# Patient Record
Sex: MALE | Race: OTHER | HISPANIC OR LATINO | URBAN - METROPOLITAN AREA
[De-identification: names, ages, dates, MRNs, and addresses within clinical notes are randomized per-mention and may not be internally consistent; named-entity substitution may affect disease eponyms.]

---

## 2017-08-10 ENCOUNTER — EMERGENCY (EMERGENCY)
Facility: HOSPITAL | Age: 67
LOS: 1 days | Discharge: HOME | End: 2017-08-10
Attending: EMERGENCY MEDICINE | Admitting: EMERGENCY MEDICINE
Payer: COMMERCIAL

## 2017-08-10 VITALS
OXYGEN SATURATION: 96 % | DIASTOLIC BLOOD PRESSURE: 78 MMHG | SYSTOLIC BLOOD PRESSURE: 155 MMHG | TEMPERATURE: 98 F | HEART RATE: 106 BPM | RESPIRATION RATE: 20 BRPM

## 2017-08-10 DIAGNOSIS — S82.91XA UNSPECIFIED FRACTURE OF RIGHT LOWER LEG, INITIAL ENCOUNTER FOR CLOSED FRACTURE: Chronic | ICD-10-CM

## 2017-08-10 PROCEDURE — 99283 EMERGENCY DEPT VISIT LOW MDM: CPT

## 2017-08-10 PROCEDURE — 51702 INSERT TEMP BLADDER CATH: CPT

## 2017-08-10 PROCEDURE — 81001 URINALYSIS AUTO W/SCOPE: CPT

## 2017-08-10 PROCEDURE — 87086 URINE CULTURE/COLONY COUNT: CPT

## 2017-08-10 PROCEDURE — 99283 EMERGENCY DEPT VISIT LOW MDM: CPT | Mod: 25

## 2017-08-10 NOTE — ED PROVIDER NOTE - NS ED ROS FT
ROS: denies HA, weakness, dizziness, fevers/chills, nausea/vomiting, chest pain, SOB, diaphoresis, abdominal pain, diarrhea, joint pain, neuro deficits, rash

## 2017-08-10 NOTE — ED PROVIDER NOTE - OBJECTIVE STATEMENT
67yo M with hx BPH s/p Prostiva RF 67yo M with hx BPH s/p Prostiva RF (8/2), pineda removal today (8/10) - presents with urinary retention and suprapubic pain 4 hours post pineda removal. Has been taking flomax, drank 1.5L of water after pineda removal. Pain with urination at meatus. No fevers, chills, other illnesses, no nausea, vomiting. Very anxious and pacing around the room

## 2017-08-10 NOTE — ED ADULT NURSE REASSESSMENT NOTE - NS ED NURSE REASSESS COMMENT FT1
Indwelling urinary catheter placed using aseptic technique. Sterile equipment utilized. Second RN present to confirm sterility. Draining to gravity - initial output of 300ml. Secured w/ stat lock to upper leg. Explained procedure as it was being done - Pt tolerated procedure well. Comfort and safety provided.

## 2017-08-10 NOTE — ED PROVIDER NOTE - MEDICAL DECISION MAKING DETAILS
see attending note    *pt speaks some english, daughter easily translating when needed, decline additional services

## 2017-08-10 NOTE — ED PROVIDER NOTE - CARE PLAN
Principal Discharge DX:	Urinary retention Principal Discharge DX:	Urinary retention  Instructions for follow-up, activity and diet:	- Follow up with your urologist tomorrow  - Take keflex 500mg two times per day for ten days  - Return to the ED if you have any fever, abdominal pain, urine not draining from pineda or any other concerning symptom. Principal Discharge DX:	Urinary retention  Instructions for follow-up, activity and diet:	- Follow up with your urologist tomorrow  - Take keflex 500mg two times per day for ten days  - Return to the ED if you have any fever, abdominal pain, urine not draining from pineda or any other concerning symptom.  Secondary Diagnosis:	UTI (urinary tract infection) Principal Discharge DX:	Urinary retention  Instructions for follow-up, activity and diet:	- Follow up with your urologist tomorrow  - Continue to take cipro as prescribed  - Return to the ED if you have any fever, abdominal pain, urine not draining from pineda or any other concerning symptom.  Secondary Diagnosis:	UTI (urinary tract infection)

## 2017-08-10 NOTE — ED PROVIDER NOTE - PHYSICAL EXAMINATION
Gen: Well appearing, NAD  Head: NCAT  HEENT: PERRL, MMM, normal conjunctiva, anicteric, neck supple  Lung: CTAB, no adventitious sounds  CV: RRR, no murmurs, rubs or gallops  Abd: soft, suprapubic tenderness/fullness, no rebound or guarding, no CVAT  MSK: No edema, no visible deformities  Neuro: CN II-XII grossly intact. 5/5 strength and normal sensation in all extremities.  Skin: Warm and dry, no evidence of rash  Psych: normal mood and affect

## 2017-08-10 NOTE — ED PROVIDER NOTE - ATTENDING CONTRIBUTION TO CARE
------------ATTENDING NOTE------------   65 yo M w/ daughter c/o unable to urinate since pineda catheter removed today, describing only very small amt urine, increasing moderate suprapubic fullness and dull constant pressure, complete relief after pineda placed in ED by RN (> 250 ml initially), no fevers, no additional complaints, awaiting UA and d/c Urology for close outpt f/u,.  - Gerard Vee MD   ----------------------------------------------------------------------- ------------ATTENDING NOTE------------   67 yo M w/ daughter c/o unable to urinate since pineda catheter removed today, describing only very small amt urine, increasing moderate suprapubic fullness and dull constant pressure, complete relief after pineda placed in ED by RN (> 250 ml initially), no fevers, no additional complaints, awaiting UA and d/c Urology for close outpt f/u, UA w/ +Nit (low WBC), no fevers, will tx as UTI, in depth d/w all about ddx, tx, casandra gallagher.  - Gerard Vee MD   -----------------------------------------------------------------------

## 2017-08-10 NOTE — ED PROVIDER NOTE - PLAN OF CARE
- Follow up with your urologist tomorrow  - Take keflex 500mg two times per day for ten days  - Return to the ED if you have any fever, abdominal pain, urine not draining from pineda or any other concerning symptom. - Follow up with your urologist tomorrow  - Continue to take cipro as prescribed  - Return to the ED if you have any fever, abdominal pain, urine not draining from pineda or any other concerning symptom.

## 2017-08-10 NOTE — ED PROVIDER NOTE - PROGRESS NOTE DETAILS
Please contact Dr. Enio Wolfe [phone]; emergency [phone] - need to speak to Urology at office. [facility] PGY-2: UA significant for UTI. Pt's urologist paged without answer. Plan to d/c home with pineda and abx for UTI. Ordered ceftriaxone to be given here.

## 2017-08-11 VITALS
DIASTOLIC BLOOD PRESSURE: 79 MMHG | OXYGEN SATURATION: 96 % | TEMPERATURE: 98 F | HEART RATE: 62 BPM | SYSTOLIC BLOOD PRESSURE: 124 MMHG | RESPIRATION RATE: 18 BRPM

## 2017-08-11 LAB
APPEARANCE UR: CLEAR — SIGNIFICANT CHANGE UP
BACTERIA # UR AUTO: ABNORMAL /HPF
BILIRUB UR-MCNC: ABNORMAL
COLOR SPEC: ABNORMAL
COMMENT - URINE: SIGNIFICANT CHANGE UP
DIFF PNL FLD: ABNORMAL
GLUCOSE UR QL: NEGATIVE — SIGNIFICANT CHANGE UP
KETONES UR-MCNC: NEGATIVE — SIGNIFICANT CHANGE UP
LEUKOCYTE ESTERASE UR-ACNC: NEGATIVE — SIGNIFICANT CHANGE UP
NITRITE UR-MCNC: POSITIVE
PH UR: 6 — SIGNIFICANT CHANGE UP (ref 5–8)
PROT UR-MCNC: 100 MG/DL
RBC CASTS # UR COMP ASSIST: >50 /HPF (ref 0–2)
SP GR SPEC: 1.01 — SIGNIFICANT CHANGE UP (ref 1.01–1.02)
UROBILINOGEN FLD QL: 1
WBC UR QL: SIGNIFICANT CHANGE UP /HPF (ref 0–5)

## 2017-08-11 RX ORDER — CEPHALEXIN 500 MG
500 CAPSULE ORAL ONCE
Qty: 0 | Refills: 0 | Status: DISCONTINUED | OUTPATIENT
Start: 2017-08-11 | End: 2017-08-14

## 2017-08-11 RX ORDER — CEFTRIAXONE 500 MG/1
1 INJECTION, POWDER, FOR SOLUTION INTRAMUSCULAR; INTRAVENOUS ONCE
Qty: 0 | Refills: 0 | Status: DISCONTINUED | OUTPATIENT
Start: 2017-08-11 | End: 2017-08-11

## 2017-08-11 RX ORDER — CEPHALEXIN 500 MG
1 CAPSULE ORAL
Qty: 20 | Refills: 0 | OUTPATIENT
Start: 2017-08-11 | End: 2017-08-21

## 2017-08-11 NOTE — ED ADULT NURSE REASSESSMENT NOTE - NS ED NURSE REASSESS COMMENT FT1
Patient on PO Cipro at home for UTI, Dr. Walker aware and states OK to hold Keflex and continue with Cipro. Pt transferred to a leg bag, education provided. Pt states he feels comfortable with leg bag as he recently had one.

## 2017-08-12 LAB
CULTURE RESULTS: NO GROWTH — SIGNIFICANT CHANGE UP
SPECIMEN SOURCE: SIGNIFICANT CHANGE UP

## 2017-08-18 ENCOUNTER — EMERGENCY (EMERGENCY)
Facility: HOSPITAL | Age: 67
LOS: 1 days | Discharge: HOME | End: 2017-08-18
Attending: EMERGENCY MEDICINE | Admitting: EMERGENCY MEDICINE
Payer: COMMERCIAL

## 2017-08-18 VITALS
SYSTOLIC BLOOD PRESSURE: 121 MMHG | TEMPERATURE: 98 F | HEART RATE: 67 BPM | DIASTOLIC BLOOD PRESSURE: 78 MMHG | OXYGEN SATURATION: 98 % | RESPIRATION RATE: 19 BRPM

## 2017-08-18 VITALS
HEART RATE: 85 BPM | SYSTOLIC BLOOD PRESSURE: 117 MMHG | OXYGEN SATURATION: 95 % | RESPIRATION RATE: 16 BRPM | DIASTOLIC BLOOD PRESSURE: 76 MMHG | TEMPERATURE: 98 F

## 2017-08-18 DIAGNOSIS — S82.91XA UNSPECIFIED FRACTURE OF RIGHT LOWER LEG, INITIAL ENCOUNTER FOR CLOSED FRACTURE: Chronic | ICD-10-CM

## 2017-08-18 LAB
ANION GAP SERPL CALC-SCNC: 14 MMOL/L — SIGNIFICANT CHANGE UP (ref 5–17)
APPEARANCE UR: CLEAR — SIGNIFICANT CHANGE UP
BILIRUB UR-MCNC: NEGATIVE — SIGNIFICANT CHANGE UP
BUN SERPL-MCNC: 16 MG/DL — SIGNIFICANT CHANGE UP (ref 7–23)
CALCIUM SERPL-MCNC: 8.9 MG/DL — SIGNIFICANT CHANGE UP (ref 8.4–10.5)
CHLORIDE SERPL-SCNC: 102 MMOL/L — SIGNIFICANT CHANGE UP (ref 96–108)
CO2 SERPL-SCNC: 22 MMOL/L — SIGNIFICANT CHANGE UP (ref 22–31)
COLOR SPEC: YELLOW — SIGNIFICANT CHANGE UP
CREAT SERPL-MCNC: 0.96 MG/DL — SIGNIFICANT CHANGE UP (ref 0.5–1.3)
DIFF PNL FLD: ABNORMAL
GLUCOSE SERPL-MCNC: 117 MG/DL — HIGH (ref 70–99)
GLUCOSE UR QL: NEGATIVE — SIGNIFICANT CHANGE UP
KETONES UR-MCNC: NEGATIVE — SIGNIFICANT CHANGE UP
LEUKOCYTE ESTERASE UR-ACNC: NEGATIVE — SIGNIFICANT CHANGE UP
NITRITE UR-MCNC: NEGATIVE — SIGNIFICANT CHANGE UP
PH UR: 6 — SIGNIFICANT CHANGE UP (ref 5–8)
POTASSIUM SERPL-MCNC: 4 MMOL/L — SIGNIFICANT CHANGE UP (ref 3.5–5.3)
POTASSIUM SERPL-SCNC: 4 MMOL/L — SIGNIFICANT CHANGE UP (ref 3.5–5.3)
PROT UR-MCNC: 30 MG/DL
RBC CASTS # UR COMP ASSIST: >50 /HPF (ref 0–2)
SODIUM SERPL-SCNC: 138 MMOL/L — SIGNIFICANT CHANGE UP (ref 135–145)
SP GR SPEC: 1.01 — SIGNIFICANT CHANGE UP (ref 1.01–1.02)
UROBILINOGEN FLD QL: NEGATIVE — SIGNIFICANT CHANGE UP
WBC UR QL: SIGNIFICANT CHANGE UP /HPF (ref 0–5)

## 2017-08-18 PROCEDURE — 51702 INSERT TEMP BLADDER CATH: CPT

## 2017-08-18 PROCEDURE — 80048 BASIC METABOLIC PNL TOTAL CA: CPT

## 2017-08-18 PROCEDURE — 99283 EMERGENCY DEPT VISIT LOW MDM: CPT | Mod: 25

## 2017-08-18 PROCEDURE — 87086 URINE CULTURE/COLONY COUNT: CPT

## 2017-08-18 PROCEDURE — 81001 URINALYSIS AUTO W/SCOPE: CPT

## 2017-08-18 NOTE — ED ADULT NURSE NOTE - OBJECTIVE STATEMENT
Pt is a 67 yo male with the co urinary retention since midnight. Pt reports having prostate surgery on August 2, was d/beinto with a pineda and had the pineda removed on August 10th. Pt returned to ED on August 10th and had the pineda replaced and was removed again yesterday at 1pm. Pt was able to urinate all of yesterday up till midnight. Pt reports penile pain and the feeling of urinary urgency, but is unable to urinate. Pt denies any CP or SOB no N/V/D no cough fever or chills. Pt is currently taking flomax and finished his cipro on wednesday.

## 2017-08-18 NOTE — ED PROVIDER NOTE - CARE PLAN
Principal Discharge DX:	Urinary retention  Instructions for follow-up, activity and diet:	You were seen in the Emergency Department for urinary retention. Your examination and lab tests were reassuring. A catheter was placed. Keep the catheter in place and Please follow up with your urologist as soon as possible for further evaluation. Please return to the Emergency Department if you have any new concerning symptoms such as severe pain, weakness, shortness of breath or any other concerning symptoms.

## 2017-08-18 NOTE — ED PROVIDER NOTE - OBJECTIVE STATEMENT
65yo male p/w urinary retention for 5 hours. Pt. had prostate surgery in August 2 with Enio Diaz([phone]). Pt. had pineda removed 8/10. Pt. presented to ED on 8/10 and had to have pineda replaced. Pineda was removed yesterday after 1 week. Now patient reports penile pain and urgency, but is unable to urinate. Denies fevers, chest pain, shortness of breath, cough, n/v, weakness. Patient finished course of cipro on wed.

## 2017-08-18 NOTE — ED PROVIDER NOTE - PLAN OF CARE
You were seen in the Emergency Department for urinary retention. Your examination and lab tests were reassuring. A catheter was placed. Keep the catheter in place and Please follow up with your urologist as soon as possible for further evaluation. Please return to the Emergency Department if you have any new concerning symptoms such as severe pain, weakness, shortness of breath or any other concerning symptoms.

## 2017-08-18 NOTE — ED ADULT NURSE REASSESSMENT NOTE - NS ED NURSE REASSESS COMMENT FT1
Pt AAOx4, NAD, resting comfortably in bed with spouse at bedside. Pt denies headache, dizziness, chest pain, cough, SOB, abdominal pain, n/v/d, urinary symptoms, fevers, chills, weakness at this time. Pt discharged as per MD, miriam changed to a leg bag and is draining urine, pt ambulated independently out of ED.

## 2017-08-18 NOTE — ED PROVIDER NOTE - ATTENDING CONTRIBUTION TO CARE
Patient with history of urinary retention presenting with no urine output for 5 hours and dyscomfort.  Already on antibiotics for presumptive infection.  Has outpatient urologist.  Has required foleys in past for same.  On exam patient well appearing, vital signs within normal limits, mild suprapubic tenderness to palpation.  Will check renal function, place pineda, repeat UA/UC, dc with urologist follow up.

## 2017-08-18 NOTE — ED ADULT NURSE REASSESSMENT NOTE - NS ED NURSE REASSESS COMMENT FT1
Mckeon catheter inserted using sterile technique. Second RN present to confirm sterility. Bedside drainage to gravity. Stat lock in place. 300 cc drained

## 2017-08-19 LAB
CULTURE RESULTS: NO GROWTH — SIGNIFICANT CHANGE UP
SPECIMEN SOURCE: SIGNIFICANT CHANGE UP

## 2017-08-25 NOTE — ED PROCEDURE NOTE - PROCEDURE ADDITIONAL DETAILS
Emergency Department Focused Ultrasound performed at patient's bedside for educational purposes. The study will have a follow up study performed or was performed in the direct supervision of an ultrasound trained attending.  Bladder scan.

## 2018-01-06 ENCOUNTER — EMERGENCY (EMERGENCY)
Facility: HOSPITAL | Age: 68
LOS: 1 days | Discharge: HOME | End: 2018-01-06
Attending: EMERGENCY MEDICINE | Admitting: EMERGENCY MEDICINE
Payer: COMMERCIAL

## 2018-01-06 VITALS
DIASTOLIC BLOOD PRESSURE: 70 MMHG | RESPIRATION RATE: 16 BRPM | WEIGHT: 167.99 LBS | OXYGEN SATURATION: 96 % | TEMPERATURE: 98 F | HEART RATE: 72 BPM | SYSTOLIC BLOOD PRESSURE: 122 MMHG | HEIGHT: 65 IN

## 2018-01-06 VITALS
SYSTOLIC BLOOD PRESSURE: 108 MMHG | DIASTOLIC BLOOD PRESSURE: 64 MMHG | OXYGEN SATURATION: 95 % | TEMPERATURE: 98 F | RESPIRATION RATE: 14 BRPM | HEART RATE: 66 BPM

## 2018-01-06 DIAGNOSIS — S82.91XA UNSPECIFIED FRACTURE OF RIGHT LOWER LEG, INITIAL ENCOUNTER FOR CLOSED FRACTURE: Chronic | ICD-10-CM

## 2018-01-06 PROCEDURE — 99283 EMERGENCY DEPT VISIT LOW MDM: CPT

## 2018-01-06 RX ORDER — AZITHROMYCIN 500 MG/1
500 TABLET, FILM COATED ORAL ONCE
Qty: 0 | Refills: 0 | Status: DISCONTINUED | OUTPATIENT
Start: 2018-01-06 | End: 2018-01-10

## 2018-01-06 RX ORDER — ACETAMINOPHEN 500 MG
650 TABLET ORAL ONCE
Qty: 0 | Refills: 0 | Status: DISCONTINUED | OUTPATIENT
Start: 2018-01-06 | End: 2018-01-10

## 2018-01-06 RX ORDER — ACETAMINOPHEN 500 MG
650 TABLET ORAL ONCE
Qty: 0 | Refills: 0 | Status: COMPLETED | OUTPATIENT
Start: 2018-01-06 | End: 2018-01-06

## 2018-01-06 RX ORDER — AZITHROMYCIN 500 MG/1
500 TABLET, FILM COATED ORAL ONCE
Qty: 0 | Refills: 0 | Status: COMPLETED | OUTPATIENT
Start: 2018-01-06 | End: 2018-01-06

## 2018-01-06 RX ADMIN — Medication 650 MILLIGRAM(S): at 09:18

## 2018-01-06 RX ADMIN — AZITHROMYCIN 500 MILLIGRAM(S): 500 TABLET, FILM COATED ORAL at 09:18
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
## 2018-01-06 NOTE — ED ADULT NURSE NOTE - OBJECTIVE STATEMENT
pt c/o fever weakness general malaise feeling tired cough and fever and sore throat and nasal congestion

## 2018-01-06 NOTE — ED PROVIDER NOTE - ENMT, MLM
Airway patent, Nasal mucosa clear. Mouth with normal mucosa. Throat has no vesicles sl red, no oropharyngeal exudates and uvula is midline.

## 2018-01-06 NOTE — ED ADULT NURSE REASSESSMENT NOTE - NS ED NURSE REASSESS COMMENT FT1
pt re evaluated by md and to be d'c/d  pt discharged stable and ambulatory in nad at present d/c instruction reinforced and pt verbalized understanding vital signs as charted

## 2018-01-07 ENCOUNTER — TRANSCRIPTION ENCOUNTER (OUTPATIENT)
Age: 68
End: 2018-01-07

## 2018-02-05 ENCOUNTER — TRANSCRIPTION ENCOUNTER (OUTPATIENT)
Age: 68
End: 2018-02-05

## 2018-05-07 ENCOUNTER — TRANSCRIPTION ENCOUNTER (OUTPATIENT)
Age: 68
End: 2018-05-07

## 2018-05-07 ENCOUNTER — EMERGENCY (EMERGENCY)
Facility: HOSPITAL | Age: 68
LOS: 1 days | Discharge: HOME | End: 2018-05-07
Attending: EMERGENCY MEDICINE
Payer: COMMERCIAL

## 2018-05-07 VITALS
SYSTOLIC BLOOD PRESSURE: 114 MMHG | TEMPERATURE: 98 F | OXYGEN SATURATION: 99 % | RESPIRATION RATE: 18 BRPM | HEART RATE: 82 BPM | DIASTOLIC BLOOD PRESSURE: 77 MMHG

## 2018-05-07 DIAGNOSIS — S82.91XA UNSPECIFIED FRACTURE OF RIGHT LOWER LEG, INITIAL ENCOUNTER FOR CLOSED FRACTURE: Chronic | ICD-10-CM

## 2018-05-07 PROCEDURE — 93005 ELECTROCARDIOGRAM TRACING: CPT

## 2018-05-07 PROCEDURE — 99283 EMERGENCY DEPT VISIT LOW MDM: CPT

## 2018-05-07 PROCEDURE — 94640 AIRWAY INHALATION TREATMENT: CPT

## 2018-05-07 PROCEDURE — 71046 X-RAY EXAM CHEST 2 VIEWS: CPT

## 2018-05-07 PROCEDURE — 93010 ELECTROCARDIOGRAM REPORT: CPT | Mod: NC

## 2018-05-07 PROCEDURE — 99284 EMERGENCY DEPT VISIT MOD MDM: CPT | Mod: 25

## 2018-05-07 PROCEDURE — 71046 X-RAY EXAM CHEST 2 VIEWS: CPT | Mod: 26

## 2018-05-07 RX ORDER — ALBUTEROL 90 UG/1
1 AEROSOL, METERED ORAL ONCE
Qty: 0 | Refills: 0 | Status: COMPLETED | OUTPATIENT
Start: 2018-05-07 | End: 2018-05-07

## 2018-05-07 RX ORDER — ACETAMINOPHEN 500 MG
650 TABLET ORAL ONCE
Qty: 0 | Refills: 0 | Status: COMPLETED | OUTPATIENT
Start: 2018-05-07 | End: 2018-05-07

## 2018-05-07 RX ADMIN — ALBUTEROL 1 PUFF(S): 90 AEROSOL, METERED ORAL at 20:21

## 2018-05-07 RX ADMIN — Medication 650 MILLIGRAM(S): at 20:18

## 2018-05-07 RX ADMIN — Medication 100 MILLIGRAM(S): at 20:18

## 2018-05-07 NOTE — ED PROVIDER NOTE - PROGRESS NOTE DETAILS
Ed Marks MD (resident): patient improved w/ albuterol and meds. albuterol inhaler was given to the patient

## 2018-05-07 NOTE — ED PROVIDER NOTE - MEDICAL DECISION MAKING DETAILS
Ed Marks MD (resident): symptoms concerning for PNA vs viral respiratory/pharyngeal infection. Centor criteria of 0, no further work-up for strep. No sign of deep space neck abscess. no anginal symptoms, no BILL. SOB not likely due to cardiovascular cause based on examination. : symptoms concerning for PNA vs viral respiratory/pharyngeal infection. Centor criteria of 0, no further work-up for strep. No sign of deep space neck abscess. no anginal symptoms, no BILL ecg is normal . SOB not likely due to cardiovascular cause based on examination.ZR :  67 y old with couth ,sore throat and low great fever  ,probably common cold symptoms concerning for PNA vs viral respiratory/pharyngeal infection. Centor criteria of 0, no further work-up for strep. No sign of deep space neck abscess. no anginal symptoms, no BILL ecg is normal . SOB not likely due to cardiovascular cause based on examination. ZR

## 2018-05-07 NOTE — ED PROVIDER NOTE - PLAN OF CARE
1) Please follow-up with your Primary Medical Doctor in 3-5 days. If you need to find a new physician, please call [phone].  2) Return to the Emergency Department if you experiences: fevers, chills, chest pain, shortness of breath, or symptoms that are new or recurrent.  3) If you have any questions or concerns, do not hesitate to contact us at [phone].

## 2018-05-07 NOTE — ED PROVIDER NOTE - OBJECTIVE STATEMENT
Ed Marks MD (resident): 67 M w/ Hx of HTN, CAD s/p PCI x3 last 2 yrs ago, currently on ASA 81 mg, who p/w sore throat, cough w/ white sputum, H/A gradual onset that is mild in severity, and symptoms that started last night. Sore throat without any change in voice, drooling, neck stiffness. Pt sent from Urgent care because of SOB, however when talking to the patient, he denies having any SOB aside from when he coughs, which is infrequent. No CP, no BILL, no orthopnea, no leg swelling, no palpitations, no lightheadedness, no syncope.

## 2018-05-07 NOTE — ED PROVIDER NOTE - CARE PLAN
Principal Discharge DX:	Cough  Assessment and plan of treatment:	1) Please follow-up with your Primary Medical Doctor in 3-5 days. If you need to find a new physician, please call [phone].  2) Return to the Emergency Department if you experiences: fevers, chills, chest pain, shortness of breath, or symptoms that are new or recurrent.  3) If you have any questions or concerns, do not hesitate to contact us at [phone].  Secondary Diagnosis:	Upper respiratory infection

## 2018-05-07 NOTE — ED PROVIDER NOTE - PHYSICAL EXAMINATION
Physical Exam: elderly M who is in no respiratory distress, no increased WOB, no tachypnea, AAOx3, NCAT, MMM, neck is supple, no JVD, PERRL, CTAB, no rales or ronchi, normal rate and regular rhythm, abdomen is soft and NTND, No LE edema, No deformity of extremities, No rashes, CN grossly intact, No focal motor or sensory deficits. ~ Ed Marks MD

## 2018-05-07 NOTE — ED PROVIDER NOTE - NS ED ROS FT
No fever, no chills, no change in vision, no change in hearing, + sore throat, no chest pain, + cough, no dyspnea on exertion, no abdominal pain, no vomiting, no dysuria, no muscle pain, no rashes, no loss of consciousness. ~ Ed Marks MD

## 2018-05-07 NOTE — ED ADULT NURSE NOTE - OBJECTIVE STATEMENT
67 y.o male w. PMH of HTN, CAD came to the ER w. c/o sore throat, coughing w. white sputum, pt states it is sometimes difficult to breath with his throat. Pt denies any CP, n/v, fever, chills, weakness. Pt is axox4, lungs CTA, +bs abdomen soft non-distended, ambulating w. steady gait, safety and comfort maintained, no acute distress noted at this time.

## 2018-06-08 ENCOUNTER — NON-APPOINTMENT (OUTPATIENT)
Age: 68
End: 2018-06-08

## 2018-06-08 ENCOUNTER — APPOINTMENT (OUTPATIENT)
Dept: CARDIOLOGY | Facility: CLINIC | Age: 68
End: 2018-06-08
Payer: COMMERCIAL

## 2018-06-08 VITALS — HEART RATE: 73 BPM | OXYGEN SATURATION: 93 % | SYSTOLIC BLOOD PRESSURE: 124 MMHG | DIASTOLIC BLOOD PRESSURE: 74 MMHG

## 2018-06-08 PROCEDURE — 93000 ELECTROCARDIOGRAM COMPLETE: CPT

## 2018-06-08 PROCEDURE — 99214 OFFICE O/P EST MOD 30 MIN: CPT

## 2018-06-14 ENCOUNTER — INPATIENT (INPATIENT)
Facility: HOSPITAL | Age: 68
LOS: 0 days | Discharge: HOME | DRG: 247 | End: 2018-06-15
Attending: INTERNAL MEDICINE | Admitting: INTERNAL MEDICINE
Payer: COMMERCIAL

## 2018-06-14 ENCOUNTER — TRANSCRIPTION ENCOUNTER (OUTPATIENT)
Age: 68
End: 2018-06-14

## 2018-06-14 VITALS — HEIGHT: 65 IN | WEIGHT: 167.99 LBS

## 2018-06-14 DIAGNOSIS — I25.10 ATHEROSCLEROTIC HEART DISEASE OF NATIVE CORONARY ARTERY WITHOUT ANGINA PECTORIS: ICD-10-CM

## 2018-06-14 DIAGNOSIS — S82.91XA UNSPECIFIED FRACTURE OF RIGHT LOWER LEG, INITIAL ENCOUNTER FOR CLOSED FRACTURE: Chronic | ICD-10-CM

## 2018-06-14 LAB
ANION GAP SERPL CALC-SCNC: 15 MMOL/L — SIGNIFICANT CHANGE UP (ref 5–17)
BUN SERPL-MCNC: 19 MG/DL — SIGNIFICANT CHANGE UP (ref 7–23)
CALCIUM SERPL-MCNC: 8.6 MG/DL — SIGNIFICANT CHANGE UP (ref 8.4–10.5)
CHLORIDE SERPL-SCNC: 103 MMOL/L — SIGNIFICANT CHANGE UP (ref 96–108)
CO2 SERPL-SCNC: 22 MMOL/L — SIGNIFICANT CHANGE UP (ref 22–31)
CREAT SERPL-MCNC: 0.97 MG/DL — SIGNIFICANT CHANGE UP (ref 0.5–1.3)
GLUCOSE SERPL-MCNC: 93 MG/DL — SIGNIFICANT CHANGE UP (ref 70–99)
HCT VFR BLD CALC: 46.1 % — SIGNIFICANT CHANGE UP (ref 39–50)
HGB BLD-MCNC: 15.6 G/DL — SIGNIFICANT CHANGE UP (ref 13–17)
MCHC RBC-ENTMCNC: 30.2 PG — SIGNIFICANT CHANGE UP (ref 27–34)
MCHC RBC-ENTMCNC: 33.8 GM/DL — SIGNIFICANT CHANGE UP (ref 32–36)
MCV RBC AUTO: 89.3 FL — SIGNIFICANT CHANGE UP (ref 80–100)
PLATELET # BLD AUTO: 152 K/UL — SIGNIFICANT CHANGE UP (ref 150–400)
POTASSIUM SERPL-MCNC: 3.8 MMOL/L — SIGNIFICANT CHANGE UP (ref 3.5–5.3)
POTASSIUM SERPL-SCNC: 3.8 MMOL/L — SIGNIFICANT CHANGE UP (ref 3.5–5.3)
RBC # BLD: 5.16 M/UL — SIGNIFICANT CHANGE UP (ref 4.2–5.8)
RBC # FLD: 11.6 % — SIGNIFICANT CHANGE UP (ref 10.3–14.5)
SODIUM SERPL-SCNC: 140 MMOL/L — SIGNIFICANT CHANGE UP (ref 135–145)
WBC # BLD: 6.5 K/UL — SIGNIFICANT CHANGE UP (ref 3.8–10.5)
WBC # FLD AUTO: 6.5 K/UL — SIGNIFICANT CHANGE UP (ref 3.8–10.5)

## 2018-06-14 PROCEDURE — 92928 PRQ TCAT PLMT NTRAC ST 1 LES: CPT | Mod: LC

## 2018-06-14 PROCEDURE — 99152 MOD SED SAME PHYS/QHP 5/>YRS: CPT

## 2018-06-14 PROCEDURE — 92920 PRQ TRLUML C ANGIOP 1ART&/BR: CPT | Mod: LD

## 2018-06-14 PROCEDURE — 93458 L HRT ARTERY/VENTRICLE ANGIO: CPT | Mod: 26,59

## 2018-06-14 PROCEDURE — 93010 ELECTROCARDIOGRAM REPORT: CPT

## 2018-06-14 RX ORDER — ACETAMINOPHEN 500 MG
650 TABLET ORAL EVERY 6 HOURS
Qty: 0 | Refills: 0 | Status: DISCONTINUED | OUTPATIENT
Start: 2018-06-14 | End: 2018-06-15

## 2018-06-14 RX ORDER — ATORVASTATIN CALCIUM 80 MG/1
40 TABLET, FILM COATED ORAL AT BEDTIME
Qty: 0 | Refills: 0 | Status: DISCONTINUED | OUTPATIENT
Start: 2018-06-14 | End: 2018-06-15

## 2018-06-14 RX ORDER — METOPROLOL TARTRATE 50 MG
25 TABLET ORAL
Qty: 0 | Refills: 0 | Status: DISCONTINUED | OUTPATIENT
Start: 2018-06-14 | End: 2018-06-15

## 2018-06-14 RX ORDER — CLOPIDOGREL BISULFATE 75 MG/1
75 TABLET, FILM COATED ORAL DAILY
Qty: 0 | Refills: 0 | Status: DISCONTINUED | OUTPATIENT
Start: 2018-06-14 | End: 2018-06-15

## 2018-06-14 RX ORDER — CLOPIDOGREL BISULFATE 75 MG/1
1 TABLET, FILM COATED ORAL
Qty: 90 | Refills: 4 | OUTPATIENT
Start: 2018-06-14 | End: 2019-09-06

## 2018-06-14 RX ORDER — ASPIRIN/CALCIUM CARB/MAGNESIUM 324 MG
81 TABLET ORAL DAILY
Qty: 0 | Refills: 0 | Status: DISCONTINUED | OUTPATIENT
Start: 2018-06-14 | End: 2018-06-15

## 2018-06-14 RX ORDER — PSEUDOEPH/DM/GUAIFEN/ACETAMIN 30-10-250
0 CAPSULE ORAL
Qty: 0 | Refills: 0 | COMMUNITY

## 2018-06-14 RX ORDER — ATORVASTATIN CALCIUM 80 MG/1
1 TABLET, FILM COATED ORAL
Qty: 30 | Refills: 0 | OUTPATIENT
Start: 2018-06-14 | End: 2018-07-13

## 2018-06-14 RX ADMIN — ATORVASTATIN CALCIUM 40 MILLIGRAM(S): 80 TABLET, FILM COATED ORAL at 21:50

## 2018-06-14 RX ADMIN — Medication 25 MILLIGRAM(S): at 18:23

## 2018-06-14 RX ADMIN — Medication 650 MILLIGRAM(S): at 17:00

## 2018-06-14 RX ADMIN — Medication 650 MILLIGRAM(S): at 18:00

## 2018-06-14 NOTE — DISCHARGE NOTE ADULT - HOSPITAL COURSE
This is a 66 yo man history of CAD/MI / PCI LAD/ OM1 2015  , Eczema, BPH presents as outpatient for cardiac cath.  Pt has been experiencing chest burning with exertion and shortness of breath.   Pt had abnormal stress test on 5/29/18 revealing small area of inferior-lateral ischemia with EF 73%    S/P PCI of mid D1 (70%)-POBA and pCx (90%) x 1 YARIEL. Pt tolerated procedure well with no post cath complications and hospitalization remained uneventful. Pt stable for discharge home.     Dr. Freddy Marcial- CArdio (14 Jun 2018 12:08)

## 2018-06-14 NOTE — H&P CARDIOLOGY - HISTORY OF PRESENT ILLNESS
This is a 68 yo man history of CAD/MI / PCI LAD/ OM1 2015  , Eczema, BPH presents as outpatient for cardiac cath.  Pt has been experiencing chest burning with exertion and shortness of breath.   Pt had abnormal stress test         Dr. Freddy Marcial- CArdio This is a 68 yo man history of CAD/MI / PCI LAD/ OM1 2015  , Eczema, BPH presents as outpatient for cardiac cath.  Pt has been experiencing chest burning with exertion and shortness of breath.   Pt had abnormal stress test on 5/29/18 revealing small area of inferior-lateral ischemia with EF 73%        Dr. Freddy Marcial- CArdio

## 2018-06-14 NOTE — DISCHARGE NOTE ADULT - CARE PROVIDERS DIRECT ADDRESSES
,DirectAddress_Unknown ,DirectAddress_Unknown,[email or web address]

## 2018-06-14 NOTE — DISCHARGE NOTE ADULT - MEDICATION SUMMARY - MEDICATIONS TO TAKE
I will START or STAY ON the medications listed below when I get home from the hospital:    aspirin 81 mg oral tablet, chewable  -- 1 tab(s) by mouth once a day  -- Indication: For Heart Stent    atorvastatin 40 mg oral tablet  -- 1 tab(s) by mouth once a day (at bedtime) MDD:1  -- Indication: For Cholesterol    clopidogrel 75 mg oral tablet  -- 1 tab(s) by mouth once a day MDD:1  -- Indication: For Heart Stent    Metoprolol Tartrate 25 mg oral tablet  -- 1 tab(s) by mouth 2 times a day  -- Indication: For Heart

## 2018-06-14 NOTE — CHART NOTE - NSCHARTNOTEFT_GEN_A_CORE
Patient underwent a PCI procedure and is being admitted as they are at increased risk for major adverse cardiac and vascular events if discharged due to the following high risk characteristics:      Pre- Procedural Clinical Criteria  Unstable angina     Admit- patient underwent a PCI procedure and is being admitted due to high risk characteristicts and is considered to be at an increased risk of major adverse cardiovascular events if discharged at this time   -mDiag1 lesion x 1 YARIEL  -POBA of pCx (90%)

## 2018-06-14 NOTE — DISCHARGE NOTE ADULT - PATIENT PORTAL LINK FT
You can access the [facility] Patient Portal, offered by [facility], by registering with the following website: [email or web address]

## 2018-06-14 NOTE — DISCHARGE NOTE ADULT - CARE PROVIDER_API CALL
Freddy Marcial), Cardiovascular Disease  [address] [place] [address]  Phone: [phone]  Fax: [phone] Freddy Marcial), Cardiovascular Disease  [address] [place] [address]  Phone: [phone]  Fax: [phone]    Nayan Rosas (MD), Cardiovascular Disease; Interventional Cardiology  [address] [place] [id number]  Phone: [phone]  Fax: [phone]

## 2018-06-14 NOTE — DISCHARGE NOTE ADULT - CARE PLAN
Principal Discharge DX:	Coronary artery disease with other form of angina pectoris  Goal:	You will be free of chest pain or shortness of breath.  Assessment and plan of treatment:	Low salt, low fat diet.   Weight management.   Take medications as prescribed.    No smoking.  Follow up appointments with your doctor(s)  as instruced.

## 2018-06-15 VITALS
TEMPERATURE: 98 F | DIASTOLIC BLOOD PRESSURE: 75 MMHG | SYSTOLIC BLOOD PRESSURE: 121 MMHG | RESPIRATION RATE: 16 BRPM | OXYGEN SATURATION: 98 % | HEART RATE: 71 BPM

## 2018-06-15 DIAGNOSIS — I10 ESSENTIAL (PRIMARY) HYPERTENSION: ICD-10-CM

## 2018-06-15 DIAGNOSIS — E78.5 HYPERLIPIDEMIA, UNSPECIFIED: ICD-10-CM

## 2018-06-15 DIAGNOSIS — I25.118 ATHEROSCLEROTIC HEART DISEASE OF NATIVE CORONARY ARTERY WITH OTHER FORMS OF ANGINA PECTORIS: ICD-10-CM

## 2018-06-15 LAB
ANION GAP SERPL CALC-SCNC: 13 MMOL/L — SIGNIFICANT CHANGE UP (ref 5–17)
BASOPHILS # BLD AUTO: 0 K/UL — SIGNIFICANT CHANGE UP (ref 0–0.2)
BASOPHILS NFR BLD AUTO: 0.5 % — SIGNIFICANT CHANGE UP (ref 0–2)
BUN SERPL-MCNC: 21 MG/DL — SIGNIFICANT CHANGE UP (ref 7–23)
CALCIUM SERPL-MCNC: 8.6 MG/DL — SIGNIFICANT CHANGE UP (ref 8.4–10.5)
CHLORIDE SERPL-SCNC: 104 MMOL/L — SIGNIFICANT CHANGE UP (ref 96–108)
CO2 SERPL-SCNC: 22 MMOL/L — SIGNIFICANT CHANGE UP (ref 22–31)
CREAT SERPL-MCNC: 1.13 MG/DL — SIGNIFICANT CHANGE UP (ref 0.5–1.3)
EOSINOPHIL # BLD AUTO: 0.3 K/UL — SIGNIFICANT CHANGE UP (ref 0–0.5)
EOSINOPHIL NFR BLD AUTO: 4 % — SIGNIFICANT CHANGE UP (ref 0–6)
GLUCOSE SERPL-MCNC: 122 MG/DL — HIGH (ref 70–99)
HCT VFR BLD CALC: 44.4 % — SIGNIFICANT CHANGE UP (ref 39–50)
HGB BLD-MCNC: 15.4 G/DL — SIGNIFICANT CHANGE UP (ref 13–17)
LYMPHOCYTES # BLD AUTO: 2.1 K/UL — SIGNIFICANT CHANGE UP (ref 1–3.3)
LYMPHOCYTES # BLD AUTO: 27.6 % — SIGNIFICANT CHANGE UP (ref 13–44)
MCHC RBC-ENTMCNC: 30.9 PG — SIGNIFICANT CHANGE UP (ref 27–34)
MCHC RBC-ENTMCNC: 34.6 GM/DL — SIGNIFICANT CHANGE UP (ref 32–36)
MCV RBC AUTO: 89.4 FL — SIGNIFICANT CHANGE UP (ref 80–100)
MONOCYTES # BLD AUTO: 0.7 K/UL — SIGNIFICANT CHANGE UP (ref 0–0.9)
MONOCYTES NFR BLD AUTO: 9.2 % — SIGNIFICANT CHANGE UP (ref 2–14)
NEUTROPHILS # BLD AUTO: 4.5 K/UL — SIGNIFICANT CHANGE UP (ref 1.8–7.4)
NEUTROPHILS NFR BLD AUTO: 58.7 % — SIGNIFICANT CHANGE UP (ref 43–77)
PLATELET # BLD AUTO: 141 K/UL — LOW (ref 150–400)
POTASSIUM SERPL-MCNC: 3.7 MMOL/L — SIGNIFICANT CHANGE UP (ref 3.5–5.3)
POTASSIUM SERPL-SCNC: 3.7 MMOL/L — SIGNIFICANT CHANGE UP (ref 3.5–5.3)
RBC # BLD: 4.97 M/UL — SIGNIFICANT CHANGE UP (ref 4.2–5.8)
RBC # FLD: 11.6 % — SIGNIFICANT CHANGE UP (ref 10.3–14.5)
SODIUM SERPL-SCNC: 139 MMOL/L — SIGNIFICANT CHANGE UP (ref 135–145)
WBC # BLD: 7.7 K/UL — SIGNIFICANT CHANGE UP (ref 3.8–10.5)
WBC # FLD AUTO: 7.7 K/UL — SIGNIFICANT CHANGE UP (ref 3.8–10.5)

## 2018-06-15 PROCEDURE — C1887: CPT

## 2018-06-15 PROCEDURE — C1725: CPT

## 2018-06-15 PROCEDURE — 99153 MOD SED SAME PHYS/QHP EA: CPT

## 2018-06-15 PROCEDURE — C1874: CPT

## 2018-06-15 PROCEDURE — 85027 COMPLETE CBC AUTOMATED: CPT

## 2018-06-15 PROCEDURE — 93458 L HRT ARTERY/VENTRICLE ANGIO: CPT | Mod: 59

## 2018-06-15 PROCEDURE — C1894: CPT

## 2018-06-15 PROCEDURE — C1769: CPT

## 2018-06-15 PROCEDURE — 93005 ELECTROCARDIOGRAM TRACING: CPT

## 2018-06-15 PROCEDURE — 92920 PRQ TRLUML C ANGIOP 1ART&/BR: CPT | Mod: LD

## 2018-06-15 PROCEDURE — 99152 MOD SED SAME PHYS/QHP 5/>YRS: CPT

## 2018-06-15 PROCEDURE — 80048 BASIC METABOLIC PNL TOTAL CA: CPT

## 2018-06-15 PROCEDURE — C9600: CPT | Mod: LC

## 2018-06-15 RX ORDER — ATORVASTATIN CALCIUM 80 MG/1
1 TABLET, FILM COATED ORAL
Qty: 30 | Refills: 0
Start: 2018-06-15 | End: 2018-07-14

## 2018-06-15 RX ORDER — CLOPIDOGREL BISULFATE 75 MG/1
1 TABLET, FILM COATED ORAL
Qty: 90 | Refills: 4
Start: 2018-06-15 | End: 2019-09-07

## 2018-06-15 RX ADMIN — Medication 81 MILLIGRAM(S): at 05:11

## 2018-06-15 RX ADMIN — Medication 25 MILLIGRAM(S): at 05:12

## 2018-06-15 RX ADMIN — CLOPIDOGREL BISULFATE 75 MILLIGRAM(S): 75 TABLET, FILM COATED ORAL at 05:11

## 2018-06-15 NOTE — PROGRESS NOTE ADULT - SUBJECTIVE AND OBJECTIVE BOX
HPI:  This is a 66 yo man history of CAD/MI / PCI LAD/ OM1 2015  , Eczema, BPH presents as outpatient for cardiac cath.  Pt has been experiencing chest burning with exertion and shortness of breath.   Pt had abnormal stress test on 5/29/18 revealing small area of inferior-lateral ischemia with EF 73%    Dr. Freddy Marcial- CArdio (14 Jun 2018 12:08)    Cardiac cath 6/14/18 mid D1 70% POBA, PLCx 90% YARIEL x 1     MEDICATIONS  (STANDING):  aspirin  chewable 81 milliGRAM(s) Oral daily  atorvastatin 40 milliGRAM(s) Oral at bedtime  clopidogrel Tablet 75 milliGRAM(s) Oral daily  metoprolol tartrate 25 milliGRAM(s) Oral two times a day    MEDICATIONS  (PRN):  acetaminophen   Tablet. 650 milliGRAM(s) Oral every 6 hours PRN Moderate Pain (4 - 6)    Vital Signs Last 24 Hrs  T(C): 36.3 (15 Reginaldo 2018 04:39), Max: 36.7 (14 Jun 2018 21:20)  T(F): 97.4 (15 Reginaldo 2018 04:39), Max: 98 (14 Jun 2018 21:20)  HR: 62 (15 Reginaldo 2018 04:39) (55 - 77)  BP: 110/70 (15 Reginaldo 2018 04:39) (106/60 - 135/70)  BP(mean): --  RR: 16 (15 Reginaldo 2018 04:39) (16 - 18)  SpO2: 96% (15 Reginaldo 2018 04:39) (93% - 96%)  06-15    139  |  104  |  21  ----------------------------<  122<H>  3.7   |  22  |  1.13    Ca    8.6      15 Reginaldo 2018 00:44                          15.4   7.7   )-----------( 141      ( 15 Reginaldo 2018 00:44 )             44.4

## 2018-06-15 NOTE — PROGRESS NOTE ADULT - ASSESSMENT
Tele:  SR 60-75  EKG:  SB 52    Cardiovascular: chest pain free, SR 60-75, normal S1, S2, no murmurs, rubs, gallops  Respiratory: Lungs clear to auscultation  Gastrointestinal: +BS, soft, non-tender, non-distended	  Neurologic: AAOx3  Extremities: No LE edema  Vascular: Peripheral pulses palpable 2+ bilaterally RRA site small resolving hematoma, H/H stable no S/S infection

## 2018-06-28 ENCOUNTER — TRANSCRIPTION ENCOUNTER (OUTPATIENT)
Age: 68
End: 2018-06-28

## 2018-07-10 ENCOUNTER — EMERGENCY (EMERGENCY)
Facility: HOSPITAL | Age: 68
LOS: 1 days | Discharge: HOME | End: 2018-07-10
Attending: EMERGENCY MEDICINE
Payer: COMMERCIAL

## 2018-07-10 VITALS
DIASTOLIC BLOOD PRESSURE: 64 MMHG | OXYGEN SATURATION: 96 % | SYSTOLIC BLOOD PRESSURE: 104 MMHG | TEMPERATURE: 99 F | RESPIRATION RATE: 20 BRPM | HEIGHT: 65 IN | WEIGHT: 166.01 LBS | HEART RATE: 74 BPM

## 2018-07-10 VITALS
HEART RATE: 59 BPM | SYSTOLIC BLOOD PRESSURE: 114 MMHG | OXYGEN SATURATION: 97 % | RESPIRATION RATE: 18 BRPM | TEMPERATURE: 98 F | DIASTOLIC BLOOD PRESSURE: 73 MMHG

## 2018-07-10 DIAGNOSIS — S82.91XA UNSPECIFIED FRACTURE OF RIGHT LOWER LEG, INITIAL ENCOUNTER FOR CLOSED FRACTURE: Chronic | ICD-10-CM

## 2018-07-10 LAB
ALBUMIN SERPL ELPH-MCNC: 3.8 G/DL — SIGNIFICANT CHANGE UP (ref 3.3–5)
ALP SERPL-CCNC: 81 U/L — SIGNIFICANT CHANGE UP (ref 40–120)
ALT FLD-CCNC: 43 U/L — SIGNIFICANT CHANGE UP (ref 10–45)
ANION GAP SERPL CALC-SCNC: 13 MMOL/L — SIGNIFICANT CHANGE UP (ref 5–17)
AST SERPL-CCNC: 24 U/L — SIGNIFICANT CHANGE UP (ref 10–40)
BASOPHILS # BLD AUTO: 0.1 K/UL — SIGNIFICANT CHANGE UP (ref 0–0.2)
BASOPHILS NFR BLD AUTO: 0.8 % — SIGNIFICANT CHANGE UP (ref 0–2)
BILIRUB SERPL-MCNC: 0.5 MG/DL — SIGNIFICANT CHANGE UP (ref 0.2–1.2)
BUN SERPL-MCNC: 16 MG/DL — SIGNIFICANT CHANGE UP (ref 7–23)
CALCIUM SERPL-MCNC: 8.9 MG/DL — SIGNIFICANT CHANGE UP (ref 8.4–10.5)
CHLORIDE SERPL-SCNC: 103 MMOL/L — SIGNIFICANT CHANGE UP (ref 96–108)
CO2 SERPL-SCNC: 25 MMOL/L — SIGNIFICANT CHANGE UP (ref 22–31)
CREAT SERPL-MCNC: 1.06 MG/DL — SIGNIFICANT CHANGE UP (ref 0.5–1.3)
EOSINOPHIL # BLD AUTO: 0.4 K/UL — SIGNIFICANT CHANGE UP (ref 0–0.5)
EOSINOPHIL NFR BLD AUTO: 5.7 % — SIGNIFICANT CHANGE UP (ref 0–6)
GAS PNL BLDV: SIGNIFICANT CHANGE UP
GLUCOSE SERPL-MCNC: 102 MG/DL — HIGH (ref 70–99)
HCT VFR BLD CALC: 43.1 % — SIGNIFICANT CHANGE UP (ref 39–50)
HGB BLD-MCNC: 15.1 G/DL — SIGNIFICANT CHANGE UP (ref 13–17)
LYMPHOCYTES # BLD AUTO: 1.6 K/UL — SIGNIFICANT CHANGE UP (ref 1–3.3)
LYMPHOCYTES # BLD AUTO: 21.6 % — SIGNIFICANT CHANGE UP (ref 13–44)
MCHC RBC-ENTMCNC: 31.8 PG — SIGNIFICANT CHANGE UP (ref 27–34)
MCHC RBC-ENTMCNC: 35 GM/DL — SIGNIFICANT CHANGE UP (ref 32–36)
MCV RBC AUTO: 90.7 FL — SIGNIFICANT CHANGE UP (ref 80–100)
MONOCYTES # BLD AUTO: 0.6 K/UL — SIGNIFICANT CHANGE UP (ref 0–0.9)
MONOCYTES NFR BLD AUTO: 8.1 % — SIGNIFICANT CHANGE UP (ref 2–14)
NEUTROPHILS # BLD AUTO: 4.9 K/UL — SIGNIFICANT CHANGE UP (ref 1.8–7.4)
NEUTROPHILS NFR BLD AUTO: 63.8 % — SIGNIFICANT CHANGE UP (ref 43–77)
NT-PROBNP SERPL-SCNC: 36 PG/ML — SIGNIFICANT CHANGE UP (ref 0–300)
PLATELET # BLD AUTO: 218 K/UL — SIGNIFICANT CHANGE UP (ref 150–400)
POTASSIUM SERPL-MCNC: 3.8 MMOL/L — SIGNIFICANT CHANGE UP (ref 3.5–5.3)
POTASSIUM SERPL-SCNC: 3.8 MMOL/L — SIGNIFICANT CHANGE UP (ref 3.5–5.3)
PROT SERPL-MCNC: 7.5 G/DL — SIGNIFICANT CHANGE UP (ref 6–8.3)
RBC # BLD: 4.75 M/UL — SIGNIFICANT CHANGE UP (ref 4.2–5.8)
RBC # FLD: 11.4 % — SIGNIFICANT CHANGE UP (ref 10.3–14.5)
SODIUM SERPL-SCNC: 141 MMOL/L — SIGNIFICANT CHANGE UP (ref 135–145)
TROPONIN T, HIGH SENSITIVITY RESULT: 8 NG/L — SIGNIFICANT CHANGE UP (ref 0–51)
TROPONIN T, HIGH SENSITIVITY RESULT: <6 NG/L — SIGNIFICANT CHANGE UP (ref 0–51)
WBC # BLD: 7.6 K/UL — SIGNIFICANT CHANGE UP (ref 3.8–10.5)
WBC # FLD AUTO: 7.6 K/UL — SIGNIFICANT CHANGE UP (ref 3.8–10.5)

## 2018-07-10 PROCEDURE — 82330 ASSAY OF CALCIUM: CPT

## 2018-07-10 PROCEDURE — 80053 COMPREHEN METABOLIC PANEL: CPT

## 2018-07-10 PROCEDURE — 82435 ASSAY OF BLOOD CHLORIDE: CPT

## 2018-07-10 PROCEDURE — 85027 COMPLETE CBC AUTOMATED: CPT

## 2018-07-10 PROCEDURE — 84484 ASSAY OF TROPONIN QUANT: CPT

## 2018-07-10 PROCEDURE — 99285 EMERGENCY DEPT VISIT HI MDM: CPT | Mod: 25

## 2018-07-10 PROCEDURE — 84295 ASSAY OF SERUM SODIUM: CPT

## 2018-07-10 PROCEDURE — 82947 ASSAY GLUCOSE BLOOD QUANT: CPT

## 2018-07-10 PROCEDURE — 83605 ASSAY OF LACTIC ACID: CPT

## 2018-07-10 PROCEDURE — 71046 X-RAY EXAM CHEST 2 VIEWS: CPT

## 2018-07-10 PROCEDURE — 71046 X-RAY EXAM CHEST 2 VIEWS: CPT | Mod: 26

## 2018-07-10 PROCEDURE — 99283 EMERGENCY DEPT VISIT LOW MDM: CPT | Mod: 25

## 2018-07-10 PROCEDURE — 93010 ELECTROCARDIOGRAM REPORT: CPT | Mod: NC

## 2018-07-10 PROCEDURE — 84132 ASSAY OF SERUM POTASSIUM: CPT

## 2018-07-10 PROCEDURE — 93005 ELECTROCARDIOGRAM TRACING: CPT

## 2018-07-10 PROCEDURE — 82803 BLOOD GASES ANY COMBINATION: CPT

## 2018-07-10 PROCEDURE — 85014 HEMATOCRIT: CPT

## 2018-07-10 PROCEDURE — 83880 ASSAY OF NATRIURETIC PEPTIDE: CPT

## 2018-07-10 RX ORDER — AZITHROMYCIN 500 MG/1
500 TABLET, FILM COATED ORAL ONCE
Qty: 0 | Refills: 0 | Status: COMPLETED | OUTPATIENT
Start: 2018-07-10 | End: 2018-07-10

## 2018-07-10 RX ORDER — AZITHROMYCIN 500 MG/1
1 TABLET, FILM COATED ORAL
Qty: 4 | Refills: 0
Start: 2018-07-10 | End: 2018-07-13

## 2018-07-10 RX ADMIN — Medication 600 MILLIGRAM(S): at 10:29

## 2018-07-10 RX ADMIN — AZITHROMYCIN 500 MILLIGRAM(S): 500 TABLET, FILM COATED ORAL at 10:48

## 2018-07-10 NOTE — ED PROVIDER NOTE - PHYSICAL EXAMINATION
GENERAL: well appearing in no acute distress.   HEAD:  Atraumatic, Normocephalic  EYES: EOMI, PERRLA, conjunctiva and sclera clear  ENT: MMM; oropharynx clear  NECK: Supple, No JVD  CHEST/LUNG: Clear to auscultation bilaterally; No wheeze  HEART: Regular rate and rhythm; No murmurs, rubs, or gallops  ABDOMEN: Soft, Nontender, Nondistended; Bowel sounds present  EXTREMITIES:  2+ Peripheral Pulses, No clubbing, cyanosis, or edema  PSYCH: AAOx3  NEUROLOGY: no focal motor or sensory deficits. 5/5 muscle strength in all extremities.   SKIN: No rashes or lesions

## 2018-07-10 NOTE — ED PROVIDER NOTE - EKG #1 DATE/TIME
Subjective   HPI Comments: Mr. Tod Ambrose is a 65 y.o.male who presents to the ED with complaints of hyperglycemia. Mr. Ambrose was brought to the ED by EMS  After receiving a call by Mr. Ambrose's sister. Mr. Ambrose currently lives in his sister's basement and she had not seen him in 2 days. She went to check on his downstairs and found him unresponsive and called EMS. EMS reports a blood glucose of 22 and administered 25 grams of IV D50. Blood sugar on arrival was 74. Patient does not take insulin and the last time he states drinking was 4 weeks ago. History is very limited secondary to his AMS.     Patient is a 65 y.o. male presenting with hypoglycemia.   History provided by:  EMS personnel  History limited by:  Mental status change  Hypoglycemia   Initial blood sugar:  22  Blood sugar after intervention:  78  Severity:  Severe  Onset quality:  Sudden  Timing:  Constant  Progression:  Improving  Context: treatment noncompliance    Relieved by:  IV glucose      Review of Systems   Unable to perform ROS: Mental status change       Past Medical History:   Diagnosis Date   • Alcoholic hepatitis with ascites    • COPD (chronic obstructive pulmonary disease)    • Diastolic dysfunction    • Polycythemia    • Thrombocytopenia        No Known Allergies    Past Surgical History:   Procedure Laterality Date   • PARACENTESIS         History reviewed. No pertinent family history.    Social History     Social History   • Marital status: Single     Spouse name: N/A   • Number of children: N/A   • Years of education: N/A     Social History Main Topics   • Smoking status: Never Smoker   • Smokeless tobacco: None   • Alcohol use Yes      Comment: daily unknown amount   • Drug use: No   • Sexual activity: Not Asked     Other Topics Concern   • None     Social History Narrative         Objective   Physical Exam   Constitutional: He appears well-developed and well-nourished. He appears lethargic.   HENT:   Head: Normocephalic and  atraumatic.   Nose: Nose normal.   Eyes: Conjunctivae are normal. No scleral icterus.   Neck: Normal range of motion. Neck supple.   Cardiovascular: Normal rate, regular rhythm and normal heart sounds.    Pulmonary/Chest: Effort normal and breath sounds normal.   Patient has a heavy cough   Abdominal: Soft. There is no tenderness. A hernia is present.   Patient has a mass present on his periumbilical area c/w hernia which cannot be reduced but he states this is chronic.   Musculoskeletal: Normal range of motion. He exhibits no tenderness.   Neurological: He appears lethargic.   Skin: Skin is warm and dry.   There are areas of skin breakdown under his pannus and inguinal folds.   Nursing note and vitals reviewed.      Procedures         ED Course  ED Course   Comment By Time   Chest x-ray and ABG are negative.  Urinalysis is consistent with urinary tract infection. Ramiro Ruiz MD 10/08 1136   Lactic acid is very severely elevated.  I have ordered additional IV fluids.  I am awaiting remainder of his lab work.  We are trying to obtain a CAT scan of his head although the CT personnel of advise me that he is not able to hold still.  They have requested sedation although given his altered mental status to begin with I cannot do that Ramiro Ruiz MD 10/08 1231   Will obtain ct a/p to eval for intra abd source of sepsis and lactic acidosis, poss from prolonged hypoglycemis ? Ramiro Ruiz MD 10/08 1306   I had a long talk with his sister.  I discussed the grim prognosis associated with a lactic acidosis this severe.  He is becoming more restless.  The CT tech is advised that they cannot obtain a CAT scan of his belly unless we were able to sedate him.  We'll proceed with Ativan.  I have paged the ICU physician on-call as well.  His sister confirms that the periumbilical hernia is chronic and unchanged Ramiro Ruiz MD 10/08 1321   Dr. Ruiz discussed case with Dr. Castro, intensivist, who will admit the patient  to the ICU. Fay Dailey 10/08 1333                     [facility]    Final diagnoses:   Sepsis, due to unspecified organism   Lactic acidosis       Documentation assistance provided by hector Boogie.  Information recorded by the scribe was done at my direction and has been verified and validated by me.     Shola Boogie  10/08/17 1110       Fay Dailey  10/08/17 1400       Ramiro Ruiz MD  10/12/17 0652     10-Jul-2018 10:13

## 2018-07-10 NOTE — ED PROVIDER NOTE - PLAN OF CARE
1. Take Azithromycin as prescribed for four more days.  2. Take Mucinex as directed for cough and mucus production  3. Follow up with your primary care doctor. Return to the emergency department for worsening chest pain or difficulty breathing.

## 2018-07-10 NOTE — ED ADULT NURSE NOTE - OBJECTIVE STATEMENT
0845 67 yr old HM c/o sternal, back and throat pain  x 2 wks associated with cough. eval at [facility] 6/28/2018 and was started on Benzonotate. s/p cardiac stents 6/14/2018. A&Ox4. ambulatory. color pink. skin W&D. Denies fever or chills

## 2018-07-10 NOTE — ED PROVIDER NOTE - PMH
BPH (benign prostatic hyperplasia)    Diverticulosis    Eczema    ELLIOTT on CPAP BPH (benign prostatic hyperplasia)    CAD (coronary artery disease)    Diverticulosis    Eczema    ELLIOTT on CPAP    Stented coronary artery

## 2018-07-10 NOTE — ED PROVIDER NOTE - CARE PLAN
Assessment and plan of treatment:	1. Take Azithromycin as prescribed for four more days.  2. Take Mucinex as directed for cough and mucus production  3. Follow up with your primary care doctor. Return to the emergency department for worsening chest pain or difficulty breathing. Principal Discharge DX:	Cough  Assessment and plan of treatment:	1. Take Azithromycin as prescribed for four more days.  2. Take Mucinex as directed for cough and mucus production  3. Follow up with your primary care doctor. Return to the emergency department for worsening chest pain or difficulty breathing.

## 2018-07-10 NOTE — ED PROVIDER NOTE - OBJECTIVE STATEMENT
68 y/o M hx of CAD s/p PCI to LCx ('18) & mLAD (remote), ELLIOTT on CPAP, BPH presents with shortness of breath. 68 y/o M hx of CAD s/p PCI to LCx ('18) & mLAD/OM1 ('15), ELLIOTT on CPAP, BPH presents with shortness of breath. 66 y/o M hx of CAD s/p PCI to LCx ('18) & mLAD/OM1 ('15), ELLIOTT on CPAP, BPH presents with productive cough & fevers x 3 weeks.    Patient notes baseline exertional dyspnea for which he initially underwent cath but has noted pleuritic chest pain and productive cough. Patient states that mucous has been getting darker and feels subjective fevers. Notes chest discomfort with deep breath and coughing but none at rest or with ambulation. Patient notes increased sputum production so much that it has been difficult to sleep at night but otherwise no change exercise tolerance. Patient given tessalon pearls with little relief.

## 2018-07-10 NOTE — ED PROVIDER NOTE - MEDICAL DECISION MAKING DETAILS
68 y/o M hx of CAD s/p PCI to LCx ('18) & mLAD/OM1 ('15), ELLIOTT on CPAP, BPH presents with productive cough & fevers x 3 weeks. Likely bronchitis vs. PNA; however given cardiac hx would need to r/o ACS although low suspicion from H&P. Plan: EKG, CXR, mucolytics, CE, reassess. 66 y/o M hx of CAD s/p PCI to LCx ('18) & mLAD/OM1 ('15), ELLIOTT on CPAP, BPH presents with productive cough & fevers x 3 weeks. Likely bronchitis vs. PNA; however given cardiac hx would need to r/o ACS although low suspicion from H&P. Plan: EKG, CXR, mucolytics, CE, reassess.    SHELLEY Sanabria MD: 66 y/o M hx of CAD s/p PCI to LCx ('18) & mLAD/OM1 ('15), ELLIOTT on CPAP, BPH presents with productive cough & fevers x 3 weeks.    SHELLEY Sanabria MD: Patient notes baseline exertional dyspnea for which he initially underwent cath but has noted pleuritic chest pain and productive cough. Patient states that mucous has been getting darker and feels subjective fevers. Notes chest discomfort with deep breath and coughing but none at rest or with ambulation. Patient notes increased sputum production so much that it has been difficult to sleep at night but otherwise no change exercise tolerance. Patient given tessalon pearls with little relief. 66 y/o M hx of CAD s/p PCI to LCx ('18) & mLAD/OM1 ('15), ELLIOTT on CPAP, BPH presents with productive cough & fevers x 3 weeks. Likely bronchitis vs. PNA; however given cardiac hx would need to r/o ACS although low suspicion from H&P. Plan: EKG, CXR, mucolytics, CE, reassess.    SHELLEY Sanabria MD: 66 y/o M hx of CAD s/p PCI to LCx ('18) & mLAD/OM1 ('15), ELLIOTT on CPAP, BPH presents with productive cough & fevers x 3 weeks.  Patient notes baseline exertional dyspnea for which he initially underwent cath. x 3 weeks has had productive cough with white sputum and pain in chest only when he coughs. but has noted pleuritic chest pain and productive cough. On my hx, pt denied pleuritic CP. Patient notes increased sputum production so much that it has been difficult to sleep at night but otherwise no change exercise tolerance. Patient given tessalon pearls with little relief. Denies trauma, travel, immobilization, leg pain/swelling.  DDx: PNA, bronchitis, viral syndrome, CHF. Less likely ACS or PE given atypical presentation with cough and mucus production, CP / SOB not major complaint at this time. Plan: basic labs, trop, CXR, ekg, re-assess

## 2018-07-27 PROBLEM — Z95.5 PRESENCE OF CORONARY ANGIOPLASTY IMPLANT AND GRAFT: Chronic | Status: ACTIVE | Noted: 2018-07-10

## 2018-08-09 ENCOUNTER — NON-APPOINTMENT (OUTPATIENT)
Age: 68
End: 2018-08-09

## 2018-08-09 ENCOUNTER — APPOINTMENT (OUTPATIENT)
Dept: CARDIOLOGY | Facility: CLINIC | Age: 68
End: 2018-08-09
Payer: COMMERCIAL

## 2018-08-09 VITALS
HEART RATE: 67 BPM | HEIGHT: 65 IN | OXYGEN SATURATION: 94 % | WEIGHT: 168 LBS | SYSTOLIC BLOOD PRESSURE: 119 MMHG | BODY MASS INDEX: 27.99 KG/M2 | DIASTOLIC BLOOD PRESSURE: 68 MMHG

## 2018-08-09 PROCEDURE — 93000 ELECTROCARDIOGRAM COMPLETE: CPT

## 2018-08-09 PROCEDURE — 99214 OFFICE O/P EST MOD 30 MIN: CPT

## 2018-11-27 NOTE — ED ADULT NURSE NOTE - CAS TRG GENERAL AIRWAY, MLM
CC:  Ladonna Scott is here today for follow-up right shoulder pain, requesting cortisone injection.    Referring MD   PCP Luigi Hernandez MD   Medications: medications verified, no change  Refills needed today?  NO  denies known Latex allergy or symptoms of Latex sensitivity.  Patient would like communication of their results via:      Cell Phone:   Telephone Information:   Mobile [phone]     Okay to leave a message containing results? Yes  Tobacco history: verified               
CHIEF COMPLAINT:    Chief Complaint   Patient presents with   • Shoulder     follow-up right shoulder pain, requesting cortisone injection        PROCEDURE NOTE:  The risks and benefits of the injection were discussed. Risks include but are not limited to fever, bleeding, pain, failure to resolve pain or worsening pain, and blood sugar elevation. Patient understands these risks and wishes to proceed with the treatment. There were no assurances or guarantees given as to the results of the injection.    Description of Procedure:  The right shoulder was sterilely prepped, 4 mL of 1% Lidocaine and 80 mg Depo Medrol was injected without complications.  The patient tolerated the procedure well.  A Band-Aid was applied.  Post injection instructions were given and questions were answered.      On 11/27/2018, Tonia ZUNIGA scribed the services personally performed by Dr. Juanito Pitts MD    The documentation recorded by the scribe accurately and completely reflects the service(s) I personally performed and the decisions made by me.       
Patent

## 2019-02-13 ENCOUNTER — APPOINTMENT (OUTPATIENT)
Dept: CARDIOLOGY | Facility: CLINIC | Age: 69
End: 2019-02-13
Payer: COMMERCIAL

## 2019-02-13 ENCOUNTER — NON-APPOINTMENT (OUTPATIENT)
Age: 69
End: 2019-02-13

## 2019-02-13 VITALS
OXYGEN SATURATION: 94 % | SYSTOLIC BLOOD PRESSURE: 116 MMHG | HEIGHT: 65 IN | DIASTOLIC BLOOD PRESSURE: 69 MMHG | HEART RATE: 69 BPM | WEIGHT: 167 LBS | BODY MASS INDEX: 27.82 KG/M2

## 2019-02-13 PROCEDURE — 93000 ELECTROCARDIOGRAM COMPLETE: CPT

## 2019-02-13 PROCEDURE — 99214 OFFICE O/P EST MOD 30 MIN: CPT

## 2019-02-13 NOTE — REASON FOR VISIT
[Follow-Up - Clinic] : a clinic follow-up of [Abnormal Test Result] : an abnormal test result [Coronary Artery Disease] : coronary artery disease [Hyperlipidemia] : hyperlipidemia [Hypertension] : hypertension [Medication Management] : Medication management

## 2019-02-14 NOTE — PATIENT PROFILE ADULT. - TEACHING/LEARNING FACTORS INFLUENCE READINESS TO LEARN
Telephone Encounter by Daniel Carrizales MD at 11/19/18 04:27 PM     Author:  Daniel Carrizales MD Service:  (none) Author Type:  Physician     Filed:  11/19/18 04:28 PM Encounter Date:  11/19/2018 Status:  Signed     :  Daniel Carrizales MD (Physician)            Thanks for the note. I have requested my staff to arrange for inpatient stay for 48 hrs at [facility] or [facility].    TPN will be started in house and discharged with prescription for [address] [place] [address] to take over from there.[RP1.1M]     Electronically Signed by:    Daniel Carrizales MD , 11/19/2018[RP1.2T]          Revision History        User Key Date/Time User Provider Type Action    > RP1.2 11/19/18 04:28 PM Daniel Carrizales MD Physician Sign     RP1.1 11/19/18 04:27 PM Daniel Carrizales MD Physician     M - Manual, T - Template anxiety/pain

## 2019-03-24 NOTE — DISCUSSION/SUMMARY
[Patient] : the patient [Risks] : risks [Benefits] : benefits [Alternatives] : alternatives [___ Month(s)] : [unfilled] month(s) [With Me] : with me [FreeTextEntry1] : 67M with prior NSTEMI s/p PCI with YARIEL to pLAD and OM1. Preserved LV function.\par With prior abn stress test -s/p Lcx PCI with YARIEL and POBA to small D1  now doing well\par No changes to meds\par Recheck ischemic eval\par

## 2019-03-24 NOTE — REVIEW OF SYSTEMS
[Feeling Fatigued] : feeling fatigued [see HPI] : see HPI [Negative] : Neurological [Dyspnea on exertion] : not dyspnea during exertion [Fever] : no fever [Chills] : no chills [Feeling Poorly] : not feeling poorly [Feeling Tired] : not feeling tired [Recent Weight Gain (___ Lbs)] : no recent weight gain [Recent Weight Loss (___ Lbs)] : no recent weight loss [Eye Pain] : no eye pain [Red Eyes] : eyes not red [Eyesight Problems] : no eyesight problems [Discharge From Eyes] : no purulent discharge from the eyes [Dry Eyes] : no dryness of the eyes [Eyes Itch] : no itching of the eyes [Earache] : no earache [Loss Of Hearing] : no hearing loss [Nosebleeds] : no nosebleeds [Nasal Discharge] : no nasal discharge [Sore Throat] : no sore throat [Hoarseness] : no hoarseness [Heart Rate Is Slow] : the heart rate was not slow [Heart Rate Is Fast] : the heart rate was not fast [Chest Pain] : no chest pain [Palpitations] : no palpitations [Leg Claudication] : no intermittent leg claudication [Lower Ext Edema] : no extremity edema [Shortness Of Breath] : no shortness of breath [Cough] : no cough [Orthopnea] : no orthopnea [Wheezing] : no wheezing [SOB on Exertion] : no shortness of breath during exertion [PND] : no PND [Abdominal Pain] : no abdominal pain [Vomiting] : no vomiting [Constipation] : no constipation [Diarrhea] : no diarrhea [Heartburn] : no heartburn [Melena] : no melena [Dysuria] : no dysuria [Incontinence] : no incontinence [Hesitancy] : no urinary hesitancy [Nocturia] : no nocturia [Genital Lesion] : no genital lesions [Testicular Pain] : no testicular pain [Arthralgias] : no arthralgias [Joint Pain] : no joint pain [Joint Swelling] : no joint swelling [Joint Stiffness] : no joint stiffness [Limb Pain] : no limb pain [Limb Swelling] : no limb swelling [Skin Lesions] : no skin lesions [Skin Wound] : no skin wound [Itching] : no itching [Change In A Mole] : no change in a mole [Dry Skin] : no dry skin [An Unusual Growth] : no unusual growth on the skin [Confused] : no confusion [Convulsions] : no convulsions [Dizziness] : no dizziness [Fainting] : no fainting [Limb Weakness] : no limb weakness [Difficulty Walking] : no difficulty walking [Suicidal] : not suicidal [Sleep Disturbances] : no sleep disturbances [Anxiety] : no anxiety [Depression] : no depression [Change In Personality] : no personality change [Emotional Problems] : no emotional problems [Proptosis] : no proptosis [Hot Flashes] : no hot flashes [Muscle Weakness] : no muscle weakness [Erectile Dysfunction] : no erectile dysfunction [Deepening Of The Voice] : no deepening of the voice [Feelings Of Weakness] : no feelings of weakness [Easy Bleeding] : no tendency for easy bleeding [Easy Bruising] : no tendency for easy bruising [Swollen Glands] : no swollen glands [Swollen Glands In The Neck] : no swollen glands in the neck

## 2019-03-24 NOTE — HISTORY OF PRESENT ILLNESS
[FreeTextEntry1] : Ernesto is returning for a f/u\par \par Feels well\par No CP\par No syncope or palpitations\par No falls or blackouts\par Mild BILL\par No LE edema\par \par

## 2019-03-24 NOTE — PHYSICAL EXAM
[Normal Appearance] : normal appearance [General Appearance - In No Acute Distress] : no acute distress [Normal Conjunctiva] : the conjunctiva exhibited no abnormalities [Normal Oral Mucosa] : normal oral mucosa [Normal Jugular Venous A Waves Present] : normal jugular venous A waves present [Normal Jugular Venous V Waves Present] : normal jugular venous V waves present [No Jugular Venous Rojas A Waves] : no jugular venous rojas A waves [Respiration, Rhythm And Depth] : normal respiratory rhythm and effort [Exaggerated Use Of Accessory Muscles For Inspiration] : no accessory muscle use [Auscultation Breath Sounds / Voice Sounds] : lungs were clear to auscultation bilaterally [Heart Rate And Rhythm] : heart rate and rhythm were normal [Heart Sounds] : normal S1 and S2 [Murmurs] : no murmurs present [Edema] : no peripheral edema present [Abdomen Soft] : soft [Abnormal Walk] : normal gait [Nail Clubbing] : no clubbing of the fingernails [Cyanosis, Localized] : no localized cyanosis [Petechial Hemorrhages (___cm)] : no petechial hemorrhages [] : no ischemic changes [Skin Color & Pigmentation] : normal skin color and pigmentation [Oriented To Time, Place, And Person] : oriented to person, place, and time [Affect] : the affect was normal [General Appearance - Well Developed] : well developed [General Appearance - Well Nourished] : well nourished

## 2019-08-01 NOTE — ED ADULT TRIAGE NOTE - TEMPERATURE IN FAHRENHEIT (DEGREES F)
Called patient to let him know letter was ready to . No answer no voicemail. Mailed letter to patient.   98.9

## 2019-12-09 ENCOUNTER — OTHER (OUTPATIENT)
Age: 69
End: 2019-12-09

## 2020-02-06 ENCOUNTER — NON-APPOINTMENT (OUTPATIENT)
Age: 70
End: 2020-02-06

## 2020-02-06 ENCOUNTER — APPOINTMENT (OUTPATIENT)
Dept: CARDIOLOGY | Facility: CLINIC | Age: 70
End: 2020-02-06
Payer: COMMERCIAL

## 2020-02-06 VITALS
SYSTOLIC BLOOD PRESSURE: 115 MMHG | HEIGHT: 65 IN | WEIGHT: 170 LBS | HEART RATE: 76 BPM | DIASTOLIC BLOOD PRESSURE: 70 MMHG | BODY MASS INDEX: 28.32 KG/M2 | OXYGEN SATURATION: 94 %

## 2020-02-06 PROCEDURE — 93000 ELECTROCARDIOGRAM COMPLETE: CPT

## 2020-02-06 PROCEDURE — 99213 OFFICE O/P EST LOW 20 MIN: CPT

## 2020-02-06 NOTE — PHYSICAL EXAM
[General Appearance - Well Developed] : well developed [General Appearance - Well Nourished] : well nourished [Normal Appearance] : normal appearance [Normal Oral Mucosa] : normal oral mucosa [Normal Conjunctiva] : the conjunctiva exhibited no abnormalities [General Appearance - In No Acute Distress] : no acute distress [Normal Jugular Venous A Waves Present] : normal jugular venous A waves present [No Jugular Venous Rojas A Waves] : no jugular venous rojas A waves [Normal Jugular Venous V Waves Present] : normal jugular venous V waves present [Respiration, Rhythm And Depth] : normal respiratory rhythm and effort [Exaggerated Use Of Accessory Muscles For Inspiration] : no accessory muscle use [Heart Rate And Rhythm] : heart rate and rhythm were normal [Heart Sounds] : normal S1 and S2 [Auscultation Breath Sounds / Voice Sounds] : lungs were clear to auscultation bilaterally [Edema] : no peripheral edema present [Murmurs] : no murmurs present [Nail Clubbing] : no clubbing of the fingernails [Abnormal Walk] : normal gait [Abdomen Soft] : soft [Petechial Hemorrhages (___cm)] : no petechial hemorrhages [] : no ischemic changes [Cyanosis, Localized] : no localized cyanosis [Oriented To Time, Place, And Person] : oriented to person, place, and time [Skin Color & Pigmentation] : normal skin color and pigmentation [Affect] : the affect was normal

## 2020-02-06 NOTE — REASON FOR VISIT
[Follow-Up - Clinic] : a clinic follow-up of [Abnormal Test Result] : an abnormal test result [Coronary Artery Disease] : coronary artery disease [Hyperlipidemia] : hyperlipidemia [Medication Management] : Medication management [Hypertension] : hypertension

## 2020-02-07 NOTE — REVIEW OF SYSTEMS
[Feeling Fatigued] : feeling fatigued [see HPI] : see HPI [Negative] : Respiratory [Dyspnea on exertion] : dyspnea during exertion [Fever] : no fever [Chills] : no chills [Feeling Poorly] : not feeling poorly [Recent Weight Gain (___ Lbs)] : no recent weight gain [Feeling Tired] : not feeling tired [Eye Pain] : no eye pain [Recent Weight Loss (___ Lbs)] : no recent weight loss [Eyesight Problems] : no eyesight problems [Red Eyes] : eyes not red [Discharge From Eyes] : no purulent discharge from the eyes [Eyes Itch] : no itching of the eyes [Earache] : no earache [Dry Eyes] : no dryness of the eyes [Loss Of Hearing] : no hearing loss [Nasal Discharge] : no nasal discharge [Nosebleeds] : no nosebleeds [Sore Throat] : no sore throat [Heart Rate Is Slow] : the heart rate was not slow [Hoarseness] : no hoarseness [Chest Pain] : no chest pain [Heart Rate Is Fast] : the heart rate was not fast [Leg Claudication] : no intermittent leg claudication [Palpitations] : no palpitations [Lower Ext Edema] : no extremity edema [Shortness Of Breath] : no shortness of breath [Cough] : no cough [Orthopnea] : no orthopnea [Wheezing] : no wheezing [SOB on Exertion] : no shortness of breath during exertion [PND] : no PND [Vomiting] : no vomiting [Abdominal Pain] : no abdominal pain [Constipation] : no constipation [Melena] : no melena [Diarrhea] : no diarrhea [Heartburn] : no heartburn [Dysuria] : no dysuria [Incontinence] : no incontinence [Genital Lesion] : no genital lesions [Hesitancy] : no urinary hesitancy [Nocturia] : no nocturia [Arthralgias] : no arthralgias [Testicular Pain] : no testicular pain [Joint Stiffness] : no joint stiffness [Joint Swelling] : no joint swelling [Joint Pain] : no joint pain [Limb Pain] : no limb pain [Limb Swelling] : no limb swelling [Skin Lesions] : no skin lesions [Itching] : no itching [Skin Wound] : no skin wound [An Unusual Growth] : no unusual growth on the skin [Dry Skin] : no dry skin [Change In A Mole] : no change in a mole [Confused] : no confusion [Convulsions] : no convulsions [Dizziness] : no dizziness [Limb Weakness] : no limb weakness [Difficulty Walking] : no difficulty walking [Fainting] : no fainting [Suicidal] : not suicidal [Sleep Disturbances] : no sleep disturbances [Anxiety] : no anxiety [Change In Personality] : no personality change [Emotional Problems] : no emotional problems [Depression] : no depression [Proptosis] : no proptosis [Hot Flashes] : no hot flashes [Erectile Dysfunction] : no erectile dysfunction [Muscle Weakness] : no muscle weakness [Deepening Of The Voice] : no deepening of the voice [Easy Bleeding] : no tendency for easy bleeding [Feelings Of Weakness] : no feelings of weakness [Easy Bruising] : no tendency for easy bruising [Swollen Glands In The Neck] : no swollen glands in the neck [Swollen Glands] : no swollen glands

## 2020-02-07 NOTE — DISCUSSION/SUMMARY
[Patient] : the patient [Benefits] : benefits [Risks] : risks [Alternatives] : alternatives [___ Month(s)] : [unfilled] month(s) [With Me] : with me [FreeTextEntry1] : 69M with prior NSTEMI s/p PCI with YARIEL to pLAD and OM1. Preserved LV function.\par With prior abn stress test -s/p Lcx PCI with YARIEL and POBA to small D1 \par \par Given new sypmtoms I asked him to recheck ischemic eval\par

## 2020-02-26 ENCOUNTER — APPOINTMENT (OUTPATIENT)
Dept: VASCULAR SURGERY | Facility: CLINIC | Age: 70
End: 2020-02-26
Payer: COMMERCIAL

## 2020-02-26 VITALS
BODY MASS INDEX: 27.49 KG/M2 | DIASTOLIC BLOOD PRESSURE: 62 MMHG | HEART RATE: 80 BPM | TEMPERATURE: 97.7 F | WEIGHT: 165 LBS | HEIGHT: 65 IN | SYSTOLIC BLOOD PRESSURE: 101 MMHG

## 2020-02-26 DIAGNOSIS — G47.62 SLEEP RELATED LEG CRAMPS: ICD-10-CM

## 2020-02-26 DIAGNOSIS — I83.899 VARICOSE VEINS OF UNSPECIFIED LOWER EXTREMITY WITH OTHER COMPLICATIONS: ICD-10-CM

## 2020-02-26 DIAGNOSIS — R25.2 CRAMP AND SPASM: ICD-10-CM

## 2020-02-26 PROCEDURE — 99204 OFFICE O/P NEW MOD 45 MIN: CPT

## 2020-02-26 PROCEDURE — 93970 EXTREMITY STUDY: CPT

## 2020-02-26 PROCEDURE — 93923 UPR/LXTR ART STDY 3+ LVLS: CPT

## 2020-02-26 NOTE — HISTORY OF PRESENT ILLNESS
[FreeTextEntry1] : Pt c/o  bilateral leg pain swelling heaviness and discomfort worse w activity and by the end of the day\par  1 year ago \par Intensity moderate \par Pt denies recent injury, travel or thrombophilic event\par \par Pt c/o  bilateral  leg  intermittent claudication at  blocks and nightly , occasional griselda leg and foot nocturnal cramps 1x/week \par pt states that it is affecting his sleep\par Onset 10 mo ago \par Intensity mild \par \par Pt denies any recent  cardiac c/o , SOB, Chest Pain or recent heart attacks \par pt has coronary stents \par pt is employed daily from 11 pm to 6am\par pt states  le  nocturnal leg cramps are the worst \par \par \par \par

## 2020-02-26 NOTE — ASSESSMENT
[FreeTextEntry1] : Impression symptomatic arterial insuff , symptomatic venous insuff \par \par \par Plan Med Conservative management leg elevation, knee high compression stockings 15-20mm Hg (rx given), wt loss, diet control, exercise program, protective measures\par trial pletal  50 bid \par Indications risks and benefits of  left  LSV  RFA were explained to pt who understands\par pt wants to proceed \par ov w carotid duplex s/o stenosis and re eval art and venous insuff sx  1mo \par ov w clarence/pvr s/o art insuff 12mo feb 2021 \par \par \par Varicose veins are enlarged, twisted veins. Varicose veins are caused by increased blood pressure in the veins.  The blood moves towards the heart by 1-way valves in the veins. When the valves become weakened or damaged, blood can collect in the veins and pool in your lower legs (ankles). This causes the veins to become enlarged and incompetent with reflux. Sitting or standing for long periods can cause blood to pool in the leg veins, increasing the pressure within the veins. \par Risk factors for varicose veins or venous disease may include:  obesity, older age, standing or sitting for prolonged periods of time for several years, being female, pregnancy, taking oral contraceptive pills or hormone replacement, being inactive, and/or smoking. \par The most common symptoms of varicose veins are sensations in the legs, such as a heavy feeling, burning, and/or aching. However, each individual may experience symptoms differently.  Other symptoms may include:  color changes in the skin, sores on the legs, or rash.  Severe varicose veins or venous disease may eventually produce long-term mild swelling that can result in more serious skin and tissue problems, such as ulcers and non-healing sores.\par Varicose veins and venous disease are diagnosed by a complete medical history, physical examination, and diagnostic studies for varicose veins including duplex ultrasound and color-flow imaging.  \par Medical treatment for varicose veins and venous disease include:  compression stockings, sclerotherapy, endovenous ablation and/or surgical treatment with microphlebectomy.  \par \par  [Arterial/Venous Disease] : arterial/venous disease [Medication Management] : medication management [Other: _____] : [unfilled] [Foot care/Footwear] : foot care/footwear

## 2020-02-26 NOTE — PHYSICAL EXAM
[Normal Breath Sounds] : Normal breath sounds [Ankle Swelling (On Exam)] : present [Ankle Swelling Bilaterally] : bilaterally  [Varicose Veins Of Lower Extremities] : bilaterally [Ankle Swelling On The Right] : mild [] : present [2+] : right 2+ [Left Carotid Bruit] : left carotid bruit heard [Right Carotid Bruit] : right carotid bruit heard [1+] : left 1+ [Ankle Swelling On The Left] : moderate [No HSM] : no hepatosplenomegaly [No Rash or Lesion] : No rash or lesion [Alert] : alert [Oriented to Person] : oriented to person [Oriented to Place] : oriented to place [Oriented to Time] : oriented to time [Calm] : calm [JVD] : no jugular venous distention  [Abdomen Masses] : No abdominal masses [Tender] : was nontender [Stool Sample Taken] : No stool obtained  on rectal exam [de-identified] : wnl  [de-identified] : nad  [FreeTextEntry1] : Mild arterial insufficiency w mild to  moderate trophic skin changes \par no wounds/ulcers\par Mild moderate severe bilateral right left leg venous insufficiency \par w mild to moderate bilateral leg stasis dermatitis, hyperpigmentation in the gator area, hyperkeratosis (skin thickening)\par and moderate bilateral leg edema \par Multiple  bilateral  leg small and medium l varicose  veins and spider veins  ant medial thigh and calf and shin \par RLE Varicose veins measuring  3-4 mm in size on the thigh/calf /shin \par LLE Varicose veins measuring  3-4 mm in size on the thigh/calf /shin \par no wounds/ulcers\par  [de-identified] : wnl [de-identified] : wnl [de-identified] : cooperative [de-identified] : Leo Cranial nerves 2-12 leo grossly intact

## 2020-02-26 NOTE — DATA REVIEWED
[FreeTextEntry1] : 2/26/2020 LEESA/PVR RLE \par                                  Leo Lower Extremities with no significant arterial occlusive disease  \par                                   and w vessel calcification\par                                  Rt LEESA  1.20 Lt LEESA  1.24\par                                 \par \par 2/26/2020 Venous Doppler Leo LE  no acute dvt svt \par                            RLE sig for thigh and calf insuff collaterals\par                            LLE  LSV insuff from spj to distal calf level w insuff knee and calf  collaterals\par \par

## 2020-03-04 ENCOUNTER — OUTPATIENT (OUTPATIENT)
Dept: OUTPATIENT SERVICES | Facility: HOSPITAL | Age: 70
LOS: 1 days | End: 2020-03-04
Payer: COMMERCIAL

## 2020-03-04 ENCOUNTER — APPOINTMENT (OUTPATIENT)
Dept: CV DIAGNOSTICS | Facility: HOSPITAL | Age: 70
End: 2020-03-04

## 2020-03-04 DIAGNOSIS — S82.91XA UNSPECIFIED FRACTURE OF RIGHT LOWER LEG, INITIAL ENCOUNTER FOR CLOSED FRACTURE: Chronic | ICD-10-CM

## 2020-03-04 DIAGNOSIS — R07.89 OTHER CHEST PAIN: ICD-10-CM

## 2020-03-04 PROCEDURE — 78452 HT MUSCLE IMAGE SPECT MULT: CPT | Mod: 26

## 2020-03-04 PROCEDURE — 93016 CV STRESS TEST SUPVJ ONLY: CPT

## 2020-03-04 PROCEDURE — 78452 HT MUSCLE IMAGE SPECT MULT: CPT

## 2020-03-04 PROCEDURE — A9500: CPT

## 2020-03-04 PROCEDURE — 93017 CV STRESS TEST TRACING ONLY: CPT

## 2020-03-04 PROCEDURE — 93018 CV STRESS TEST I&R ONLY: CPT

## 2020-04-13 ENCOUNTER — APPOINTMENT (OUTPATIENT)
Dept: VASCULAR SURGERY | Facility: CLINIC | Age: 70
End: 2020-04-13

## 2020-04-20 ENCOUNTER — APPOINTMENT (OUTPATIENT)
Dept: VASCULAR SURGERY | Facility: CLINIC | Age: 70
End: 2020-04-20

## 2020-05-27 ENCOUNTER — APPOINTMENT (OUTPATIENT)
Dept: VASCULAR SURGERY | Facility: CLINIC | Age: 70
End: 2020-05-27

## 2020-07-20 ENCOUNTER — TRANSCRIPTION ENCOUNTER (OUTPATIENT)
Age: 70
End: 2020-07-20

## 2020-07-21 ENCOUNTER — APPOINTMENT (OUTPATIENT)
Dept: VASCULAR SURGERY | Facility: CLINIC | Age: 70
End: 2020-07-21
Payer: COMMERCIAL

## 2020-07-21 VITALS
HEART RATE: 60 BPM | BODY MASS INDEX: 28.32 KG/M2 | HEIGHT: 65 IN | WEIGHT: 170 LBS | DIASTOLIC BLOOD PRESSURE: 71 MMHG | TEMPERATURE: 98 F | SYSTOLIC BLOOD PRESSURE: 120 MMHG

## 2020-07-21 PROCEDURE — 99214 OFFICE O/P EST MOD 30 MIN: CPT

## 2020-07-21 NOTE — HISTORY OF PRESENT ILLNESS
[FreeTextEntry1] : Pt c/o  bilateral leg pain swelling heaviness and discomfort worse w activity and by the end of the day\par  1 year ago \par Intensity moderate \par Pt denies recent injury, travel or thrombophilic event\par \par Pt c/o  bilateral  leg  intermittent claudication at  blocks and nightly , occasional griselda leg and foot nocturnal cramps 1x/week \par pt states that it is affecting his sleep\par Onset 10 mo ago \par Intensity mild \par \par Pt denies any recent  cardiac c/o , SOB, Chest Pain or recent heart attacks \par pt has coronary stents \par pt is employed daily from 11 pm to 6am\par pt states  le  nocturnal leg cramps are the worst \par \par \par \par  [de-identified] : pt is compliant w comp stockings\par pt c/o ongoing le pain swelling heaviness and discomfort \par intensity mod  since last ov\par pt wants to proceed w endovenous procedure \par pt states no le art insuff c/o at this time

## 2020-07-21 NOTE — ASSESSMENT
[Arterial/Venous Disease] : arterial/venous disease [Other: _____] : [unfilled] [Medication Management] : medication management [Foot care/Footwear] : foot care/footwear [FreeTextEntry1] : Impression asymptomatic arterial insuff , symptomatic venous insuff \par \par \par Plan Med Conservative management leg elevation, knee high compression stockings 15-20mm Hg, wt loss, diet control, exercise program, protective measures\par continue  pletal  50 bid \par Indications risks and benefits of  left  LSV  RFA were explained to pt who understands\par pt wants to proceed \par endovenous procedure was  postponed due to covid19 pandemic\par pt will be mikayla\par ov w carotid duplex s/o stenosis next avail\par ov w clarence/pvr s/o art insuff 12mo feb 2021 \par \par \par \par Varicose veins are enlarged, twisted veins. Varicose veins are caused by increased blood pressure in the veins.  The blood moves towards the heart by 1-way valves in the veins. When the valves become weakened or damaged, blood can collect in the veins and pool in your lower legs (ankles). This causes the veins to become enlarged and incompetent with reflux. Sitting or standing for long periods can cause blood to pool in the leg veins, increasing the pressure within the veins. \par Risk factors for varicose veins or venous disease may include:  obesity, older age, standing or sitting for prolonged periods of time for several years, being female, pregnancy, taking oral contraceptive pills or hormone replacement, being inactive, and/or smoking. \par The most common symptoms of varicose veins are sensations in the legs, such as a heavy feeling, burning, and/or aching. However, each individual may experience symptoms differently.  Other symptoms may include:  color changes in the skin, sores on the legs, or rash.  Severe varicose veins or venous disease may eventually produce long-term mild swelling that can result in more serious skin and tissue problems, such as ulcers and non-healing sores.\par Varicose veins and venous disease are diagnosed by a complete medical history, physical examination, and diagnostic studies for varicose veins including duplex ultrasound and color-flow imaging.  \par Medical treatment for varicose veins and venous disease include:  compression stockings, sclerotherapy, endovenous ablation and/or surgical treatment with microphlebectomy.  \par \par

## 2020-07-21 NOTE — PHYSICAL EXAM
[Normal Breath Sounds] : Normal breath sounds [Right Carotid Bruit] : right carotid bruit heard [Left Carotid Bruit] : left carotid bruit heard [2+] : right 2+ [1+] : left 1+ [Ankle Swelling (On Exam)] : present [Ankle Swelling Bilaterally] : bilaterally  [Ankle Swelling On The Left] : moderate [Varicose Veins Of Lower Extremities] : bilaterally [] : present [Ankle Swelling On The Right] : mild [No HSM] : no hepatosplenomegaly [No Rash or Lesion] : No rash or lesion [Alert] : alert [Oriented to Person] : oriented to person [Oriented to Place] : oriented to place [Oriented to Time] : oriented to time [Calm] : calm [JVD] : no jugular venous distention  [Abdomen Masses] : No abdominal masses [Tender] : was nontender [de-identified] : nad  [Stool Sample Taken] : No stool obtained  on rectal exam [de-identified] : wnl [de-identified] : wnl  [FreeTextEntry1] : Mild arterial insufficiency w mild to  moderate trophic skin changes \par no wounds/ulcers\par Mild moderate severe bilateral leg venous insufficiency \par w mild to moderate bilateral leg stasis dermatitis, hyperpigmentation in the gator area, hyperkeratosis (skin thickening)\par and moderate bilateral leg edema \par Multiple  bilateral  leg small and medium l varicose  veins and spider veins  ant medial thigh and calf and shin \par RLE Varicose veins measuring  3-4 mm in size on the thigh/calf /shin \par LLE Varicose veins measuring  3-4 mm in size on the thigh/calf /shin \par no wounds/ulcers\par  [de-identified] : wnl [de-identified] : cooperative [de-identified] : Leo Cranial nerves 2-12 leo grossly intact

## 2020-07-30 ENCOUNTER — APPOINTMENT (OUTPATIENT)
Dept: OPHTHALMOLOGY | Facility: CLINIC | Age: 70
End: 2020-07-30
Payer: COMMERCIAL

## 2020-07-30 ENCOUNTER — NON-APPOINTMENT (OUTPATIENT)
Age: 70
End: 2020-07-30

## 2020-07-30 PROCEDURE — 92020 GONIOSCOPY: CPT

## 2020-07-30 PROCEDURE — 92004 COMPRE OPH EXAM NEW PT 1/>: CPT

## 2020-07-30 PROCEDURE — 92133 CPTRZD OPH DX IMG PST SGM ON: CPT

## 2020-08-12 ENCOUNTER — APPOINTMENT (OUTPATIENT)
Dept: VASCULAR SURGERY | Facility: CLINIC | Age: 70
End: 2020-08-12

## 2020-08-15 ENCOUNTER — APPOINTMENT (OUTPATIENT)
Dept: DISASTER EMERGENCY | Facility: CLINIC | Age: 70
End: 2020-08-15

## 2020-08-17 ENCOUNTER — APPOINTMENT (OUTPATIENT)
Dept: VASCULAR SURGERY | Facility: CLINIC | Age: 70
End: 2020-08-17

## 2020-08-18 LAB — SARS-COV-2 N GENE NPH QL NAA+PROBE: NOT DETECTED

## 2020-08-19 ENCOUNTER — APPOINTMENT (OUTPATIENT)
Dept: VASCULAR SURGERY | Facility: CLINIC | Age: 70
End: 2020-08-19
Payer: COMMERCIAL

## 2020-08-19 PROCEDURE — 36475 ENDOVENOUS RF 1ST VEIN: CPT | Mod: LT

## 2020-08-19 NOTE — PROCEDURE
[FreeTextEntry1] : left SSV RFA [FreeTextEntry3] : Procedural safety checklist and time out completed:\par Confirmed patient identification (Patient Name, , and/or medical record number including when possible affirmation by patient or parent/family/other).\par Confirmed procedure with the patient. Consent present, accurate and signed. \par Confirmed special equipment and supplies are present.\par Sterility confirmed. Position verified. \par Site/ side is marked and visible and confirmed. \par Procedure confirmed by consent. Accurate consent including side and site.\par Review of medical records, including venous ultrasound, noting correct procedure including site and side.\par MD/PA verifies presence and review of imaging studies and or written report of imaging studies.\par Agreement on the procedure to be performed\par Time out completed.\par All of the above has been confirmed by the team.\par All patient-specific concerns have been addressed. \par \par Indication: Left lower extremity varicose veins with inflammation, leg pain, leg swelling, and leg cramping.  Venous insufficiency/ reflux.\par \par Procedure: radiofrequency ablation of the left small saphenous vein. \par 	\par Mr. DEBBIE ANDRADE is a 69 year old M with a history of  left lower extremity varicose veins previously seen in the office.  Ultrasound examination demonstrated venous insufficiency. A trial of compression stockings, exercise, elevation, and pain medication was attempted without relief and definitive treatment with radiofrequency ablation was offered. \par \par The patient has come for radiofrequency ablation treatment of the left small saphenous vein. Pre-procedure COVID PCR test was negative.\par I have discussed the risks of the procedure at length with the patient. The risks discussed were inclusive of but not limited to infection, irritation at the site of infiltration of local anesthesia, and also rare risk of deep venous thrombosis and pulmonary emboli. The patient agrees to proceed with the procedure. \par The patient was escorted into the procedure room and a time out called.\par The entire limb was prepped and draped in sterile fashion. The RF fiber was placed on the sterile field and connected by a sterile cable. Actuation, temperature, and impedance testing were performed to ensure that all components were connected and operating properly. \par The patient was placed on the procedure table and local anesthesia was instilled in the skin overlying the access site. Under ultrasound guidance, the vein was punctured with a micropuncture needle, using the anterior wall technique. A guide wire was now introduced through the needle, and the needle was then exchanged over the guide wire for a 7F sheath. The guide wire was removed and the RF probe was then placed into the left small saphenous vein through the sheath and position confirmed using ultrasound guidance. After the RF probe position was verified by ultrasound, tumescent anesthesia consisting of normal saline, 1% lidocaine with 8.4% sodium bicarbonate was infiltrated, under ultrasound guidance, precisely into the perivenous compartment along the entire length of the vein until a “halo” of fluid was noted around the vein. After RF probe position was again confirmed with ultrasound imaging, RF energy was applied. The probe was gradually and carefully withdrawn at a rate of 6.5cm/20seconds. \par \par 4 cycles of RF performed using the 7 cm probe\par Total treatment time was  80 seconds.\par The total volume injected was 250  cc\par Treatment length was 13.5  cm and \par The probe is 3.5 cm from the SPJ.\par \par Estimated Blood Loss: minimal\par Repeat ultrasound of the treated vein was performed confirming successful treatment. The catheter and sheath were withdrawn and hemostasis established with direct pressure. After assuring hemostasis, a sterile 4x4 was placed on the access site and an ACE compression wrap was applied. Patient tolerated procedure well. Patient was given post-procedure instructions and follow up appointment was scheduled.\par \par \par

## 2020-08-25 ENCOUNTER — APPOINTMENT (OUTPATIENT)
Dept: VASCULAR SURGERY | Facility: CLINIC | Age: 70
End: 2020-08-25

## 2020-09-10 ENCOUNTER — APPOINTMENT (OUTPATIENT)
Dept: CARDIOLOGY | Facility: CLINIC | Age: 70
End: 2020-09-10

## 2020-09-16 ENCOUNTER — NON-APPOINTMENT (OUTPATIENT)
Age: 70
End: 2020-09-16

## 2020-09-16 ENCOUNTER — APPOINTMENT (OUTPATIENT)
Dept: CARDIOLOGY | Facility: CLINIC | Age: 70
End: 2020-09-16
Payer: COMMERCIAL

## 2020-09-16 VITALS — SYSTOLIC BLOOD PRESSURE: 118 MMHG | OXYGEN SATURATION: 94 % | HEART RATE: 73 BPM | DIASTOLIC BLOOD PRESSURE: 70 MMHG

## 2020-09-16 DIAGNOSIS — R94.39 ABNORMAL RESULT OF OTHER CARDIOVASCULAR FUNCTION STUDY: ICD-10-CM

## 2020-09-16 PROCEDURE — 99213 OFFICE O/P EST LOW 20 MIN: CPT

## 2020-09-16 PROCEDURE — 93000 ELECTROCARDIOGRAM COMPLETE: CPT

## 2020-09-16 NOTE — REVIEW OF SYSTEMS
[Feeling Fatigued] : feeling fatigued [see HPI] : see HPI [Negative] : Neurological [Dyspnea on exertion] : not dyspnea during exertion [Fever] : no fever [Chills] : no chills [Feeling Poorly] : not feeling poorly [Feeling Tired] : not feeling tired [Recent Weight Gain (___ Lbs)] : no recent weight gain [Recent Weight Loss (___ Lbs)] : no recent weight loss [Eye Pain] : no eye pain [Red Eyes] : eyes not red [Eyesight Problems] : no eyesight problems [Discharge From Eyes] : no purulent discharge from the eyes [Dry Eyes] : no dryness of the eyes [Eyes Itch] : no itching of the eyes [Earache] : no earache [Loss Of Hearing] : no hearing loss [Nosebleeds] : no nosebleeds [Nasal Discharge] : no nasal discharge [Sore Throat] : no sore throat [Hoarseness] : no hoarseness [Heart Rate Is Slow] : the heart rate was not slow [Heart Rate Is Fast] : the heart rate was not fast [Chest Pain] : no chest pain [Palpitations] : no palpitations [Leg Claudication] : no intermittent leg claudication [Lower Ext Edema] : no extremity edema [Cough] : no cough [Orthopnea] : no orthopnea [Shortness Of Breath] : no shortness of breath [Wheezing] : no wheezing [SOB on Exertion] : no shortness of breath during exertion [Abdominal Pain] : no abdominal pain [Vomiting] : no vomiting [PND] : no PND [Diarrhea] : no diarrhea [Constipation] : no constipation [Melena] : no melena [Heartburn] : no heartburn [Hesitancy] : no urinary hesitancy [Incontinence] : no incontinence [Dysuria] : no dysuria [Genital Lesion] : no genital lesions [Nocturia] : no nocturia [Testicular Pain] : no testicular pain [Arthralgias] : no arthralgias [Joint Pain] : no joint pain [Joint Stiffness] : no joint stiffness [Joint Swelling] : no joint swelling [Limb Pain] : no limb pain [Limb Swelling] : no limb swelling [Skin Lesions] : no skin lesions [Itching] : no itching [Skin Wound] : no skin wound [Change In A Mole] : no change in a mole [Dry Skin] : no dry skin [Confused] : no confusion [An Unusual Growth] : no unusual growth on the skin [Dizziness] : no dizziness [Convulsions] : no convulsions [Fainting] : no fainting [Limb Weakness] : no limb weakness [Difficulty Walking] : no difficulty walking [Suicidal] : not suicidal [Sleep Disturbances] : no sleep disturbances [Anxiety] : no anxiety [Depression] : no depression [Change In Personality] : no personality change [Emotional Problems] : no emotional problems [Proptosis] : no proptosis [Hot Flashes] : no hot flashes [Muscle Weakness] : no muscle weakness [Erectile Dysfunction] : no erectile dysfunction [Deepening Of The Voice] : no deepening of the voice [Feelings Of Weakness] : no feelings of weakness [Easy Bleeding] : no tendency for easy bleeding [Easy Bruising] : no tendency for easy bruising [Swollen Glands] : no swollen glands [Swollen Glands In The Neck] : no swollen glands in the neck

## 2020-09-16 NOTE — DISCUSSION/SUMMARY
[Patient] : the patient [Risks] : risks [Benefits] : benefits [Alternatives] : alternatives [___ Month(s)] : [unfilled] month(s) [With Me] : with me [FreeTextEntry1] : 69M with prior NSTEMI s/p PCI with YARIEL to pLAD and OM1. Preserved LV function. \par He is optimized to proceed with the planned urologi cprocedure.\par May hold aspirin and plavix for 7-10 for the procedure. \par

## 2020-09-16 NOTE — HISTORY OF PRESENT ILLNESS
[FreeTextEntry1] : Ernesto is returning for a f/u\par  \par He feels well. Planning for a prostate surgery\par No syncope or palpitations\par No falls or blackouts\par Mild BILL\par No LE edema\par \par

## 2020-09-16 NOTE — PHYSICAL EXAM
[General Appearance - Well Developed] : well developed [Normal Appearance] : normal appearance [General Appearance - Well Nourished] : well nourished [General Appearance - In No Acute Distress] : no acute distress [Normal Conjunctiva] : the conjunctiva exhibited no abnormalities [Normal Oral Mucosa] : normal oral mucosa [Normal Jugular Venous V Waves Present] : normal jugular venous V waves present [Normal Jugular Venous A Waves Present] : normal jugular venous A waves present [No Jugular Venous Rojas A Waves] : no jugular venous rojas A waves [Exaggerated Use Of Accessory Muscles For Inspiration] : no accessory muscle use [Respiration, Rhythm And Depth] : normal respiratory rhythm and effort [Auscultation Breath Sounds / Voice Sounds] : lungs were clear to auscultation bilaterally [Heart Rate And Rhythm] : heart rate and rhythm were normal [Heart Sounds] : normal S1 and S2 [Murmurs] : no murmurs present [Abdomen Soft] : soft [Edema] : no peripheral edema present [Abnormal Walk] : normal gait [Nail Clubbing] : no clubbing of the fingernails [Cyanosis, Localized] : no localized cyanosis [Petechial Hemorrhages (___cm)] : no petechial hemorrhages [] : no ischemic changes [Skin Color & Pigmentation] : normal skin color and pigmentation [Oriented To Time, Place, And Person] : oriented to person, place, and time [Affect] : the affect was normal

## 2020-10-20 ENCOUNTER — APPOINTMENT (OUTPATIENT)
Dept: VASCULAR SURGERY | Facility: CLINIC | Age: 70
End: 2020-10-20
Payer: COMMERCIAL

## 2020-10-20 VITALS — BODY MASS INDEX: 29.12 KG/M2 | WEIGHT: 175 LBS

## 2020-10-20 VITALS
TEMPERATURE: 98 F | SYSTOLIC BLOOD PRESSURE: 116 MMHG | HEART RATE: 65 BPM | HEIGHT: 65 IN | BODY MASS INDEX: 28.32 KG/M2 | WEIGHT: 170 LBS | DIASTOLIC BLOOD PRESSURE: 71 MMHG

## 2020-10-20 DIAGNOSIS — M79.662 PAIN IN LEFT LOWER LEG: ICD-10-CM

## 2020-10-20 DIAGNOSIS — I83.819 VARICOSE VEINS OF UNSPECIFIED LOWER EXTREMITY WITH PAIN: ICD-10-CM

## 2020-10-20 PROCEDURE — 99214 OFFICE O/P EST MOD 30 MIN: CPT

## 2020-10-20 PROCEDURE — 93971 EXTREMITY STUDY: CPT | Mod: LT

## 2020-10-20 PROCEDURE — 99072 ADDL SUPL MATRL&STAF TM PHE: CPT

## 2020-10-20 NOTE — PHYSICAL EXAM
[Normal Breath Sounds] : Normal breath sounds [Right Carotid Bruit] : right carotid bruit heard [2+] : right 2+ [Left Carotid Bruit] : left carotid bruit heard [1+] : left 1+ [Ankle Swelling (On Exam)] : present [Ankle Swelling Bilaterally] : bilaterally  [Ankle Swelling On The Left] : moderate [Varicose Veins Of Lower Extremities] : bilaterally [Ankle Swelling On The Right] : mild [] : bilaterally [No HSM] : no hepatosplenomegaly [Alert] : alert [No Rash or Lesion] : No rash or lesion [Oriented to Person] : oriented to person [Oriented to Place] : oriented to place [Calm] : calm [Oriented to Time] : oriented to time [Abdomen Masses] : No abdominal masses [JVD] : no jugular venous distention  [de-identified] : nad  [Stool Sample Taken] : No stool obtained  on rectal exam [Tender] : was nontender [de-identified] : wnl [FreeTextEntry1] : Mild arterial insufficiency w mild to  moderate trophic skin changes \par no wounds/ulcers\par Mild moderate severe bilateral leg venous insufficiency \par w mild to moderate bilateral leg stasis dermatitis, hyperpigmentation in the gator area, hyperkeratosis (skin thickening)\par and moderate bilateral leg edema \par Multiple  bilateral  leg small and medium  varicose  veins and spider veins  ant medial thigh and calf and shin \par RLE Varicose veins measuring  2-3 mm in size on the calf /shin \par LLE Varicose veins measuring  2-3 mm in size on the calf /shin \par no wounds/ulcers\par mod left calf discomfort w exam  [de-identified] : wnl  [de-identified] : Leo Cranial nerves 2-12 leo grossly intact [de-identified] : cooperative [de-identified] : wnl

## 2020-10-20 NOTE — DATA REVIEWED
[FreeTextEntry1] : 2/26/2020 LEESA/PVR RLE \par                                  Leo Lower Extremities with no significant arterial occlusive disease  \par                                   and w vessel calcification\par                                  Rt LEESA  1.20 Lt LEESA  1.24\par                                 \par \par 2/26/2020 Venous Doppler Leo LE  no acute dvt svt \par                            RLE sig for thigh and calf insuff collaterals\par                            LLE  LSV insuff from spj to distal calf level w insuff knee and calf  collaterals\par \par 10/20/20 LLE Venous Duplex LSV ablated no acute dvt , svt\par \par

## 2020-10-20 NOTE — HISTORY OF PRESENT ILLNESS
[FreeTextEntry1] : Pt c/o  bilateral leg pain swelling heaviness and discomfort worse w activity and by the end of the day\par  1 year ago \par Intensity moderate \par Pt denies recent injury, travel or thrombophilic event\par \par Pt c/o  bilateral  leg  intermittent claudication at  blocks and nightly , occasional griselda leg and foot nocturnal cramps 1x/week \par pt states that it is affecting his sleep\par Onset 10 mo ago \par Intensity mild \par \par Pt denies any recent  cardiac c/o , SOB, Chest Pain or recent heart attacks \par pt has coronary stents \par pt is employed daily from 11 pm to 6am\par pt states  le  nocturnal leg cramps are the worst \par \par \par \par  [de-identified] : pt is compliant w comp stockings\par pt c/o ongoing le pain swelling heaviness and discomfort \par intensity mild now  since last ov\par pt states new onset of mod left calf pain after exercise 3-4 days ago which has not improved\par no trauma \par pt states no le art insuff c/o at this time

## 2020-10-20 NOTE — ASSESSMENT
[Arterial/Venous Disease] : arterial/venous disease [Medication Management] : medication management [Other: _____] : [unfilled] [Foot care/Footwear] : foot care/footwear [FreeTextEntry1] : Impression asymptomatic arterial insuff , symptomatic venous insuff \par \par \par Plan Med Conservative management leg elevation, knee high compression stockings 15-20mm Hg, wt loss, diet control, exercise program, protective measures\par continue  pletal  50 bid \par carotid duplex s/o stenosis 6mo march 2021 then ov w clarence/pvr s/o art insuff 12mo march 2021 \par \par \par \par Varicose veins are enlarged, twisted veins. Varicose veins are caused by increased blood pressure in the veins.  The blood moves towards the heart by 1-way valves in the veins. When the valves become weakened or damaged, blood can collect in the veins and pool in your lower legs (ankles). This causes the veins to become enlarged and incompetent with reflux. Sitting or standing for long periods can cause blood to pool in the leg veins, increasing the pressure within the veins. \par Risk factors for varicose veins or venous disease may include:  obesity, older age, standing or sitting for prolonged periods of time for several years, being female, pregnancy, taking oral contraceptive pills or hormone replacement, being inactive, and/or smoking. \par The most common symptoms of varicose veins are sensations in the legs, such as a heavy feeling, burning, and/or aching. However, each individual may experience symptoms differently.  Other symptoms may include:  color changes in the skin, sores on the legs, or rash.  Severe varicose veins or venous disease may eventually produce long-term mild swelling that can result in more serious skin and tissue problems, such as ulcers and non-healing sores.\par Varicose veins and venous disease are diagnosed by a complete medical history, physical examination, and diagnostic studies for varicose veins including duplex ultrasound and color-flow imaging.  \par Medical treatment for varicose veins and venous disease include:  compression stockings, sclerotherapy, endovenous ablation and/or surgical treatment with microphlebectomy.  \par \par

## 2020-11-10 ENCOUNTER — APPOINTMENT (OUTPATIENT)
Dept: INTERNAL MEDICINE | Facility: CLINIC | Age: 70
End: 2020-11-10

## 2020-11-12 ENCOUNTER — APPOINTMENT (OUTPATIENT)
Dept: INTERNAL MEDICINE | Facility: CLINIC | Age: 70
End: 2020-11-12
Payer: COMMERCIAL

## 2020-11-12 VITALS
HEART RATE: 73 BPM | OXYGEN SATURATION: 96 % | DIASTOLIC BLOOD PRESSURE: 62 MMHG | SYSTOLIC BLOOD PRESSURE: 108 MMHG | WEIGHT: 174 LBS | HEIGHT: 65.5 IN | TEMPERATURE: 97.16 F | BODY MASS INDEX: 28.64 KG/M2

## 2020-11-12 DIAGNOSIS — Z23 ENCOUNTER FOR IMMUNIZATION: ICD-10-CM

## 2020-11-12 DIAGNOSIS — G44.209 TENSION-TYPE HEADACHE, UNSPECIFIED, NOT INTRACTABLE: ICD-10-CM

## 2020-11-12 DIAGNOSIS — Z87.898 PERSONAL HISTORY OF OTHER SPECIFIED CONDITIONS: ICD-10-CM

## 2020-11-12 DIAGNOSIS — I82.812 EMBOLISM AND THROMBOSIS OF SUPERFICIAL VEINS OF LEFT LOWER EXTREMITIES: ICD-10-CM

## 2020-11-12 DIAGNOSIS — Z87.09 PERSONAL HISTORY OF OTHER DISEASES OF THE RESPIRATORY SYSTEM: ICD-10-CM

## 2020-11-12 DIAGNOSIS — Z86.79 PERSONAL HISTORY OF OTHER DISEASES OF THE CIRCULATORY SYSTEM: ICD-10-CM

## 2020-11-12 DIAGNOSIS — Z13.820 ENCOUNTER FOR SCREENING FOR OSTEOPOROSIS: ICD-10-CM

## 2020-11-12 PROCEDURE — G0444 DEPRESSION SCREEN ANNUAL: CPT

## 2020-11-12 PROCEDURE — 36415 COLL VENOUS BLD VENIPUNCTURE: CPT

## 2020-11-12 PROCEDURE — 90732 PPSV23 VACC 2 YRS+ SUBQ/IM: CPT

## 2020-11-12 PROCEDURE — G0009: CPT

## 2020-11-12 PROCEDURE — 90662 IIV NO PRSV INCREASED AG IM: CPT

## 2020-11-12 PROCEDURE — 90472 IMMUNIZATION ADMIN EACH ADD: CPT

## 2020-11-12 PROCEDURE — 99387 INIT PM E/M NEW PAT 65+ YRS: CPT | Mod: 25

## 2020-11-12 PROCEDURE — 99072 ADDL SUPL MATRL&STAF TM PHE: CPT

## 2020-11-12 RX ORDER — SODIUM BICARBONATE 84 MG/ML
8.4 INJECTION, SOLUTION INTRAVENOUS
Qty: 5 | Refills: 0 | Status: DISCONTINUED | COMMUNITY
Start: 2020-08-11 | End: 2020-11-12

## 2020-11-12 RX ORDER — LIDOCAINE HYDROCHLORIDE 10 MG/ML
1 INJECTION, SOLUTION INFILTRATION; PERINEURAL
Qty: 1 | Refills: 0 | Status: DISCONTINUED | COMMUNITY
Start: 2020-08-11 | End: 2020-11-12

## 2020-11-12 RX ORDER — PANTOPRAZOLE 20 MG/1
20 TABLET, DELAYED RELEASE ORAL
Refills: 0 | Status: DISCONTINUED | COMMUNITY
End: 2020-11-12

## 2020-11-12 RX ORDER — OXYBUTYNIN CHLORIDE 5 MG/1
5 TABLET ORAL
Qty: 6 | Refills: 0 | Status: DISCONTINUED | COMMUNITY
Start: 2020-11-04

## 2020-11-12 RX ORDER — SODIUM CHLORIDE 9 G/ML
0.9 INJECTION, SOLUTION INTRAVENOUS
Qty: 500 | Refills: 0 | Status: DISCONTINUED | COMMUNITY
Start: 2020-08-11 | End: 2020-11-12

## 2020-11-12 NOTE — ASSESSMENT
[FreeTextEntry1] : #HCM\par Encouraged healthy diet and exercise given endorses fatty liver. Recent colonoscopy this year, patient to send over records. Flu and Pneumovax today.  Labs ordered as below. Encouraged seeing dermatologist for skin cancer screening. GI referral provided. Patient is seeing cardiologist soon. Urologist records to be faxed over. HCP form provided in this visit.  \par \par Pending medical records to be faxed over to our office.\par  \par Patient to return for shingrix vaccine in 1 month.

## 2020-11-12 NOTE — HEALTH RISK ASSESSMENT
[No falls in past year] : Patient reported no falls in the past year [0] : 2) Feeling down, depressed, or hopeless: Not at all (0) [No] : In the past 12 months have you used drugs other than those required for medical reasons? No [Patient reported colonoscopy was normal] : Patient reported colonoscopy was normal [None] : None [With Family] : lives with family [Unemployed] : unemployed [] :  [Feels Safe at Home] : Feels safe at home [Fully functional (bathing, dressing, toileting, transferring, walking, feeding)] : Fully functional (bathing, dressing, toileting, transferring, walking, feeding) [Fully functional (using the telephone, shopping, preparing meals, housekeeping, doing laundry, using] : Fully functional and needs no help or supervision to perform IADLs (using the telephone, shopping, preparing meals, housekeeping, doing laundry, using transportation, managing medications and managing finances) [Smoke Detector] : smoke detector [Carbon Monoxide Detector] : carbon monoxide detector [Seat Belt] :  uses seat belt [Sunscreen] : uses sunscreen [Good] : ~his/her~  mood as  good [] : No [de-identified] : ED [de-identified] : Urologist  [de-identified] : Walking [de-identified] : regular  [[id number]] : 0 [Reports changes in hearing] : Reports no changes in hearing [Reports changes in vision] : Reports no changes in vision [Reports normal functional visual acuity (ie: able to read med bottle)] : Reports poor functional visual acuity.  [ColonoscopyDate] : 6/20

## 2020-11-12 NOTE — PHYSICAL EXAM
[No Acute Distress] : no acute distress [Well Nourished] : well nourished [Well Developed] : well developed [Normal Sclera/Conjunctiva] : normal sclera/conjunctiva [EOMI] : extraocular movements intact [Normal Outer Ear/Nose] : the outer ears and nose were normal in appearance [Normal Oropharynx] : the oropharynx was normal [No JVD] : no jugular venous distention [No Lymphadenopathy] : no lymphadenopathy [Supple] : supple [Thyroid Normal, No Nodules] : the thyroid was normal and there were no nodules present [No Respiratory Distress] : no respiratory distress  [No Accessory Muscle Use] : no accessory muscle use [Clear to Auscultation] : lungs were clear to auscultation bilaterally [Normal Rate] : normal rate  [Regular Rhythm] : with a regular rhythm [Normal S1, S2] : normal S1 and S2 [No Murmur] : no murmur heard [Pedal Pulses Present] : the pedal pulses are present [No Edema] : there was no peripheral edema [No Extremity Clubbing/Cyanosis] : no extremity clubbing/cyanosis [Soft] : abdomen soft [Non Tender] : non-tender [Non-distended] : non-distended [Normal Bowel Sounds] : normal bowel sounds [Normal Supraclavicular Nodes] : no supraclavicular lymphadenopathy [Normal Posterior Cervical Nodes] : no posterior cervical lymphadenopathy [Normal Anterior Cervical Nodes] : no anterior cervical lymphadenopathy [No CVA Tenderness] : no CVA  tenderness [No Spinal Tenderness] : no spinal tenderness [No Joint Swelling] : no joint swelling [Grossly Normal Strength/Tone] : grossly normal strength/tone [No Rash] : no rash [Coordination Grossly Intact] : coordination grossly intact [No Focal Deficits] : no focal deficits [Normal Gait] : normal gait [Normal Affect] : the affect was normal [Normal Insight/Judgement] : insight and judgment were intact [Comprehensive Foot Exam Normal] : Right and left foot were examined and both feet are normal. No ulcers in either foot. Toes are normal and with full ROM.  Normal tactile sensation with monofilament testing throughout both feet

## 2020-11-12 NOTE — HISTORY OF PRESENT ILLNESS
[FreeTextEntry1] : Establish care  [de-identified] : Mr. DEBBIE ANDRADE is a 70 year old man with pmhx of CAD w/ stents, hepatic steatosis, Prediabetes, bph recent rezum, recent admission at [facility] for bladder hematoma with resolution / left saphenous vein thrombosis who presents to establish care. He saw urologist yesterday, with no changes in management as per patient. Records will be faxed over. He endorses headache in the past with cilostazol and self discontinued them. Headaches has resolved since. He endorses long standing abdominal bloating and acid reflux, sour tast in mouth. He occasionally uses omeprazole. He wants a gastroenterologist referral for endoscopy.  He endorses being in good health, exercising regular. Denied chest pain, shortness of breath or lightheadedness with physical activity.

## 2020-11-13 LAB
25(OH)D3 SERPL-MCNC: 22.4 NG/ML
ALBUMIN SERPL ELPH-MCNC: 4.4 G/DL
ALP BLD-CCNC: 96 U/L
ALT SERPL-CCNC: 55 U/L
ANION GAP SERPL CALC-SCNC: 14 MMOL/L
APPEARANCE: ABNORMAL
AST SERPL-CCNC: 25 U/L
BACTERIA: NEGATIVE
BASOPHILS # BLD AUTO: 0.06 K/UL
BASOPHILS NFR BLD AUTO: 0.8 %
BILIRUB SERPL-MCNC: 0.4 MG/DL
BILIRUBIN URINE: NEGATIVE
BLOOD URINE: NEGATIVE
BUN SERPL-MCNC: 19 MG/DL
CALCIUM SERPL-MCNC: 9.4 MG/DL
CHLORIDE SERPL-SCNC: 103 MMOL/L
CHOLEST SERPL-MCNC: 95 MG/DL
CO2 SERPL-SCNC: 22 MMOL/L
COLOR: YELLOW
CREAT SERPL-MCNC: 1.03 MG/DL
CREAT SPEC-SCNC: 201 MG/DL
EOSINOPHIL # BLD AUTO: 0.44 K/UL
EOSINOPHIL NFR BLD AUTO: 6.1 %
ESTIMATED AVERAGE GLUCOSE: 120 MG/DL
GLUCOSE QUALITATIVE U: NEGATIVE
GLUCOSE SERPL-MCNC: 87 MG/DL
HBA1C MFR BLD HPLC: 5.8 %
HCT VFR BLD CALC: 35.3 %
HDLC SERPL-MCNC: 20 MG/DL
HGB BLD-MCNC: 11 G/DL
HYALINE CASTS: 0 /LPF
IMM GRANULOCYTES NFR BLD AUTO: 0.3 %
KETONES URINE: NEGATIVE
LDLC SERPL CALC-MCNC: 33 MG/DL
LEUKOCYTE ESTERASE URINE: ABNORMAL
LYMPHOCYTES # BLD AUTO: 2.2 K/UL
LYMPHOCYTES NFR BLD AUTO: 30.3 %
MAN DIFF?: NORMAL
MCHC RBC-ENTMCNC: 29.3 PG
MCHC RBC-ENTMCNC: 31.2 GM/DL
MCV RBC AUTO: 94.1 FL
MICROALBUMIN 24H UR DL<=1MG/L-MCNC: 3.9 MG/DL
MICROALBUMIN/CREAT 24H UR-RTO: 19 MG/G
MICROSCOPIC-UA: NORMAL
MONOCYTES # BLD AUTO: 0.8 K/UL
MONOCYTES NFR BLD AUTO: 11 %
NEUTROPHILS # BLD AUTO: 3.74 K/UL
NEUTROPHILS NFR BLD AUTO: 51.5 %
NITRITE URINE: NEGATIVE
NONHDLC SERPL-MCNC: 75 MG/DL
PH URINE: 6.5
PLATELET # BLD AUTO: 335 K/UL
POTASSIUM SERPL-SCNC: 4.4 MMOL/L
PROT SERPL-MCNC: 7 G/DL
PROTEIN URINE: ABNORMAL
RBC # BLD: 3.75 M/UL
RBC # FLD: 13.1 %
RED BLOOD CELLS URINE: 4 /HPF
SARS-COV-2 IGG SERPL IA-ACNC: 0.08 INDEX
SARS-COV-2 IGG SERPL QL IA: NEGATIVE
SODIUM SERPL-SCNC: 139 MMOL/L
SPECIFIC GRAVITY URINE: 1.02
SQUAMOUS EPITHELIAL CELLS: 1 /HPF
TRIGL SERPL-MCNC: 214 MG/DL
TSH SERPL-ACNC: 1.42 UIU/ML
URATE SERPL-MCNC: 3.5 MG/DL
URINE COMMENTS: NORMAL
UROBILINOGEN URINE: NORMAL
VIT B12 SERPL-MCNC: 697 PG/ML
WBC # FLD AUTO: 7.26 K/UL
WHITE BLOOD CELLS URINE: 132 /HPF

## 2020-11-18 ENCOUNTER — TRANSCRIPTION ENCOUNTER (OUTPATIENT)
Age: 70
End: 2020-11-18

## 2020-11-18 DIAGNOSIS — M19.042 PRIMARY OSTEOARTHRITIS, RIGHT HAND: ICD-10-CM

## 2020-11-18 DIAGNOSIS — M19.041 PRIMARY OSTEOARTHRITIS, RIGHT HAND: ICD-10-CM

## 2020-12-23 ENCOUNTER — APPOINTMENT (OUTPATIENT)
Dept: INTERNAL MEDICINE | Facility: CLINIC | Age: 70
End: 2020-12-23
Payer: COMMERCIAL

## 2020-12-23 VITALS
SYSTOLIC BLOOD PRESSURE: 118 MMHG | HEART RATE: 68 BPM | WEIGHT: 175 LBS | BODY MASS INDEX: 28.81 KG/M2 | OXYGEN SATURATION: 97 % | HEIGHT: 65.5 IN | DIASTOLIC BLOOD PRESSURE: 70 MMHG

## 2020-12-23 DIAGNOSIS — J03.90 ACUTE TONSILLITIS, UNSPECIFIED: ICD-10-CM

## 2020-12-23 PROCEDURE — 99214 OFFICE O/P EST MOD 30 MIN: CPT

## 2020-12-23 PROCEDURE — 99072 ADDL SUPL MATRL&STAF TM PHE: CPT

## 2020-12-23 NOTE — REVIEW OF SYSTEMS
[Fever] : no fever [Chills] : no chills [Fatigue] : no fatigue [Earache] : earache [Sore Throat] : sore throat [Chest Pain] : no chest pain [Palpitations] : no palpitations [Lower Ext Edema] : no lower extremity edema [Shortness Of Breath] : no shortness of breath [Wheezing] : no wheezing [Cough] : no cough [Dyspnea on Exertion] : not dyspnea on exertion [Abdominal Pain] : no abdominal pain [Nausea] : no nausea [Vomiting] : no vomiting

## 2020-12-23 NOTE — PHYSICAL EXAM
[No Acute Distress] : no acute distress [Well Nourished] : well nourished [Normal Sclera/Conjunctiva] : normal sclera/conjunctiva [Normal Outer Ear/Nose] : the outer ears and nose were normal in appearance [No Respiratory Distress] : no respiratory distress  [Normal Rate] : normal rate  [Regular Rhythm] : with a regular rhythm [Normal S1, S2] : normal S1 and S2 [No Murmur] : no murmur heard [Pedal Pulses Present] : the pedal pulses are present [No Edema] : there was no peripheral edema [No Extremity Clubbing/Cyanosis] : no extremity clubbing/cyanosis [de-identified] : Decreased breath sounds bilaterally.

## 2020-12-23 NOTE — HISTORY OF PRESENT ILLNESS
[FreeTextEntry8] : Mr. DEBBIE ANDRADE is a 70 year old man with pmhx of CAD w/ stents, hepatic steatosis, Prediabetes, bph recent rezum, recent admission at [facility] for bladder hematoma with resolution / left saphenous vein thrombosis who presents for an acute visit. \par \par He endorses left sided throat pain and swelling for the past two days. Pain is mild, radiates to the left ear. He endorses mild submandibular tenderness. He denied fever, chills, bodyaches, n/v/d, diarrhea, bodyaches or any lost of taste or smell. He also complains of mild bloating when eating soups, and beans.

## 2021-01-01 PROBLEM — Z13.820 OSTEOPOROSIS SCREENING: Status: ACTIVE | Noted: 2021-01-01

## 2021-03-26 ENCOUNTER — APPOINTMENT (OUTPATIENT)
Dept: INTERNAL MEDICINE | Facility: CLINIC | Age: 71
End: 2021-03-26
Payer: MEDICARE

## 2021-03-26 VITALS
OXYGEN SATURATION: 96 % | TEMPERATURE: 97.9 F | DIASTOLIC BLOOD PRESSURE: 68 MMHG | HEIGHT: 65.5 IN | WEIGHT: 179 LBS | SYSTOLIC BLOOD PRESSURE: 112 MMHG | HEART RATE: 78 BPM | BODY MASS INDEX: 29.46 KG/M2

## 2021-03-26 DIAGNOSIS — Z01.818 ENCOUNTER FOR OTHER PREPROCEDURAL EXAMINATION: ICD-10-CM

## 2021-03-26 PROCEDURE — 99214 OFFICE O/P EST MOD 30 MIN: CPT

## 2021-03-26 RX ORDER — AMOXICILLIN AND CLAVULANATE POTASSIUM 875; 125 MG/1; MG/1
875-125 TABLET, COATED ORAL
Qty: 20 | Refills: 0 | Status: DISCONTINUED | COMMUNITY
Start: 2020-12-23 | End: 2021-03-26

## 2021-03-26 RX ORDER — PHENOL 1.4 %
1.4 AEROSOL, SPRAY (ML) MUCOUS MEMBRANE
Qty: 1 | Refills: 0 | Status: DISCONTINUED | COMMUNITY
Start: 2020-12-23 | End: 2021-03-26

## 2021-03-26 RX ORDER — ATORVASTATIN CALCIUM 40 MG/1
40 TABLET, FILM COATED ORAL
Refills: 0 | Status: DISCONTINUED | COMMUNITY
End: 2021-03-26

## 2021-03-26 RX ORDER — CILOSTAZOL 50 MG/1
50 TABLET ORAL
Qty: 60 | Refills: 6 | Status: DISCONTINUED | COMMUNITY
Start: 2020-02-26 | End: 2021-03-26

## 2021-03-26 NOTE — PHYSICAL EXAM
[No Acute Distress] : no acute distress [Well Nourished] : well nourished [Well Developed] : well developed [Normal Sclera/Conjunctiva] : normal sclera/conjunctiva [EOMI] : extraocular movements intact [Normal Outer Ear/Nose] : the outer ears and nose were normal in appearance [Supple] : supple [No Respiratory Distress] : no respiratory distress  [Clear to Auscultation] : lungs were clear to auscultation bilaterally [Normal Rate] : normal rate  [Regular Rhythm] : with a regular rhythm [Normal S1, S2] : normal S1 and S2 [No Murmur] : no murmur heard [Pedal Pulses Present] : the pedal pulses are present [No Edema] : there was no peripheral edema [No Extremity Clubbing/Cyanosis] : no extremity clubbing/cyanosis [Soft] : abdomen soft [Non Tender] : non-tender [Non-distended] : non-distended [Normal Bowel Sounds] : normal bowel sounds [No Spinal Tenderness] : no spinal tenderness [Grossly Normal Strength/Tone] : grossly normal strength/tone [Coordination Grossly Intact] : coordination grossly intact [No Focal Deficits] : no focal deficits [Normal Gait] : normal gait [Normal Affect] : the affect was normal [Normal Insight/Judgement] : insight and judgment were intact [Comprehensive Foot Exam Normal] : Right and left foot were examined and both feet are normal. No ulcers in either foot. Toes are normal and with full ROM.  Normal tactile sensation with monofilament testing throughout both feet

## 2021-03-30 NOTE — REVIEW OF SYSTEMS
[Dysuria] : dysuria [Abdominal Pain] : abdominal pain [Negative] : Musculoskeletal [Nausea] : no nausea [Vomiting] : no vomiting [Hematuria] : no hematuria

## 2021-03-30 NOTE — ASSESSMENT
[Patient Requires Further Testing] : Patient requires further testing [Cardiology consultation] : Cardiology consultation [Continue anticoagulant treatment as is] : Continue current anticoagulant treatment [Continue anti-platelet treatment as is] : Continue current anti-platelet treatment [Continue medications as is] : Continue current medications [As per surgery] : as per surgery [FreeTextEntry4] : Given hx of CAD and PAD he is at elevated risk for a low risk procedure. Patient advised to continue all medications ( DAPT and bb) on the day of procedure. Initiating omega 3 fatty acid for elevated triglycerides. Patient has been off atorvastatin and cilostazol. Labs and ekg to be faxed over to our office. He seeing cardiologist soon.

## 2021-03-30 NOTE — HISTORY OF PRESENT ILLNESS
[Coronary Artery Disease] : coronary artery disease [Sleep Apnea] : sleep apnea [(Patient denies any chest pain, claudication, dyspnea on exertion, orthopnea, palpitations or syncope)] : Patient denies any chest pain, claudication, dyspnea on exertion, orthopnea, palpitations or syncope [No Adverse Anesthesia Reaction] : no adverse anesthesia reaction in self or family member [Moderate (4-6 METs)] : Moderate (4-6 METs) [Anti-Platelet Agents: _____] : Anti-Platelet Agents: [unfilled] [NSAIDs: _____] : NSAIDs: [unfilled] [Aortic Stenosis] : no aortic stenosis [Atrial Fibrillation] : no atrial fibrillation [Recent Myocardial Infarction] : no recent myocardial infarction [Implantable Device/Pacemaker] : no implantable device/pacemaker [Asthma] : no asthma [COPD] : no COPD [Smoker] : not a smoker [Family Member] : no family member with adverse anesthesia reaction/sudden death [Self] : no previous adverse anesthesia reaction [Chronic Anticoagulation] : no chronic anticoagulation [Chronic Kidney Disease] : no chronic kidney disease [Diabetes] : no diabetes [FreeTextEntry1] : [facility] [FreeTextEntry2] : 04/05/2021 [FreeTextEntry3] : Dr. Deejay Alejandro  [FreeTextEntry4] : Mr. DEBBIE ANDRADE is a 70 year old man with pmhx of CAD w/ 3 stents, ELLIOTT on cpap, prediabetes, hepatic steatosis, Prediabetes, bph recent rezum, hx of  bladder hematoma with resolution / left saphenous vein thrombosis, recently diagnosed with urethtral stricture on cystoscopy who presents medical clearance. He endorses being in good health and good mood. He complaining trouble urinating, pain with urination. Recent urine culture, NGTD. He has had surgery in the past. No adverse reaction to anesthesia. Surgical complications of bladder hematoma and saphenosus vein thrombosis. He has improved diet and exercise. He endorses being in good health. He denied any chest pain, dyspnea on exertion. He endorses upset stomach, has not been able to see GI. He also complains of occasional left foot pain with activity, he has not been taking cilastozol for months. \par \par He had both pfizer covid vaccine, but cannot recall the date.

## 2021-03-30 NOTE — PLAN
[FreeTextEntry1] : Addendum 3/30/2021. Patient had EKG Sinus Rhythm HR 58, Normal Intervals, No ST abnormalities. BMP wnl, except for chloride of 108. Normal wbc, hgb, platelet. PTT slightly prolonged to 20.6, otherwise normal coags. U/a wnl. \par \par Patient is optimized for surgery.

## 2021-04-01 ENCOUNTER — APPOINTMENT (OUTPATIENT)
Dept: CARDIOLOGY | Facility: CLINIC | Age: 71
End: 2021-04-01
Payer: MEDICARE

## 2021-04-01 ENCOUNTER — NON-APPOINTMENT (OUTPATIENT)
Age: 71
End: 2021-04-01

## 2021-04-01 VITALS
BODY MASS INDEX: 29.46 KG/M2 | WEIGHT: 179 LBS | HEIGHT: 65.5 IN | DIASTOLIC BLOOD PRESSURE: 69 MMHG | HEART RATE: 66 BPM | SYSTOLIC BLOOD PRESSURE: 112 MMHG | OXYGEN SATURATION: 97 %

## 2021-04-01 PROCEDURE — 99213 OFFICE O/P EST LOW 20 MIN: CPT

## 2021-04-01 PROCEDURE — 93000 ELECTROCARDIOGRAM COMPLETE: CPT

## 2021-04-01 NOTE — PHYSICAL EXAM
[General Appearance - Well Developed] : well developed [Normal Appearance] : normal appearance [General Appearance - Well Nourished] : well nourished [General Appearance - In No Acute Distress] : no acute distress [Normal Conjunctiva] : the conjunctiva exhibited no abnormalities [Normal Oral Mucosa] : normal oral mucosa [Normal Jugular Venous A Waves Present] : normal jugular venous A waves present [Normal Jugular Venous V Waves Present] : normal jugular venous V waves present [No Jugular Venous Rojas A Waves] : no jugular venous rojas A waves [Respiration, Rhythm And Depth] : normal respiratory rhythm and effort [Exaggerated Use Of Accessory Muscles For Inspiration] : no accessory muscle use [Auscultation Breath Sounds / Voice Sounds] : lungs were clear to auscultation bilaterally [Heart Rate And Rhythm] : heart rate and rhythm were normal [Heart Sounds] : normal S1 and S2 [Murmurs] : no murmurs present [Edema] : no peripheral edema present [Abdomen Soft] : soft [Abnormal Walk] : normal gait [Nail Clubbing] : no clubbing of the fingernails [Cyanosis, Localized] : no localized cyanosis [Petechial Hemorrhages (___cm)] : no petechial hemorrhages [] : no ischemic changes [Skin Color & Pigmentation] : normal skin color and pigmentation [Oriented To Time, Place, And Person] : oriented to person, place, and time [Affect] : the affect was normal

## 2021-04-04 NOTE — REVIEW OF SYSTEMS
[Feeling Fatigued] : feeling fatigued [see HPI] : see HPI [Negative] : Neurological [Dyspnea on exertion] : not dyspnea during exertion [Fever] : no fever [Chills] : no chills [Feeling Tired] : not feeling tired [Feeling Poorly] : not feeling poorly [Recent Weight Gain (___ Lbs)] : no recent weight gain [Recent Weight Loss (___ Lbs)] : no recent weight loss [Eye Pain] : no eye pain [Red Eyes] : eyes not red [Eyesight Problems] : no eyesight problems [Discharge From Eyes] : no purulent discharge from the eyes [Dry Eyes] : no dryness of the eyes [Eyes Itch] : no itching of the eyes [Earache] : no earache [Loss Of Hearing] : no hearing loss [Nosebleeds] : no nosebleeds [Nasal Discharge] : no nasal discharge [Sore Throat] : no sore throat [Hoarseness] : no hoarseness [Heart Rate Is Slow] : the heart rate was not slow [Heart Rate Is Fast] : the heart rate was not fast [Palpitations] : no palpitations [Chest Pain] : no chest pain [Leg Claudication] : no intermittent leg claudication [Lower Ext Edema] : no extremity edema [Shortness Of Breath] : no shortness of breath [Cough] : no cough [Orthopnea] : no orthopnea [Wheezing] : no wheezing [SOB on Exertion] : no shortness of breath during exertion [PND] : no PND [Abdominal Pain] : no abdominal pain [Vomiting] : no vomiting [Constipation] : no constipation [Diarrhea] : no diarrhea [Heartburn] : no heartburn [Melena] : no melena [Dysuria] : no dysuria [Incontinence] : no incontinence [Hesitancy] : no urinary hesitancy [Nocturia] : no nocturia [Genital Lesion] : no genital lesions [Testicular Pain] : no testicular pain [Arthralgias] : no arthralgias [Joint Pain] : no joint pain [Joint Swelling] : no joint swelling [Joint Stiffness] : no joint stiffness [Limb Pain] : no limb pain [Limb Swelling] : no limb swelling [Skin Lesions] : no skin lesions [Skin Wound] : no skin wound [Itching] : no itching [Change In A Mole] : no change in a mole [Dry Skin] : no dry skin [An Unusual Growth] : no unusual growth on the skin [Confused] : no confusion [Convulsions] : no convulsions [Dizziness] : no dizziness [Fainting] : no fainting [Limb Weakness] : no limb weakness [Difficulty Walking] : no difficulty walking [Suicidal] : not suicidal [Sleep Disturbances] : no sleep disturbances [Anxiety] : no anxiety [Depression] : no depression [Change In Personality] : no personality change [Emotional Problems] : no emotional problems [Proptosis] : no proptosis [Hot Flashes] : no hot flashes [Muscle Weakness] : no muscle weakness [Erectile Dysfunction] : no erectile dysfunction [Deepening Of The Voice] : no deepening of the voice [Feelings Of Weakness] : no feelings of weakness [Easy Bleeding] : no tendency for easy bleeding [Easy Bruising] : no tendency for easy bruising [Swollen Glands] : no swollen glands [Swollen Glands In The Neck] : no swollen glands in the neck

## 2021-04-04 NOTE — HISTORY OF PRESENT ILLNESS
[FreeTextEntry1] : Ernesto is returning for a f/u\par  \par He feels well. Planning for a urologic procedure (urethral stricture)\par No syncope or palpitations\par No falls or blackouts\par Mild BILL\par No CP\par No LE edema\par \par

## 2021-04-04 NOTE — DISCUSSION/SUMMARY
[Patient] : the patient [Risks] : risks [Benefits] : benefits [Alternatives] : alternatives [___ Month(s)] : [unfilled] month(s) [With Me] : with me [FreeTextEntry1] : 69M with prior NSTEMI s/p PCI with YARIEL to pLAD and OM1. Preserved LV function. \par He is optimized to proceed with the planned urologic procedure.\par \par May hold aspirin and plavix for 7-10 for the procedure. \par

## 2021-04-09 ENCOUNTER — APPOINTMENT (OUTPATIENT)
Dept: INTERNAL MEDICINE | Facility: CLINIC | Age: 71
End: 2021-04-09
Payer: MEDICARE

## 2021-04-09 VITALS
HEART RATE: 86 BPM | SYSTOLIC BLOOD PRESSURE: 122 MMHG | OXYGEN SATURATION: 96 % | DIASTOLIC BLOOD PRESSURE: 60 MMHG | BODY MASS INDEX: 30.16 KG/M2 | TEMPERATURE: 97.2 F | WEIGHT: 181 LBS | HEIGHT: 65 IN

## 2021-04-09 PROCEDURE — 99213 OFFICE O/P EST LOW 20 MIN: CPT

## 2021-04-09 NOTE — HISTORY OF PRESENT ILLNESS
[FreeTextEntry1] : Follow up  [de-identified] : Mr. DEBBIE ANDRADE is a 70 year old man with pmhx of CAD w/ 3 stents, ELLIOTT on cpap, prediabetes, hepatic steatosis, Prediabetes, bph recent rezum, hx of bladder hematoma with resolution / left saphenous vein thrombosis, recently diagnosed with urethtral stricture s/p surgery who presents for follow up.He still has urinary catheter. Urine is clear. He started aspirin and plavix post surgery. He denied any chest pain, shortness of breath, heart racing, cough. He endorses need medication renewals on all prescription. He has been taking cilostazol. He complains of stomach upset, he has not done h pylori testing or scheduled gi appointment.

## 2021-04-09 NOTE — PHYSICAL EXAM
[No Acute Distress] : no acute distress [Well Nourished] : well nourished [Well Developed] : well developed [Normal Sclera/Conjunctiva] : normal sclera/conjunctiva [No Respiratory Distress] : no respiratory distress  [No Accessory Muscle Use] : no accessory muscle use

## 2021-04-15 ENCOUNTER — APPOINTMENT (OUTPATIENT)
Dept: CARDIOLOGY | Facility: CLINIC | Age: 71
End: 2021-04-15
Payer: MEDICARE

## 2021-04-15 ENCOUNTER — NON-APPOINTMENT (OUTPATIENT)
Age: 71
End: 2021-04-15

## 2021-04-15 VITALS
HEIGHT: 65 IN | DIASTOLIC BLOOD PRESSURE: 69 MMHG | SYSTOLIC BLOOD PRESSURE: 112 MMHG | HEART RATE: 77 BPM | OXYGEN SATURATION: 100 %

## 2021-04-15 PROCEDURE — 99213 OFFICE O/P EST LOW 20 MIN: CPT

## 2021-04-15 PROCEDURE — 93000 ELECTROCARDIOGRAM COMPLETE: CPT

## 2021-04-15 RX ORDER — CLOPIDOGREL BISULFATE 75 MG/1
75 TABLET, FILM COATED ORAL
Qty: 90 | Refills: 1 | Status: DISCONTINUED | COMMUNITY
End: 2021-04-15

## 2021-04-15 NOTE — DISCUSSION/SUMMARY
[Patient] : the patient [Risks] : risks [Benefits] : benefits [Alternatives] : alternatives [___ Month(s)] : [unfilled] month(s) [With Me] : with me [FreeTextEntry1] : 69M with prior NSTEMI s/p PCI with YARIEL to pLAD and OM1. Preserved LV function. \par CAD - no angina\par Encouraged more exercise\par May remain on asa monotherapy\par F/u 6 mo

## 2021-04-15 NOTE — HISTORY OF PRESENT ILLNESS
[FreeTextEntry1] : Ernesto is returning for a f/u\par  \par He feels well. \par No syncope or palpitations\par No falls or blackouts\par Mild BILL\par No CP\par No LE edema\par \par

## 2021-04-16 LAB — H PYLORI AG STL QL: NOT DETECTED

## 2021-04-27 ENCOUNTER — APPOINTMENT (OUTPATIENT)
Dept: VASCULAR SURGERY | Facility: CLINIC | Age: 71
End: 2021-04-27

## 2021-05-12 ENCOUNTER — APPOINTMENT (OUTPATIENT)
Dept: GASTROENTEROLOGY | Facility: CLINIC | Age: 71
End: 2021-05-12
Payer: MEDICARE

## 2021-05-12 VITALS
OXYGEN SATURATION: 96 % | RESPIRATION RATE: 16 BRPM | BODY MASS INDEX: 28.99 KG/M2 | TEMPERATURE: 97.6 F | HEART RATE: 69 BPM | WEIGHT: 174 LBS | DIASTOLIC BLOOD PRESSURE: 66 MMHG | SYSTOLIC BLOOD PRESSURE: 116 MMHG | HEIGHT: 65 IN

## 2021-05-12 PROCEDURE — 99203 OFFICE O/P NEW LOW 30 MIN: CPT

## 2021-05-12 NOTE — REVIEW OF SYSTEMS
[Fever] : no fever [Chills] : no chills [Eyesight Problems] : no eyesight problems [Nosebleeds] : no nosebleeds [Hesitancy] : no urinary hesitancy [Arthralgias] : no arthralgias [Joint Pain] : no joint pain [Joint Swelling] : no joint swelling [Itching] : no itching [Muscle Weakness] : no muscle weakness [Swollen Glands] : no swollen glands [Swollen Glands In The Neck] : no swollen glands in the neck

## 2021-05-12 NOTE — ASSESSMENT
[FreeTextEntry1] : GERD,ABdominal bloating, weight gain, s/p egd/colonoscopy unrevealing per patient 9 months ago, \par '\par \par plan ppi daily\par          abdominal us r/o intrabdominal lesion\par          antireflux diet\par           low fodmap diet\par            gas x as needed\par          probiotics.\par            diet and exercise for weight loss.\par         pt to bring in or fax recent egd/colonoscoy reports

## 2021-05-12 NOTE — PHYSICAL EXAM
[General Appearance - Alert] : alert [General Appearance - In No Acute Distress] : in no acute distress [Sclera] : the sclera and conjunctiva were normal [Heart Rate And Rhythm] : heart rate was normal and rhythm regular [Heart Sounds] : normal S1 and S2 [Nail Clubbing] : no clubbing  or cyanosis of the fingernails [] : no rash [Skin Lesions] : no skin lesions [No Focal Deficits] : no focal deficits [Oriented To Time, Place, And Person] : oriented to person, place, and time [Bowel Sounds] : normal bowel sounds [Abdomen Soft] : soft [Abdomen Tenderness] : non-tender [No CVA Tenderness] : no ~M costovertebral angle tenderness [Abnormal Walk] : normal gait

## 2021-05-12 NOTE — HISTORY OF PRESENT ILLNESS
[FreeTextEntry1] : 71 yo male Pashto speaking and patient with c/o GERD and iinflammationof stomach, not on medications.h/o CAD with stents, last time 3 years ago. normal bm, no brpbr, no melena. no weight loss. abdominal bloating  for 5 months.\par Eli translating\par \par s/p colonoscopy 9 months ago and egd 9 months ago, no report available. told okay

## 2021-05-18 ENCOUNTER — APPOINTMENT (OUTPATIENT)
Dept: ULTRASOUND IMAGING | Facility: CLINIC | Age: 71
End: 2021-05-18
Payer: MEDICARE

## 2021-05-18 ENCOUNTER — OUTPATIENT (OUTPATIENT)
Dept: OUTPATIENT SERVICES | Facility: HOSPITAL | Age: 71
LOS: 1 days | End: 2021-05-18
Payer: MEDICARE

## 2021-05-18 DIAGNOSIS — R14.0 ABDOMINAL DISTENSION (GASEOUS): ICD-10-CM

## 2021-05-18 DIAGNOSIS — S82.91XA UNSPECIFIED FRACTURE OF RIGHT LOWER LEG, INITIAL ENCOUNTER FOR CLOSED FRACTURE: Chronic | ICD-10-CM

## 2021-05-18 DIAGNOSIS — R10.9 UNSPECIFIED ABDOMINAL PAIN: ICD-10-CM

## 2021-05-18 PROCEDURE — 76700 US EXAM ABDOM COMPLETE: CPT

## 2021-05-18 PROCEDURE — 76700 US EXAM ABDOM COMPLETE: CPT | Mod: 26

## 2021-05-23 ENCOUNTER — TRANSCRIPTION ENCOUNTER (OUTPATIENT)
Age: 71
End: 2021-05-23

## 2021-05-25 ENCOUNTER — NON-APPOINTMENT (OUTPATIENT)
Age: 71
End: 2021-05-25

## 2021-06-03 ENCOUNTER — APPOINTMENT (OUTPATIENT)
Dept: GASTROENTEROLOGY | Facility: CLINIC | Age: 71
End: 2021-06-03

## 2021-07-13 NOTE — VITALS
Instructions: This plan will send the code FBSE to the PM system.  DO NOT or CHANGE the price. Detail Level: Simple Price (Do Not Change): 0.00 [de-identified] : none

## 2021-07-16 DIAGNOSIS — R14.3 FLATULENCE: ICD-10-CM

## 2021-07-16 RX ORDER — FAMOTIDINE 40 MG/1
40 TABLET, FILM COATED ORAL TWICE DAILY
Qty: 180 | Refills: 1 | Status: DISCONTINUED | COMMUNITY
Start: 2021-05-25 | End: 2021-07-16

## 2021-07-30 ENCOUNTER — APPOINTMENT (OUTPATIENT)
Dept: OPHTHALMOLOGY | Facility: CLINIC | Age: 71
End: 2021-07-30
Payer: MEDICARE

## 2021-07-30 ENCOUNTER — NON-APPOINTMENT (OUTPATIENT)
Age: 71
End: 2021-07-30

## 2021-07-30 ENCOUNTER — APPOINTMENT (OUTPATIENT)
Dept: OPHTHALMOLOGY | Facility: CLINIC | Age: 71
End: 2021-07-30

## 2021-07-30 PROCEDURE — 92133 CPTRZD OPH DX IMG PST SGM ON: CPT

## 2021-07-30 PROCEDURE — 92014 COMPRE OPH EXAM EST PT 1/>: CPT

## 2021-08-05 ENCOUNTER — APPOINTMENT (OUTPATIENT)
Dept: OPHTHALMOLOGY | Facility: CLINIC | Age: 71
End: 2021-08-05
Payer: MEDICARE

## 2021-08-05 ENCOUNTER — NON-APPOINTMENT (OUTPATIENT)
Age: 71
End: 2021-08-05

## 2021-08-05 PROCEDURE — 92012 INTRM OPH EXAM EST PATIENT: CPT

## 2021-08-09 ENCOUNTER — APPOINTMENT (OUTPATIENT)
Dept: OPHTHALMOLOGY | Facility: CLINIC | Age: 71
End: 2021-08-09
Payer: MEDICARE

## 2021-08-09 ENCOUNTER — NON-APPOINTMENT (OUTPATIENT)
Age: 71
End: 2021-08-09

## 2021-08-09 PROCEDURE — 92012 INTRM OPH EXAM EST PATIENT: CPT

## 2021-08-10 ENCOUNTER — APPOINTMENT (OUTPATIENT)
Dept: GASTROENTEROLOGY | Facility: CLINIC | Age: 71
End: 2021-08-10
Payer: MEDICARE

## 2021-08-10 VITALS
BODY MASS INDEX: 29.99 KG/M2 | OXYGEN SATURATION: 97 % | HEART RATE: 98 BPM | TEMPERATURE: 98.7 F | HEIGHT: 65 IN | SYSTOLIC BLOOD PRESSURE: 116 MMHG | DIASTOLIC BLOOD PRESSURE: 60 MMHG | WEIGHT: 180 LBS

## 2021-08-10 PROCEDURE — 99214 OFFICE O/P EST MOD 30 MIN: CPT

## 2021-08-10 NOTE — HISTORY OF PRESENT ILLNESS
[FreeTextEntry1] : 71 yo male with c/o adominal bloating, and some gerd, pt reports adominal bloating and and reflux. patient reports occassional diarrhea, patient has bm daily. Reports gained 3lbs. Reports has 3lb weight gain. patient reports exercising and following low fodmap diet. Luvilla translating\par \par Patient tried  Gas x for gas.

## 2021-08-10 NOTE — REVIEW OF SYSTEMS
[As Noted in HPI] : as noted in HPI [Fever] : no fever [Chills] : no chills [Eyesight Problems] : no eyesight problems [Discharge From Eyes] : no purulent discharge from the eyes [Nosebleeds] : no nosebleeds [Nasal Discharge] : no nasal discharge [Chest Pain] : no chest pain [Cough] : no cough [SOB on Exertion] : no shortness of breath during exertion [Hesitancy] : no urinary hesitancy [Limb Pain] : no limb pain [Itching] : no itching [Dizziness] : no dizziness [Fainting] : no fainting [Anxiety] : no anxiety [Depression] : no depression [Muscle Weakness] : no muscle weakness [Easy Bleeding] : no tendency for easy bleeding [Easy Bruising] : no tendency for easy bruising [Swollen Glands] : no swollen glands

## 2021-08-10 NOTE — PHYSICAL EXAM
[General Appearance - Alert] : alert [General Appearance - In No Acute Distress] : in no acute distress [General Appearance - Well Nourished] : well nourished [General Appearance - Well Developed] : well developed [Sclera] : the sclera and conjunctiva were normal [Hearing Threshold Finger Rub Not [place]] : hearing was normal [Apical Impulse] : the apical impulse was normal [Heart Sounds] : normal S1 and S2 [Bowel Sounds] : normal bowel sounds [Abdomen Soft] : soft [Abdomen Tenderness] : non-tender [Nail Clubbing] : no clubbing  or cyanosis of the fingernails [Skin Lesions] : no skin lesions [No Focal Deficits] : no focal deficits [Oriented To Time, Place, And Person] : oriented to person, place, and time [] : the neck was supple [Respiration, Rhythm And Depth] : normal respiratory rhythm and effort [Auscultation Breath Sounds / Voice Sounds] : lungs were clear to auscultation bilaterally [FreeTextEntry1] : distended [Abnormal Walk] : normal gait [Motor Exam] : the motor exam was normal

## 2021-08-17 ENCOUNTER — APPOINTMENT (OUTPATIENT)
Dept: INTERNAL MEDICINE | Facility: CLINIC | Age: 71
End: 2021-08-17
Payer: MEDICARE

## 2021-08-17 DIAGNOSIS — R53.83 OTHER FATIGUE: ICD-10-CM

## 2021-08-17 PROCEDURE — 99213 OFFICE O/P EST LOW 20 MIN: CPT

## 2021-08-19 ENCOUNTER — APPOINTMENT (OUTPATIENT)
Dept: INTERNAL MEDICINE | Facility: CLINIC | Age: 71
End: 2021-08-19
Payer: MEDICARE

## 2021-08-19 PROCEDURE — 36415 COLL VENOUS BLD VENIPUNCTURE: CPT

## 2021-08-23 ENCOUNTER — NON-APPOINTMENT (OUTPATIENT)
Age: 71
End: 2021-08-23

## 2021-08-23 LAB
25(OH)D3 SERPL-MCNC: 34.4 NG/ML
ALBUMIN SERPL ELPH-MCNC: 4.5 G/DL
ALP BLD-CCNC: 91 U/L
ALT SERPL-CCNC: 41 U/L
ANION GAP SERPL CALC-SCNC: 11 MMOL/L
APPEARANCE: CLEAR
AST SERPL-CCNC: 25 U/L
BACTERIA: NEGATIVE
BASOPHILS # BLD AUTO: 0.04 K/UL
BASOPHILS NFR BLD AUTO: 0.6 %
BILIRUB SERPL-MCNC: 0.6 MG/DL
BILIRUBIN URINE: NEGATIVE
BLOOD URINE: NEGATIVE
BUN SERPL-MCNC: 14 MG/DL
CALCIUM SERPL-MCNC: 9.5 MG/DL
CHLORIDE SERPL-SCNC: 104 MMOL/L
CHOLEST SERPL-MCNC: 120 MG/DL
CO2 SERPL-SCNC: 23 MMOL/L
COLOR: YELLOW
CREAT SERPL-MCNC: 1.1 MG/DL
EOSINOPHIL # BLD AUTO: 0.26 K/UL
EOSINOPHIL NFR BLD AUTO: 4.2 %
ESTIMATED AVERAGE GLUCOSE: 123 MG/DL
GLUCOSE QUALITATIVE U: NEGATIVE
GLUCOSE SERPL-MCNC: 94 MG/DL
HBA1C MFR BLD HPLC: 5.9 %
HCT VFR BLD CALC: 49.8 %
HDLC SERPL-MCNC: 22 MG/DL
HGB BLD-MCNC: 15.4 G/DL
HYALINE CASTS: 0 /LPF
IMM GRANULOCYTES NFR BLD AUTO: 0.3 %
KETONES URINE: NEGATIVE
LDLC SERPL CALC-MCNC: 67 MG/DL
LEUKOCYTE ESTERASE URINE: ABNORMAL
LYMPHOCYTES # BLD AUTO: 2.04 K/UL
LYMPHOCYTES NFR BLD AUTO: 32.8 %
MAN DIFF?: NORMAL
MCHC RBC-ENTMCNC: 28.3 PG
MCHC RBC-ENTMCNC: 30.9 GM/DL
MCV RBC AUTO: 91.5 FL
MICROSCOPIC-UA: NORMAL
MONOCYTES # BLD AUTO: 0.56 K/UL
MONOCYTES NFR BLD AUTO: 9 %
NEUTROPHILS # BLD AUTO: 3.3 K/UL
NEUTROPHILS NFR BLD AUTO: 53.1 %
NITRITE URINE: NEGATIVE
NONHDLC SERPL-MCNC: 98 MG/DL
PH URINE: 6
PLATELET # BLD AUTO: 221 K/UL
POTASSIUM SERPL-SCNC: 4.9 MMOL/L
PROT SERPL-MCNC: 7.2 G/DL
PROTEIN URINE: ABNORMAL
RBC # BLD: 5.44 M/UL
RBC # FLD: 15 %
RED BLOOD CELLS URINE: 3 /HPF
SODIUM SERPL-SCNC: 138 MMOL/L
SPECIFIC GRAVITY URINE: 1.02
SQUAMOUS EPITHELIAL CELLS: 2 /HPF
TRIGL SERPL-MCNC: 156 MG/DL
TSH SERPL-ACNC: 2.15 UIU/ML
URINE COMMENTS: NORMAL
UROBILINOGEN URINE: NORMAL
VIT B12 SERPL-MCNC: 487 PG/ML
WBC # FLD AUTO: 6.22 K/UL
WHITE BLOOD CELLS URINE: 5 /HPF

## 2021-08-23 NOTE — ED PROVIDER NOTE - MUSCULOSKELETAL NEGATIVE STATEMENT, MLM
Punch Size In Mm: 3 Billing Type: Third-Party Bill Consent: Written consent was obtained and risks were reviewed including but not limited to scarring, infection, bleeding, scabbing, incomplete removal, nerve damage and allergy to anesthesia. Validate That Anesthesia Is Not 0: No Epidermal Sutures: none, closed by secondary intention Information: Selecting Yes will display possible errors in your note based on the variables you have selected. This validation is only offered as a suggestion for you. PLEASE NOTE THAT THE VALIDATION TEXT WILL BE REMOVED WHEN YOU FINALIZE YOUR NOTE. IF YOU WANT TO FAX A PRELIMINARY NOTE YOU WILL NEED TO TOGGLE THIS TO 'NO' IF YOU DO NOT WANT IT IN YOUR FAXED NOTE. Detail Level: Detailed Hemostasis: None Dressing: no dressing applied Notification Instructions: Patient will be notified of biopsy results. However, patient instructed to call the office if not contacted within 2 weeks. no back pain, no gout, no musculoskeletal pain, no neck pain, and no weakness. X Size Of Lesion In Cm (Optional): 0 Was A Bandage Applied: Yes Anesthesia Type: 1% lidocaine with 1:100,000 epinephrine and a 1:10 solution of 8.4% sodium bicarbonate Post-Care Instructions: I reviewed with the patient in detail post-care instructions. Patient is to keep the biopsy site dry overnight, and then apply bacitracin twice daily until healed. Patient may apply hydrogen peroxide soaks to remove any crusting. Wound Care: Petrolatum Anesthesia Volume In Cc (Will Not Render If 0): 1 Biopsy Type: H and E Home Suture Removal Text: Patient was provided a home suture removal kit and will remove their sutures at home.  If they have any questions or difficulties they will call the office.

## 2021-08-30 ENCOUNTER — APPOINTMENT (OUTPATIENT)
Dept: ENDOCRINOLOGY | Facility: CLINIC | Age: 71
End: 2021-08-30
Payer: MEDICARE

## 2021-08-30 VITALS — WEIGHT: 178 LBS | HEIGHT: 65.25 IN | TEMPERATURE: 98 F | BODY MASS INDEX: 29.3 KG/M2

## 2021-08-30 PROCEDURE — 77080 DXA BONE DENSITY AXIAL: CPT | Mod: GA

## 2021-08-31 NOTE — HISTORY OF PRESENT ILLNESS
[FreeTextEntry8] : Mr. DEBBIE ANDRADE is a 70 year old man with pmhx of CAD w/ 3 stents, ELLIOTT on cpap, prediabetes, hepatic steatosis, Prediabetes, bph recent rezum, hx of bladder hematoma with resolution / left saphenous vein thrombosis, recently diagnosed with urethtral stricture s/p surgery, chronic gastritis who presents for a follow up. \par \par He had brings in his endoscopy and colonoscopy results. Polyp, internal hemorrhoids and chronic gastritis. He complains he has similar abdominal discomfort and fatigue, he had in the past. \par \par He eats frequently black pepper, he is non adherent to Low fodmap. Printed out food list and explained it to him. He takes beano for gas with some improvement. He request additional medication.

## 2021-09-09 ENCOUNTER — NON-APPOINTMENT (OUTPATIENT)
Age: 71
End: 2021-09-09

## 2021-09-28 DIAGNOSIS — M81.0 AGE-RELATED OSTEOPOROSIS W/OUT CURRENT PATHOLOGICAL FRACTURE: ICD-10-CM

## 2021-09-28 DIAGNOSIS — M25.549 PAIN IN JOINTS OF UNSPECIFIED HAND: ICD-10-CM

## 2021-10-07 ENCOUNTER — APPOINTMENT (OUTPATIENT)
Dept: CARDIOLOGY | Facility: CLINIC | Age: 71
End: 2021-10-07
Payer: MEDICARE

## 2021-10-07 VITALS — SYSTOLIC BLOOD PRESSURE: 112 MMHG | HEART RATE: 70 BPM | OXYGEN SATURATION: 95 % | DIASTOLIC BLOOD PRESSURE: 68 MMHG

## 2021-10-07 DIAGNOSIS — E78.6 LIPOPROTEIN DEFICIENCY: ICD-10-CM

## 2021-10-07 PROCEDURE — 99214 OFFICE O/P EST MOD 30 MIN: CPT

## 2021-10-07 PROCEDURE — 93000 ELECTROCARDIOGRAM COMPLETE: CPT

## 2021-10-10 ENCOUNTER — NON-APPOINTMENT (OUTPATIENT)
Age: 71
End: 2021-10-10

## 2021-10-10 NOTE — CARDIOLOGY SUMMARY
[de-identified] : 10/7/21\par Sinus  Rhythm \par -consider old anterior infarct. \par \par ABNORMAL \par

## 2021-10-10 NOTE — PHYSICAL EXAM
[Well Developed] : well developed [No Acute Distress] : no acute distress [Well Nourished] : well nourished [Normal Conjunctiva] : normal conjunctiva [Normal Venous Pressure] : normal venous pressure [No Carotid Bruit] : no carotid bruit [Normal S1, S2] : normal S1, S2 [No Murmur] : no murmur [No Rub] : no rub [No Gallop] : no gallop [Clear Lung Fields] : clear lung fields [Good Air Entry] : good air entry [No Respiratory Distress] : no respiratory distress  [Soft] : abdomen soft [Non Tender] : non-tender [No Masses/organomegaly] : no masses/organomegaly [Normal Bowel Sounds] : normal bowel sounds [Normal Gait] : normal gait [No Edema] : no edema [No Cyanosis] : no cyanosis [No Clubbing] : no clubbing [No Varicosities] : no varicosities [No Rash] : no rash [No Skin Lesions] : no skin lesions [Moves all extremities] : moves all extremities [No Focal Deficits] : no focal deficits [Alert and Oriented] : alert and oriented [Normal Speech] : normal speech [Normal memory] : normal memory

## 2021-10-10 NOTE — HISTORY OF PRESENT ILLNESS
[FreeTextEntry1] : Ernesto is returning for a f/u\par  \par He feels well. \par No syncope or palpitations\par No falls or blackouts\par No CP\par No LE edema\par \par

## 2021-10-10 NOTE — DISCUSSION/SUMMARY
[Patient] : the patient [Risks] : risks [Benefits] : benefits [Alternatives] : alternatives [With Me] : with me [___ Month(s)] : in [unfilled] month(s) [FreeTextEntry1] : 69 y/o man with prior NSTEMI s/p PCI with YARIEL to pLAD and OM1. Preserved LV function. \par CAD - no angina\par Encouraged more exercise\par May remain on asa monotherapy\par HLD- labs with PCP (aug lipids reviewed)\par F/u 6 mo

## 2021-10-12 ENCOUNTER — APPOINTMENT (OUTPATIENT)
Dept: VASCULAR SURGERY | Facility: CLINIC | Age: 71
End: 2021-10-12
Payer: MEDICARE

## 2021-10-12 VITALS
HEART RATE: 64 BPM | DIASTOLIC BLOOD PRESSURE: 74 MMHG | TEMPERATURE: 97.7 F | HEIGHT: 65 IN | SYSTOLIC BLOOD PRESSURE: 125 MMHG

## 2021-10-12 VITALS — WEIGHT: 175 LBS | BODY MASS INDEX: 29.12 KG/M2

## 2021-10-12 DIAGNOSIS — I83.893 VARICOSE VEINS OF BILATERAL LOWER EXTREMITIES WITH OTHER COMPLICATIONS: ICD-10-CM

## 2021-10-12 PROCEDURE — 93923 UPR/LXTR ART STDY 3+ LVLS: CPT

## 2021-10-12 PROCEDURE — 99214 OFFICE O/P EST MOD 30 MIN: CPT

## 2021-10-12 NOTE — DATA REVIEWED
[FreeTextEntry1] : 2/26/2020 LEESA/PVR RLE \par                                  Leo Lower Extremities with no significant arterial occlusive disease  \par                                   and w vessel calcification\par                                  Rt LEESA  1.20 Lt LEESA  1.24\par                                 \par \par 2/26/2020 Venous Doppler Leo LE  no acute dvt svt \par                            RLE sig for thigh and calf insuff collaterals\par                            LLE  LSV insuff from spj to distal calf level w insuff knee and calf  collaterals\par \par 10/20/20 LLE Venous Duplex LSV ablated no acute dvt , svt\par \par 10/12/2021 LEESA/PVR RLE \par                                  Leo Lower Extremities mild infraing art insuff  \par                                   and w vessel calcification\par                                  Rt LEESA  1.24 Lt LEESA  1.27\par \par

## 2021-10-12 NOTE — PHYSICAL EXAM
[Normal Breath Sounds] : Normal breath sounds [Right Carotid Bruit] : right carotid bruit heard [Left Carotid Bruit] : left carotid bruit heard [2+] : right 2+ [1+] : left 1+ [Ankle Swelling (On Exam)] : present [Ankle Swelling Bilaterally] : bilaterally  [Ankle Swelling On The Left] : moderate [Varicose Veins Of Lower Extremities] : bilaterally [] : bilaterally [Ankle Swelling On The Right] : mild [No HSM] : no hepatosplenomegaly [No Rash or Lesion] : No rash or lesion [Alert] : alert [Oriented to Person] : oriented to person [Oriented to Place] : oriented to place [Oriented to Time] : oriented to time [Calm] : calm [JVD] : no jugular venous distention  [Abdomen Masses] : No abdominal masses [Tender] : was nontender [Stool Sample Taken] : No stool obtained  on rectal exam [de-identified] : nad  [de-identified] : wnl  [FreeTextEntry1] : Mild arterial insufficiency w mild to  moderate trophic skin changes \par no wounds/ulcers\par Mild moderate severe bilateral leg venous insufficiency \par w mild to moderate bilateral leg stasis dermatitis, hyperpigmentation in the gator area, hyperkeratosis (skin thickening)\par and moderate bilateral leg edema \par Multiple  bilateral  leg small and medium  varicose  veins and spider veins  ant medial thigh and calf and shin \par RLE Varicose veins measuring  1-2 mm in size on the calf /shin \par LLE Varicose veins measuring  1-2 mm in size on the calf /shin \par no wounds/ulcers [de-identified] : wnl [de-identified] : Leo Cranial nerves 2-12 leo grossly intact [de-identified] : cooperative

## 2021-10-12 NOTE — ASSESSMENT
[Arterial/Venous Disease] : arterial/venous disease [Medication Management] : medication management [Other: _____] : [unfilled] [Foot care/Footwear] : foot care/footwear [FreeTextEntry1] : Impression symptomatic arterial insuff , symptomatic venous insuff \par \par \par Plan Med Conservative management leg elevation, knee high compression stockings 15-20mm Hg, wt loss, diet control, exercise program, protective measures\par restart  pletal  50 bid \par carotid duplex s/o stenosis next avail\par then ov w clarence/pvr s/o art insuff 12mo oct   2022\par telehealth  visit   2-3 mo to re eval art insuff  sx\par rto prn \par \par \par \par Varicose veins are enlarged, twisted veins. Varicose veins are caused by increased blood pressure in the veins.  The blood moves towards the heart by 1-way valves in the veins. When the valves become weakened or damaged, blood can collect in the veins and pool in your lower legs (ankles). This causes the veins to become enlarged and incompetent with reflux. Sitting or standing for long periods can cause blood to pool in the leg veins, increasing the pressure within the veins. \par Risk factors for varicose veins or venous disease may include:  obesity, older age, standing or sitting for prolonged periods of time for several years, being female, pregnancy, taking oral contraceptive pills or hormone replacement, being inactive, and/or smoking. \par The most common symptoms of varicose veins are sensations in the legs, such as a heavy feeling, burning, and/or aching. However, each individual may experience symptoms differently.  Other symptoms may include:  color changes in the skin, sores on the legs, or rash.  Severe varicose veins or venous disease may eventually produce long-term mild swelling that can result in more serious skin and tissue problems, such as ulcers and non-healing sores.\par Varicose veins and venous disease are diagnosed by a complete medical history, physical examination, and diagnostic studies for varicose veins including duplex ultrasound and color-flow imaging.  \par Medical treatment for varicose veins and venous disease include:  compression stockings, sclerotherapy, endovenous ablation and/or surgical treatment with microphlebectomy.  \par \par

## 2021-10-12 NOTE — HISTORY OF PRESENT ILLNESS
[FreeTextEntry1] : Pt c/o  bilateral leg pain swelling heaviness and discomfort worse w activity and by the end of the day\par  1 year ago \par Intensity moderate \par Pt denies recent injury, travel or thrombophilic event\par \par Pt c/o  bilateral  leg  intermittent claudication at  blocks and nightly , occasional griselda leg and foot nocturnal cramps 1x/week \par pt states that it is affecting his sleep\par Onset 10 mo ago \par Intensity mild \par \par Pt denies any recent  cardiac c/o , SOB, Chest Pain or recent heart attacks \par pt has coronary stents \par pt is employed daily from 11 pm to 6am\par pt states  le  nocturnal leg cramps are the worst \par \par \par \par  [de-identified] : pt is compliant w comp stockings\par pt c/o ongoing le pain swelling heaviness and discomfort \par intensity mild now  since last ov\par pt states ongoing intermittent claudication\par pt took pletal rx for sev mo and then ran out and stopped

## 2021-11-15 ENCOUNTER — APPOINTMENT (OUTPATIENT)
Dept: GASTROENTEROLOGY | Facility: CLINIC | Age: 71
End: 2021-11-15
Payer: MEDICARE

## 2021-11-15 VITALS
TEMPERATURE: 97.5 F | HEIGHT: 65 IN | BODY MASS INDEX: 29.16 KG/M2 | HEART RATE: 66 BPM | DIASTOLIC BLOOD PRESSURE: 70 MMHG | WEIGHT: 175 LBS | SYSTOLIC BLOOD PRESSURE: 108 MMHG

## 2021-11-15 PROCEDURE — 99214 OFFICE O/P EST MOD 30 MIN: CPT

## 2021-11-15 NOTE — ASSESSMENT
[FreeTextEntry1] : 1.abdominal bloating and gas, long discussion regarding management\par \par plan low fodmap diet\par           probiotics\par           trial of xifaxan\par         pt to bring in result of egd/colonoscopy\par 2. fatty liver\par \par plan fibroscan

## 2021-11-15 NOTE — HISTORY OF PRESENT ILLNESS
[FreeTextEntry1] : 70 yo ; male s/p egd/colonoscopy 2 years ago, results not available, per  patient unrevealing\par patient takes ppi , famotidine, and beano. no dairy, no cheese.  patient tries to to follow  low fodmap daily.\par recently us fatty liver. takes probiotics. Maribel transating. normal bms, no brbpr, no melena. no weight loss.

## 2021-11-15 NOTE — REVIEW OF SYSTEMS
[As Noted in HPI] : as noted in HPI [Fever] : no fever [Chills] : no chills [Eyesight Problems] : no eyesight problems [Discharge From Eyes] : no purulent discharge from the eyes [Nosebleeds] : no nosebleeds [Nasal Discharge] : no nasal discharge [Cough] : no cough [SOB on Exertion] : no shortness of breath during exertion [Dysuria] : no dysuria [Incontinence] : no incontinence [Limb Pain] : no limb pain [Itching] : no itching [Dizziness] : no dizziness [Anxiety] : no anxiety [Depression] : no depression [Muscle Weakness] : no muscle weakness [Erectile Dysfunction] : no erectile dysfunction [Swollen Glands] : no swollen glands

## 2021-11-15 NOTE — PHYSICAL EXAM
[General Appearance - Alert] : alert [General Appearance - In No Acute Distress] : in no acute distress [General Appearance - Well Nourished] : well nourished [Sclera] : the sclera and conjunctiva were normal [Respiration, Rhythm And Depth] : normal respiratory rhythm and effort [Auscultation Breath Sounds / Voice Sounds] : lungs were clear to auscultation bilaterally [Heart Rate And Rhythm] : heart rate was normal and rhythm regular [Heart Sounds] : normal S1 and S2 [Bowel Sounds] : normal bowel sounds [Abdomen Soft] : soft [Abdomen Tenderness] : non-tender [Nail Clubbing] : no clubbing  or cyanosis of the fingernails [] : no rash [Skin Lesions] : no skin lesions [No Focal Deficits] : no focal deficits [Oriented To Time, Place, And Person] : oriented to person, place, and time

## 2021-12-05 ENCOUNTER — TRANSCRIPTION ENCOUNTER (OUTPATIENT)
Age: 71
End: 2021-12-05

## 2022-01-03 ENCOUNTER — APPOINTMENT (OUTPATIENT)
Dept: INTERNAL MEDICINE | Facility: CLINIC | Age: 72
End: 2022-01-03
Payer: MEDICARE

## 2022-01-03 PROCEDURE — 99442: CPT | Mod: CS,95

## 2022-01-03 RX ORDER — CILOSTAZOL 50 MG/1
50 TABLET ORAL
Qty: 180 | Refills: 0 | Status: DISCONTINUED | COMMUNITY
Start: 2020-10-20 | End: 2022-01-03

## 2022-01-19 ENCOUNTER — APPOINTMENT (OUTPATIENT)
Dept: VASCULAR SURGERY | Facility: CLINIC | Age: 72
End: 2022-01-19
Payer: MEDICARE

## 2022-01-19 PROCEDURE — 99443: CPT

## 2022-01-19 NOTE — ASSESSMENT
[Arterial/Venous Disease] : arterial/venous disease [Medication Management] : medication management [Other: _____] : [unfilled] [Foot care/Footwear] : foot care/footwear [FreeTextEntry1] : Impression arterial insuff  w new left foot wound \par \par Plan Med Conservative management leg elevation, knee high compression stockings 15-20mm Hg, wt loss, diet control, exercise program, protective measures\par continue   pletal  50 bid \par carotid duplex s/o stenosis next avail\par then ov w clarence/pvr s/o art insuff 12mo oct   2022\par advised pt to present asap to [facility] or [facility] er for eval admit iv abx and  lle angio \par indications risks and benefits  and  risk of foot loss d/w pt\par pt agrees to present to ED sri \par Telephonic visit  time duration 37 min\par \par \par \par Varicose veins are enlarged, twisted veins. Varicose veins are caused by increased blood pressure in the veins.  The blood moves towards the heart by 1-way valves in the veins. When the valves become weakened or damaged, blood can collect in the veins and pool in your lower legs (ankles). This causes the veins to become enlarged and incompetent with reflux. Sitting or standing for long periods can cause blood to pool in the leg veins, increasing the pressure within the veins. \par Risk factors for varicose veins or venous disease may include:  obesity, older age, standing or sitting for prolonged periods of time for several years, being female, pregnancy, taking oral contraceptive pills or hormone replacement, being inactive, and/or smoking. \par The most common symptoms of varicose veins are sensations in the legs, such as a heavy feeling, burning, and/or aching. However, each individual may experience symptoms differently.  Other symptoms may include:  color changes in the skin, sores on the legs, or rash.  Severe varicose veins or venous disease may eventually produce long-term mild swelling that can result in more serious skin and tissue problems, such as ulcers and non-healing sores.\par Varicose veins and venous disease are diagnosed by a complete medical history, physical examination, and diagnostic studies for varicose veins including duplex ultrasound and color-flow imaging.  \par Medical treatment for varicose veins and venous disease include:  compression stockings, sclerotherapy, endovenous ablation and/or surgical treatment with microphlebectomy.  \par \par

## 2022-01-19 NOTE — REASON FOR VISIT
[Home] : at home, [unfilled] , at the time of the visit. [Medical Office: ([facility])___] : at the medical office located in  [Other:____] : [unfilled] [Verbal consent obtained from patient] : the patient, [unfilled] [FreeTextEntry1] : My legs bother me

## 2022-01-19 NOTE — PHYSICAL EXAM
[No Rash or Lesion] : No rash or lesion [Alert] : alert [Oriented to Person] : oriented to person [Oriented to Place] : oriented to place [Oriented to Time] : oriented to time [Calm] : calm [Stool Sample Taken] : No stool obtained  on rectal exam [de-identified] : no resp distress  [FreeTextEntry1] : Physical exam findings via telephonic review with patient \par \par  [de-identified] : cooperative

## 2022-01-19 NOTE — HISTORY OF PRESENT ILLNESS
[FreeTextEntry1] : Pt c/o  bilateral leg pain swelling heaviness and discomfort worse w activity and by the end of the day\par  1 year ago \par Intensity moderate \par Pt denies recent injury, travel or thrombophilic event\par \par Pt c/o  bilateral  leg  intermittent claudication at  blocks and nightly , occasional griselda leg and foot nocturnal cramps 1x/week \par pt states that it is affecting his sleep\par Onset 10 mo ago \par Intensity mild \par \par Pt denies any recent  cardiac c/o , SOB, Chest Pain or recent heart attacks \par pt has coronary stents \par pt is employed daily from 11 pm to 6am\par pt states  le  nocturnal leg cramps are the worst \par \par \par \par  [de-identified] : pt is compliant w comp stockings\par pt c/o ongoing le pain swelling heaviness and discomfort \par intensity mild now  since last ov\par pt states new left lateral foot wound onset 4 days ago\par pt states  2cm in size  and today noticed a 2nd adjacent wound \par [facility]  [facility]  and has recovered from covid \par pt is on pletal 50 bid

## 2022-01-20 ENCOUNTER — INPATIENT (INPATIENT)
Facility: HOSPITAL | Age: 72
LOS: 0 days | Discharge: HOME | DRG: 300 | End: 2022-01-20
Attending: SURGERY | Admitting: SURGERY
Payer: MEDICARE

## 2022-01-20 ENCOUNTER — TRANSCRIPTION ENCOUNTER (OUTPATIENT)
Age: 72
End: 2022-01-20

## 2022-01-20 VITALS
SYSTOLIC BLOOD PRESSURE: 112 MMHG | DIASTOLIC BLOOD PRESSURE: 69 MMHG | OXYGEN SATURATION: 98 % | RESPIRATION RATE: 16 BRPM | TEMPERATURE: 98 F | HEART RATE: 61 BPM

## 2022-01-20 VITALS
DIASTOLIC BLOOD PRESSURE: 72 MMHG | SYSTOLIC BLOOD PRESSURE: 135 MMHG | HEIGHT: 65 IN | TEMPERATURE: 98 F | HEART RATE: 76 BPM | OXYGEN SATURATION: 95 % | RESPIRATION RATE: 20 BRPM | WEIGHT: 175.05 LBS

## 2022-01-20 DIAGNOSIS — I73.9 PERIPHERAL VASCULAR DISEASE, UNSPECIFIED: ICD-10-CM

## 2022-01-20 DIAGNOSIS — I77.1 STRICTURE OF ARTERY: ICD-10-CM

## 2022-01-20 DIAGNOSIS — S82.91XA UNSPECIFIED FRACTURE OF RIGHT LOWER LEG, INITIAL ENCOUNTER FOR CLOSED FRACTURE: Chronic | ICD-10-CM

## 2022-01-20 LAB
ALBUMIN SERPL ELPH-MCNC: 4.2 G/DL — SIGNIFICANT CHANGE UP (ref 3.3–5)
ALP SERPL-CCNC: 86 U/L — SIGNIFICANT CHANGE UP (ref 40–120)
ALT FLD-CCNC: 55 U/L — HIGH (ref 10–45)
ANION GAP SERPL CALC-SCNC: 11 MMOL/L — SIGNIFICANT CHANGE UP (ref 5–17)
APTT BLD: 35.2 SEC — SIGNIFICANT CHANGE UP (ref 27.5–35.5)
AST SERPL-CCNC: 23 U/L — SIGNIFICANT CHANGE UP (ref 10–40)
BILIRUB SERPL-MCNC: 0.6 MG/DL — SIGNIFICANT CHANGE UP (ref 0.2–1.2)
BLD GP AB SCN SERPL QL: NEGATIVE — SIGNIFICANT CHANGE UP
BUN SERPL-MCNC: 16 MG/DL — SIGNIFICANT CHANGE UP (ref 7–23)
CALCIUM SERPL-MCNC: 9.1 MG/DL — SIGNIFICANT CHANGE UP (ref 8.4–10.5)
CHLORIDE SERPL-SCNC: 105 MMOL/L — SIGNIFICANT CHANGE UP (ref 96–108)
CO2 SERPL-SCNC: 23 MMOL/L — SIGNIFICANT CHANGE UP (ref 22–31)
CREAT SERPL-MCNC: 1.03 MG/DL — SIGNIFICANT CHANGE UP (ref 0.5–1.3)
GLUCOSE SERPL-MCNC: 116 MG/DL — HIGH (ref 70–99)
HCT VFR BLD CALC: 46.2 % — SIGNIFICANT CHANGE UP (ref 39–50)
HGB BLD-MCNC: 15.7 G/DL — SIGNIFICANT CHANGE UP (ref 13–17)
INR BLD: 1.03 RATIO — SIGNIFICANT CHANGE UP (ref 0.88–1.16)
MCHC RBC-ENTMCNC: 30.4 PG — SIGNIFICANT CHANGE UP (ref 27–34)
MCHC RBC-ENTMCNC: 34 GM/DL — SIGNIFICANT CHANGE UP (ref 32–36)
MCV RBC AUTO: 89.4 FL — SIGNIFICANT CHANGE UP (ref 80–100)
NRBC # BLD: 0 /100 WBCS — SIGNIFICANT CHANGE UP (ref 0–0)
PLATELET # BLD AUTO: 226 K/UL — SIGNIFICANT CHANGE UP (ref 150–400)
POTASSIUM SERPL-MCNC: 3.6 MMOL/L — SIGNIFICANT CHANGE UP (ref 3.5–5.3)
POTASSIUM SERPL-SCNC: 3.6 MMOL/L — SIGNIFICANT CHANGE UP (ref 3.5–5.3)
PROT SERPL-MCNC: 7.1 G/DL — SIGNIFICANT CHANGE UP (ref 6–8.3)
PROTHROM AB SERPL-ACNC: 12.3 SEC — SIGNIFICANT CHANGE UP (ref 10.6–13.6)
RBC # BLD: 5.17 M/UL — SIGNIFICANT CHANGE UP (ref 4.2–5.8)
RBC # FLD: 12.4 % — SIGNIFICANT CHANGE UP (ref 10.3–14.5)
RH IG SCN BLD-IMP: POSITIVE — SIGNIFICANT CHANGE UP
SARS-COV-2 RNA SPEC QL NAA+PROBE: SIGNIFICANT CHANGE UP
SODIUM SERPL-SCNC: 139 MMOL/L — SIGNIFICANT CHANGE UP (ref 135–145)
WBC # BLD: 6.8 K/UL — SIGNIFICANT CHANGE UP (ref 3.8–10.5)
WBC # FLD AUTO: 6.8 K/UL — SIGNIFICANT CHANGE UP (ref 3.8–10.5)

## 2022-01-20 PROCEDURE — 80053 COMPREHEN METABOLIC PANEL: CPT

## 2022-01-20 PROCEDURE — 99221 1ST HOSP IP/OBS SF/LOW 40: CPT

## 2022-01-20 PROCEDURE — 85027 COMPLETE CBC AUTOMATED: CPT

## 2022-01-20 PROCEDURE — 86900 BLOOD TYPING SEROLOGIC ABO: CPT

## 2022-01-20 PROCEDURE — U0003: CPT

## 2022-01-20 PROCEDURE — 99285 EMERGENCY DEPT VISIT HI MDM: CPT | Mod: 25

## 2022-01-20 PROCEDURE — 85610 PROTHROMBIN TIME: CPT

## 2022-01-20 PROCEDURE — 86850 RBC ANTIBODY SCREEN: CPT

## 2022-01-20 PROCEDURE — U0005: CPT

## 2022-01-20 PROCEDURE — 85730 THROMBOPLASTIN TIME PARTIAL: CPT

## 2022-01-20 PROCEDURE — 93923 UPR/LXTR ART STDY 3+ LVLS: CPT

## 2022-01-20 PROCEDURE — 86901 BLOOD TYPING SEROLOGIC RH(D): CPT

## 2022-01-20 PROCEDURE — 99285 EMERGENCY DEPT VISIT HI MDM: CPT

## 2022-01-20 PROCEDURE — 93005 ELECTROCARDIOGRAM TRACING: CPT

## 2022-01-20 RX ORDER — ATORVASTATIN CALCIUM 80 MG/1
40 TABLET, FILM COATED ORAL AT BEDTIME
Refills: 0 | Status: DISCONTINUED | OUTPATIENT
Start: 2022-01-20 | End: 2022-01-20

## 2022-01-20 RX ORDER — METOPROLOL TARTRATE 50 MG
25 TABLET ORAL
Refills: 0 | Status: DISCONTINUED | OUTPATIENT
Start: 2022-01-20 | End: 2022-01-20

## 2022-01-20 RX ORDER — CILOSTAZOL 100 MG/1
50 TABLET ORAL
Refills: 0 | Status: DISCONTINUED | OUTPATIENT
Start: 2022-01-20 | End: 2022-01-20

## 2022-01-20 RX ORDER — ASPIRIN/CALCIUM CARB/MAGNESIUM 324 MG
81 TABLET ORAL DAILY
Refills: 0 | Status: DISCONTINUED | OUTPATIENT
Start: 2022-01-20 | End: 2022-01-20

## 2022-01-20 RX ORDER — FAMOTIDINE 10 MG/ML
40 INJECTION INTRAVENOUS AT BEDTIME
Refills: 0 | Status: DISCONTINUED | OUTPATIENT
Start: 2022-01-20 | End: 2022-01-20

## 2022-01-20 RX ORDER — LANOLIN ALCOHOL/MO/W.PET/CERES
3 CREAM (GRAM) TOPICAL AT BEDTIME
Refills: 0 | Status: DISCONTINUED | OUTPATIENT
Start: 2022-01-20 | End: 2022-01-20

## 2022-01-20 RX ORDER — LANOLIN ALCOHOL/MO/W.PET/CERES
0 CREAM (GRAM) TOPICAL
Qty: 0 | Refills: 0 | DISCHARGE

## 2022-01-20 RX ORDER — CLOPIDOGREL BISULFATE 75 MG/1
75 TABLET, FILM COATED ORAL DAILY
Refills: 0 | Status: DISCONTINUED | OUTPATIENT
Start: 2022-01-20 | End: 2022-01-20

## 2022-01-20 RX ORDER — HEPARIN SODIUM 5000 [USP'U]/ML
5000 INJECTION INTRAVENOUS; SUBCUTANEOUS EVERY 8 HOURS
Refills: 0 | Status: DISCONTINUED | OUTPATIENT
Start: 2022-01-20 | End: 2022-01-20

## 2022-01-20 RX ADMIN — Medication 81 MILLIGRAM(S): at 11:56

## 2022-01-20 RX ADMIN — CLOPIDOGREL BISULFATE 75 MILLIGRAM(S): 75 TABLET, FILM COATED ORAL at 11:56

## 2022-01-20 NOTE — DISCHARGE NOTE PROVIDER - CARE PROVIDER_API CALL
Braeden Davila)  Vascular Surgery  [address], [address]  [place] [address]  Phone: [phone]  Fax: [phone]  Follow Up Time:

## 2022-01-20 NOTE — DISCHARGE NOTE NURSING/CASE MANAGEMENT/SOCIAL WORK - PATIENT PORTAL LINK FT
You can access the FollowMyHealth Patient Portal offered by [facility] by registering at the following website: [email or web address]. By joining [employer] [facility]’s FollowMyHealth portal, you will also be able to view your health information using other applications (apps) compatible with our system.

## 2022-01-20 NOTE — ED PROVIDER NOTE - SKIN, MLM
left leg: no ttp. no swelling, warmth or erythema present. small abrasion noted to left medial heel of foot. no bleeding, puss or drainage present. b/l pulses intact  to lower extremities.

## 2022-01-20 NOTE — ED PROVIDER NOTE - NSICDXPASTMEDICALHX_GEN_ALL_CORE_FT
PAST MEDICAL HISTORY:  BPH (benign prostatic hyperplasia)     CAD (coronary artery disease)     Diverticulosis     Eczema     ELLIOTT on CPAP     Stented coronary artery

## 2022-01-20 NOTE — ED ADULT NURSE NOTE - OBJECTIVE STATEMENT
71 y.o. male coming in from his doctor via private car for an angiogram. pt states that he was going to his vascular doctor for a check on his legs because he has been having increased pain in his left foot when he walks. PMH CAD, PBH, and diverticulosis. pt states that he tested positive for covid on 12/28/21, has not gotten tested since. A&Ox3, lung sounds clear bilaterally, vitals stable, NSR on tele, bilateral lower extremities cool to touch, pulses present bilaterally in lower extremities, no lower extremity edema noted/capillary refill <2 seconds/AROM on bilateral lower extremities, no abdominal tenderness on palpation. bed in lowest position, call bell in reach and teaching on use completed, no complaints at this time.

## 2022-01-20 NOTE — H&P ADULT - NSHPPHYSICALEXAM_GEN_ALL_CORE
Neuro  and o x 3  Lungs clear  vasc mild art insuff w mild to mod trophic skin changes left medial heel  skin and callus crack  no evid of active wound

## 2022-01-20 NOTE — DISCHARGE NOTE PROVIDER - HOSPITAL COURSE
71M ambulatory hx of diet-controlled DM, CAD s/p stent on asa/plavix, intermittent claudication on pletal here with L medial heel wound x 5 days, nontender. 10/2021 R LEESA 1.24, L LEESA 1.27. Palpable LE pulses throughout. WBC 6. Vascular surgery consulted c/f nonhealing LLE wound. On exam found to have a warm, nontender 3cm L medial calcaneal laceration with no signs of infection, additional plantar L proximal calcaneal lac 1cm nontender also no signs of infection. His pulse exam had palpable bilateral fem/pop/DP/PT 2+. These findings were discussed with Dr. Davila. He determined the patient was able to be discharged with wound care instructions, directions to continue to take Pletal, and clinical follow up.

## 2022-01-20 NOTE — H&P ADULT - NSHPLABSRESULTS_GEN_ALL_CORE
15.7   6.80  )-----------( 226      ( 20 Jan 2022 09:36 )             46.2       01-20    139  |  105  |  16  ----------------------------<  116<H>  3.6   |  23  |  1.03    Ca    9.1      20 Jan 2022 09:36    TPro  7.1  /  Alb  4.2  /  TBili  0.6  /  DBili  x   /  AST  23  /  ALT  55<H>  /  AlkPhos  86  01-20    PT/INR - ( 20 Jan 2022 09:36 )   PT: 12.3 sec;   INR: 1.03 ratio         PTT - ( 20 Jan 2022 09:36 )  PTT:35.2 sec

## 2022-01-20 NOTE — H&P ADULT - ASSESSMENT
71M ambulatory hx of diet-controlled DM, CAD s/p stent on asa/plavix, intermittent claudication on pletal here with L medial heel wound x 5 days    Plan:  -admit to Dr. Davila  -LEESA/PVR (ordered)  -podiatry consulted  -AM labs    Discussed with Dr. Davila    VASCULAR SURGERY  [id number] 71M ambulatory hx of diet-controlled DM, CAD s/p stent on asa/plavix, intermittent claudication on pletal here with L medial heel wound x 5 days    Plan:  -admit to Dr. Davila  -LEESA/PVR (ordered)  -podiatry consulted  -cardiology Dr. Rosas consulted (house cardiology)  will call Dr. Porter for medicine clearance  -AM labs    Discussed with Dr. Davila    VASCULAR SURGERY  [id number] 71M ambulatory hx of diet-controlled DM, CAD s/p stent on asa/plavix, intermittent claudication on pletal here with L medial heel wound x 5 days    Plan:  -med conserv management  continue pletal 50 bid  f/u as outpt   Discussed with Dr. Davila    VASCULAR SURGERY  [id number]

## 2022-01-20 NOTE — ED PROVIDER NOTE - ENMT, MLM
Hide Additional Notes?: No
Detail Level: Simple
Airway patent, Nasal mucosa clear. Mouth with normal mucosa. Throat has no vesicles, no oropharyngeal exudates and uvula is midline.

## 2022-01-20 NOTE — DISCHARGE NOTE PROVIDER - NSDCCPCAREPLAN_GEN_ALL_CORE_FT
PRINCIPAL DISCHARGE DIAGNOSIS  Diagnosis: Claudication  Assessment and Plan of Treatment:       SECONDARY DISCHARGE DIAGNOSES  Diagnosis: Wound of left foot  Assessment and Plan of Treatment:

## 2022-01-20 NOTE — CONSULT NOTE ADULT - ASSESSMENT
71 M L heel fissure  - pt seen and evaluated   - L foot heel fissure to dermis, no pus, no drainage no malodor no cellulitis, no acute SOI  - painted with betadine  - rec OTC moisturizer for dry heels  - pt to f/u w. Dr Ruiz for routine foot care upon d/c, call [phone] for appt  - seen w/ attending

## 2022-01-20 NOTE — H&P ADULT - NSICDXPASTMEDICALHX_GEN_ALL_CORE_FT
PAST MEDICAL HISTORY:  BPH (benign prostatic hyperplasia)     CAD (coronary artery disease) s/p PCI    Diverticulosis     Eczema     ELLIOTT on CPAP     Stented coronary artery

## 2022-01-20 NOTE — ED PROVIDER NOTE - OBJECTIVE STATEMENT
70 y/o male, pmh htn, CAD, 3 stents, on plavix, presents to the ER for an angiogram.  he was seen yesterday by Dr. Davila, and was advised to come to the ER for angiogram.  has had left leg pain for 2 months.  pain only present when he walks or exercises.  has a wound to the bottom of his left foot x 5 days.  denies f/n/v/d, CP, SOB, LOC, numbness, tingling, dizziness, HA, abdominal pain, leg swelling, redness, warmth.

## 2022-01-20 NOTE — DISCHARGE NOTE PROVIDER - NSDCMRMEDTOKEN_GEN_ALL_CORE_FT
aspirin 81 mg oral tablet, chewable: 1 tab(s) orally once a day  atorvastatin 40 mg oral tablet: 1 tab(s) orally once a day (at bedtime) MDD:1  benzonatate 100 mg oral capsule: 1 cap(s) orally 3 times a day, As Needed  clopidogrel 75 mg oral tablet: 1 tab(s) orally once a day MDD:1  famotidine 40 mg oral tablet: 1 tab(s) orally once a day (at bedtime)  Melatonin: orally once a day (at bedtime)  Metoprolol Tartrate 25 mg oral tablet: 1 tab(s) orally 2 times a day  Pletal 50 mg oral tablet: 1 tab(s) orally 2 times a day

## 2022-01-20 NOTE — ED PROVIDER NOTE - CLINICAL SUMMARY MEDICAL DECISION MAKING FREE TEXT BOX
SHELLEY Sanabria MD: Pt is a 72 y/o male with PMH CAD s/p 3 stents, on plavix, who is sent in by Dr. Davila (vascular) after virtual appointment today for eval for possible LE cath. Pt has been c/o b/l LE pain with ambulation. Pt also with some small cracks to his LLE concerning for possible wounds. Denies f/c, CP, SOB, HA, dizziness, rest pain to LEs, trauma, AP, back pain. Pt is ambulatory, with warm, perfused extremities, palpable DP pulses b/l, no obvious ulcers/wounds, 2 small 1-2 cm cracks in skin to L heel without bleeding, drainage or erythema. Plan: preop labs, consult vascular surgery

## 2022-01-20 NOTE — CONSULT NOTE ADULT - SUBJECTIVE AND OBJECTIVE BOX
Podiatry pager #: [phone] ([facility])/ [phone] ([facility])    Patient is a 71y old  Male who presents with a chief complaint of L foot wound (20 Jan 2022 12:07)      HPI:                                                                                              General Surgery Consult  Consulting surgical team: VASCULAR SURGERY  Consulting attending: Dr. Davila    HPI:  71M ambulatory hx of diet-controlled DM, CAD s/p stent on asa/plavix, intermittent claudication on pletal here with L medial heel wound x 5 days, nontender. 10/2021 R LEESA 1.24, L LEESA 1.27. Palpable LE pulses throughout. WBC 6. Vascular surgery consulted c/f nonhealing LLE wound.    PAST MEDICAL HISTORY:  Diverticulosis    ELLIOTT on CPAP    BPH (benign prostatic hyperplasia)    Eczema    Stented coronary artery    CAD (coronary artery disease)        PAST SURGICAL HISTORY:  Leg fracture, right        MEDICATIONS:      ALLERGIES:  No Known Allergies      VITALS & I/Os:  Vital Signs Last 24 Hrs  T(C): 36.3 (20 Jan 2022 09:23), Max: 36.4 (20 Jan 2022 08:53)  T(F): 97.4 (20 Jan 2022 09:23), Max: 97.5 (20 Jan 2022 08:53)  HR: 74 (20 Jan 2022 09:23) (74 - 76)  BP: 145/71 (20 Jan 2022 09:23) (135/72 - 145/71)  BP(mean): --  RR: 16 (20 Jan 2022 09:23) (16 - 20)  SpO2: 99% (20 Jan 2022 09:23) (95% - 99%)    I&O's Summary      PHYSICAL EXAM:  General: No acute distress  Respiratory: Nonlabored  Cardiovascular: appears well perfused  Abdominal: Soft, nondistended, nontender. No rebound or guarding. No organomegaly, no palpable mass.  Extremities: Warm, nontender 3cm L medial calcaneal laceration with no signs of infection, additional plantar L proximal calcaneal lac 1cm nontender also no signs of infection  Pulse exam:  bilateral fem/pop/DP/PT 2+ palpable    LABS:                        15.7   6.80  )-----------( 226      ( 20 Jan 2022 09:36 )             46.2     01-20    139  |  105  |  16  ----------------------------<  116<H>  3.6   |  23  |  1.03    Ca    9.1      20 Jan 2022 09:36    TPro  7.1  /  Alb  4.2  /  TBili  0.6  /  DBili  x   /  AST  23  /  ALT  55<H>  /  AlkPhos  86  01-20    Lactate:                  IMAGING:   (20 Jan 2022 10:25)      PAST MEDICAL & SURGICAL HISTORY:  Diverticulosis    ELLIOTT on CPAP    BPH (benign prostatic hyperplasia)    Eczema    Stented coronary artery    CAD (coronary artery disease)  s/p PCI    Leg fracture, right  tib/fib fracture with surgical repair - closed        MEDICATIONS  (STANDING):  aspirin  chewable 81 milliGRAM(s) Oral daily  atorvastatin 40 milliGRAM(s) Oral at bedtime  cilostazol 50 milliGRAM(s) Oral two times a day  clopidogrel Tablet 75 milliGRAM(s) Oral daily  famotidine    Tablet 40 milliGRAM(s) Oral at bedtime  heparin   Injectable 5000 Unit(s) SubCutaneous every 8 hours  metoprolol tartrate 25 milliGRAM(s) Oral two times a day    MEDICATIONS  (PRN):  benzonatate 100 milliGRAM(s) Oral three times a day PRN Cough  melatonin 3 milliGRAM(s) Oral at bedtime PRN Insomnia      Allergies    No Known Allergies    Intolerances        VITALS:    Vital Signs Last 24 Hrs  T(C): 36.5 (20 Jan 2022 13:12), Max: 36.5 (20 Jan 2022 13:12)  T(F): 97.7 (20 Jan 2022 13:12), Max: 97.7 (20 Jan 2022 13:12)  HR: 61 (20 Jan 2022 13:12) (61 - 76)  BP: 112/69 (20 Jan 2022 13:12) (112/69 - 145/71)  BP(mean): --  RR: 16 (20 Jan 2022 13:12) (16 - 20)  SpO2: 98% (20 Jan 2022 13:12) (95% - 99%)    LABS:                          15.7   6.80  )-----------( 226      ( 20 Jan 2022 09:36 )             46.2       01-20    139  |  105  |  16  ----------------------------<  116<H>  3.6   |  23  |  1.03    Ca    9.1      20 Jan 2022 09:36    TPro  7.1  /  Alb  4.2  /  TBili  0.6  /  DBili  x   /  AST  23  /  ALT  55<H>  /  AlkPhos  86  01-20      CAPILLARY BLOOD GLUCOSE          PT/INR - ( 20 Jan 2022 09:36 )   PT: 12.3 sec;   INR: 1.03 ratio         PTT - ( 20 Jan 2022 09:36 )  PTT:35.2 sec    LOWER EXTREMITY PHYSICAL EXAM:    Vascular: DP/PT 1/4 B/L, CFT <3 seconds B/L, Temperature gradient warm to cool B/L  Neuro: Epicritic sensation intact to the level of ankle B/L  Musculoskeletal/Ortho: unremarkable  Skin: L foot heel fissure to dermis, no pus, no drainage no malodor no cellulitis, no acute SOI      RADIOLOGY & ADDITIONAL STUDIES:

## 2022-01-20 NOTE — ED PROVIDER NOTE - PROGRESS NOTE DETAILS
vascular consulted at this time  pending labs and consult   discussed with attending spoke with surgery  patient to be admitted to Dr. Davila   discussed with attending

## 2022-01-20 NOTE — H&P ADULT - HISTORY OF PRESENT ILLNESS
General Surgery Consult  Consulting surgical team: VASCULAR SURGERY  Consulting attending: Dr. Davila    HPI:  71M ambulatory hx of diet-controlled DM, CAD s/p stent on asa/plavix, intermittent claudication on pletal here with L medial heel wound x 5 days, nontender. 10/2021 R LEESA 1.24, L LEESA 1.27. Palpable LE pulses throughout. WBC 6. Vascular surgery consulted c/f nonhealing LLE wound.    PAST MEDICAL HISTORY:  Diverticulosis    ELLIOTT on CPAP    BPH (benign prostatic hyperplasia)    Eczema    Stented coronary artery    CAD (coronary artery disease)        PAST SURGICAL HISTORY:  Leg fracture, right        MEDICATIONS:      ALLERGIES:  No Known Allergies      VITALS & I/Os:  Vital Signs Last 24 Hrs  T(C): 36.3 (20 Jan 2022 09:23), Max: 36.4 (20 Jan 2022 08:53)  T(F): 97.4 (20 Jan 2022 09:23), Max: 97.5 (20 Jan 2022 08:53)  HR: 74 (20 Jan 2022 09:23) (74 - 76)  BP: 145/71 (20 Jan 2022 09:23) (135/72 - 145/71)  BP(mean): --  RR: 16 (20 Jan 2022 09:23) (16 - 20)  SpO2: 99% (20 Jan 2022 09:23) (95% - 99%)    I&O's Summary      PHYSICAL EXAM:  General: No acute distress  Respiratory: Nonlabored  Cardiovascular: appears well perfused  Abdominal: Soft, nondistended, nontender. No rebound or guarding. No organomegaly, no palpable mass.  Extremities: Warm, nontender 3cm L medial calcaneal laceration with no signs of infection, additional plantar L proximal calcaneal lac 1cm nontender also no signs of infection  Pulse exam:  bilateral fem/pop/DP/PT 2+ palpable    LABS:                        15.7   6.80  )-----------( 226      ( 20 Jan 2022 09:36 )             46.2     01-20    139  |  105  |  16  ----------------------------<  116<H>  3.6   |  23  |  1.03    Ca    9.1      20 Jan 2022 09:36    TPro  7.1  /  Alb  4.2  /  TBili  0.6  /  DBili  x   /  AST  23  /  ALT  55<H>  /  AlkPhos  86  01-20    Lactate:                  IMAGING:

## 2022-01-24 PROBLEM — I25.10 ATHEROSCLEROTIC HEART DISEASE OF NATIVE CORONARY ARTERY WITHOUT ANGINA PECTORIS: Chronic | Status: ACTIVE | Noted: 2018-07-10

## 2022-01-27 ENCOUNTER — APPOINTMENT (OUTPATIENT)
Dept: INTERNAL MEDICINE | Facility: CLINIC | Age: 72
End: 2022-01-27
Payer: MEDICARE

## 2022-01-27 VITALS
HEIGHT: 65 IN | TEMPERATURE: 97 F | WEIGHT: 176 LBS | SYSTOLIC BLOOD PRESSURE: 106 MMHG | BODY MASS INDEX: 29.32 KG/M2 | OXYGEN SATURATION: 95 % | HEART RATE: 71 BPM | DIASTOLIC BLOOD PRESSURE: 72 MMHG

## 2022-01-27 DIAGNOSIS — Z12.83 ENCOUNTER FOR SCREENING FOR MALIGNANT NEOPLASM OF SKIN: ICD-10-CM

## 2022-01-27 DIAGNOSIS — G47.33 OBSTRUCTIVE SLEEP APNEA (ADULT) (PEDIATRIC): ICD-10-CM

## 2022-01-27 PROCEDURE — 99214 OFFICE O/P EST MOD 30 MIN: CPT | Mod: 25

## 2022-01-27 RX ORDER — BENZONATATE 100 MG/1
100 CAPSULE ORAL 3 TIMES DAILY
Qty: 90 | Refills: 1 | Status: DISCONTINUED | COMMUNITY
Start: 2022-01-03 | End: 2022-01-27

## 2022-01-27 RX ORDER — BENZOCAINE/MENTH/CETYLPYRD CL 15 MG-2 MG
10-2.1 LOZENGE MUCOUS MEMBRANE 4 TIMES DAILY
Qty: 30 | Refills: 1 | Status: DISCONTINUED | COMMUNITY
Start: 2022-01-03 | End: 2022-01-27

## 2022-01-27 NOTE — HISTORY OF PRESENT ILLNESS
[FreeTextEntry1] : Follow up [de-identified] : Mr. DEBBIE ANDRADE is a 70 year old man with pmhx of CAD w/ 3 stents, ELLIOTT on cpap, prediabetes, hepatic steatosis, Prediabetes, bph recent rezum, hx of bladder hematoma with resolution / left saphenous vein thrombosis, urethtral stricture s/p surgery, chronic gastritis, recent hospital admission for foot ulcer who presents for a follow up. He recovered from covid. He complained of headaches every day in midnight from cilostazol 50mg daily. He stopped cilostazol a while ago. He endorses taking medications as prescribed. He needs to find a new urologist. He wants to see a podiatrist for his foot. He wants a dermatologist for a bump in his back and skin cancer screening.

## 2022-01-27 NOTE — PHYSICAL EXAM
[No Acute Distress] : no acute distress [Well Nourished] : well nourished [Well Developed] : well developed [Normal Sclera/Conjunctiva] : normal sclera/conjunctiva [EOMI] : extraocular movements intact [Normal Outer Ear/Nose] : the outer ears and nose were normal in appearance [Supple] : supple [No Respiratory Distress] : no respiratory distress  [No Accessory Muscle Use] : no accessory muscle use [Clear to Auscultation] : lungs were clear to auscultation bilaterally [Normal Rate] : normal rate  [Regular Rhythm] : with a regular rhythm [Normal S1, S2] : normal S1 and S2 [No Murmur] : no murmur heard

## 2022-01-27 NOTE — ASSESSMENT
[FreeTextEntry1] : Labs ordered given recetn covid infection. Referral provided as below. Medications reviewed.

## 2022-01-28 LAB
ALBUMIN SERPL ELPH-MCNC: 4.5 G/DL
ALP BLD-CCNC: 92 U/L
ALT SERPL-CCNC: 59 U/L
ANION GAP SERPL CALC-SCNC: 15 MMOL/L
AST SERPL-CCNC: 28 U/L
BASOPHILS # BLD AUTO: 0.06 K/UL
BASOPHILS NFR BLD AUTO: 0.9 %
BILIRUB SERPL-MCNC: 0.7 MG/DL
BUN SERPL-MCNC: 16 MG/DL
CALCIUM SERPL-MCNC: 9.2 MG/DL
CHLORIDE SERPL-SCNC: 102 MMOL/L
CHOLEST SERPL-MCNC: 114 MG/DL
CO2 SERPL-SCNC: 21 MMOL/L
CREAT SERPL-MCNC: 0.93 MG/DL
EOSINOPHIL # BLD AUTO: 0.39 K/UL
EOSINOPHIL NFR BLD AUTO: 5.8 %
ESTIMATED AVERAGE GLUCOSE: 123 MG/DL
GLUCOSE SERPL-MCNC: 92 MG/DL
HBA1C MFR BLD HPLC: 5.9 %
HCT VFR BLD CALC: 50 %
HDLC SERPL-MCNC: 22 MG/DL
HGB BLD-MCNC: 16.3 G/DL
IMM GRANULOCYTES NFR BLD AUTO: 0.1 %
LDLC SERPL CALC-MCNC: 58 MG/DL
LYMPHOCYTES # BLD AUTO: 1.81 K/UL
LYMPHOCYTES NFR BLD AUTO: 26.9 %
MAN DIFF?: NORMAL
MCHC RBC-ENTMCNC: 30.1 PG
MCHC RBC-ENTMCNC: 32.6 GM/DL
MCV RBC AUTO: 92.3 FL
MONOCYTES # BLD AUTO: 0.6 K/UL
MONOCYTES NFR BLD AUTO: 8.9 %
NEUTROPHILS # BLD AUTO: 3.85 K/UL
NEUTROPHILS NFR BLD AUTO: 57.4 %
NONHDLC SERPL-MCNC: 92 MG/DL
PLATELET # BLD AUTO: 223 K/UL
POTASSIUM SERPL-SCNC: 3.7 MMOL/L
PROT SERPL-MCNC: 7.3 G/DL
PSA SERPL-MCNC: 2.26 NG/ML
RBC # BLD: 5.42 M/UL
RBC # FLD: 12.7 %
SODIUM SERPL-SCNC: 138 MMOL/L
TRIGL SERPL-MCNC: 171 MG/DL
TSH SERPL-ACNC: 2.23 UIU/ML
WBC # FLD AUTO: 6.72 K/UL

## 2022-01-30 LAB — BACTERIA UR CULT: NORMAL

## 2022-02-10 ENCOUNTER — APPOINTMENT (OUTPATIENT)
Dept: DERMATOLOGY | Facility: CLINIC | Age: 72
End: 2022-02-10
Payer: MEDICARE

## 2022-02-10 DIAGNOSIS — L30.9 DERMATITIS, UNSPECIFIED: ICD-10-CM

## 2022-02-10 DIAGNOSIS — D18.01 HEMANGIOMA OF SKIN AND SUBCUTANEOUS TISSUE: ICD-10-CM

## 2022-02-10 PROCEDURE — 99203 OFFICE O/P NEW LOW 30 MIN: CPT

## 2022-02-17 ENCOUNTER — APPOINTMENT (OUTPATIENT)
Dept: UROLOGY | Facility: CLINIC | Age: 72
End: 2022-02-17
Payer: COMMERCIAL

## 2022-02-17 VITALS
RESPIRATION RATE: 16 BRPM | DIASTOLIC BLOOD PRESSURE: 67 MMHG | BODY MASS INDEX: 28.49 KG/M2 | SYSTOLIC BLOOD PRESSURE: 106 MMHG | HEIGHT: 65 IN | HEART RATE: 74 BPM | WEIGHT: 171 LBS

## 2022-02-17 PROCEDURE — 51798 US URINE CAPACITY MEASURE: CPT

## 2022-02-17 PROCEDURE — 99213 OFFICE O/P EST LOW 20 MIN: CPT

## 2022-02-17 PROCEDURE — 99203 OFFICE O/P NEW LOW 30 MIN: CPT

## 2022-02-17 NOTE — HISTORY OF PRESENT ILLNESS
[FreeTextEntry1] : : Oct 21 1950 \par Referring Provider: none \par \par HPI: Mr. PJ ANDRADE is a 71 year yo M with a PMHx notable for BPH, s/p prior ablation 6 years ago and a Rezum 2 years ago. At that time he was having urinary frequency and nocturia and was on Flomax. Procedure was done at [facility]. He now presents to establish care and follow up of his prostate - had insurance issues with previous provider so presents today to establish care. He has no urinary complaints, is continent, no frequency, urgency, nocturia, or feelings of incomplete emptying. \par \par He also asks abut his reproductive function should he choose to have another child, son  at young age. No ED but mentions that he does not produce much ejaculate (likely 2/2 prostate procedures). He mentions some occasional, slight feeling of pain/irritation in his bladder that he rates 1/10. PVR 3ml. \par \par Anticoagulation: None\par All: NKDA\par Social: . Retired . Never smoker. No EtOH. Had 1 son who passed away age 41. \par PMHx: No medications. \par FHx: No prostate cancer. Kidney cancer in older sister 12 years ago\par PSHx: Prostate "ablation" 6 years ago and Rezum 2 years ago\par  [Urinary Incontinence] : no urinary incontinence [Urinary Retention] : no urinary retention [Urinary Urgency] : no urinary urgency [Urinary Frequency] : no urinary frequency [Nocturia] : no nocturia

## 2022-02-17 NOTE — ASSESSMENT
[FreeTextEntry1] : 72 yo M here today with BPH s/p ablation procedures x 2 and concerns around ejaculation

## 2022-02-28 ENCOUNTER — APPOINTMENT (OUTPATIENT)
Dept: PULMONOLOGY | Facility: CLINIC | Age: 72
End: 2022-02-28
Payer: MEDICARE

## 2022-02-28 VITALS
HEART RATE: 70 BPM | DIASTOLIC BLOOD PRESSURE: 63 MMHG | TEMPERATURE: 97 F | HEIGHT: 65 IN | BODY MASS INDEX: 29.49 KG/M2 | OXYGEN SATURATION: 94 % | RESPIRATION RATE: 15 BRPM | SYSTOLIC BLOOD PRESSURE: 101 MMHG | WEIGHT: 177 LBS

## 2022-02-28 PROCEDURE — 99204 OFFICE O/P NEW MOD 45 MIN: CPT

## 2022-02-28 NOTE — END OF VISIT
[] : Fellow [Time Spent: ___ minutes] : I have spent [unfilled] minutes of time on the encounter. [FreeTextEntry3] : 71-year-old male with significant past medical history of obstructive sleep apnea who comes in to establish care with me.  Even though the patient had a sleep study done in 2012 he indicates that he did not receive treatment during that time.  Had a sleep study done in outside facility roughly 3 to 4 years ago was found to have obstructive sleep apnea during that time and received the machine.  Patient indicates that he has been using his machine on a daily basis continues to have some snoring and witnessed apnea in addition to excessive daytime sleepiness.  He did not bring his report nor his compliance information in this encounter.  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  Patient is also aware that his current machine is recalled.  We discussed the recall and I encouraged him to follow-up with [facility] regarding registering his device.\par \par At this time there is little that I can do in order to help him out without more evidence.  Therefore the patient will obtain his latest sleep study and get me the name of his durable medical company so that I may linked to our accounts.  Once this is completed I will be able to further evaluate the patient regarding his CPAP device and determine if he is on the correct pressures needed to eliminate his sleep disordered breathing.\par \par He will follow up with me in 1 month.

## 2022-02-28 NOTE — REVIEW OF SYSTEMS
[EDS: ESS=____] : daytime somnolence: ESS=[unfilled] [Witnessed Apneas] : witnessed apnea [A.M. Dry Mouth] : a.m. dry mouth [Negative] : Psychiatric [Snoring] : no snoring [Difficulty Initiating Sleep] : no difficulty falling asleep [Difficulty Maintaining Sleep] : no difficulty maintaining sleep [Lower Extremity Discomfort] : no lower extremity discomfort [Irresistible urge to move legs] : no irresistible urge to move legs because of lower extremity discomfort [Late day/ Evening symptoms] : no late day/evening symptoms [Sleep Disturbances due to LE symptoms] : ~T no sleep disturbances due to lower extremity symptoms [Hypersomnolence] : not sleeping much more than usual [Cataplexy] :  no cataplexy [Hypnogogic Hallucinations] : no hypnogogic hallucinations [Hypnopompic Hallucinations] : no hypnopompic hallucinations [Sleep Paralysis] : no sleep paralysis

## 2022-02-28 NOTE — ASSESSMENT
[FreeTextEntry1] : 70 yo M PMH CAD s/p stents, ELLIOTT on CPAP, preDM, hepatic steatosis, BPH s/p rezum, bladder hematoma, saphenous vein thrombosis, urethral stricture s/p surgery, chronic gastritis, and foot ulcer presenting for initial evaluation of previously diagnosed sleep apnea. \par \par - reports continued symptoms despite PAP therapy - some of which may be related to sleeping pattern 2/2 shift work; recommended patient bring prior sleep study as well as compliance data in order to assess need for possible CPAP titration. Will continue continue CPAP settings for now. Patient to contact clinic after requested data has been sent\par \par RTC after data sent

## 2022-02-28 NOTE — REASON FOR VISIT
[Initial Evaluation] : an initial evaluation [Sleep Apnea] : sleep apnea [FreeTextEntry2] : Sleep apnea

## 2022-02-28 NOTE — PHYSICAL EXAM
[General Appearance - Well Developed] : well developed [Normal Appearance] : normal appearance [General Appearance - Well Nourished] : well nourished [Normal Conjunctiva] : the conjunctiva exhibited no abnormalities [Low Lying Soft Palate] : low lying soft palate [Neck Appearance] : the appearance of the neck was normal [Heart Rate And Rhythm] : heart rate was normal and rhythm regular [Heart Sounds] : normal S1 and S2 [Respiration, Rhythm And Depth] : normal respiratory rhythm and effort [Auscultation Breath Sounds / Voice Sounds] : lungs were clear to auscultation bilaterally [Abnormal Walk] : normal gait [Cyanosis, Localized] : no localized cyanosis [Skin Color & Pigmentation] : normal skin color and pigmentation [] : no rash [No Focal Deficits] : no focal deficits [Oriented To Time, Place, And Person] : oriented to person, place, and time [Impaired Insight] : insight and judgment were intact [Affect] : the affect was normal

## 2022-02-28 NOTE — HISTORY OF PRESENT ILLNESS
[Snoring] : snoring [Witnessed Apneas] : witnessed sleep apnea [Awakes Unrefreshed] : awakening unrefreshed [Awakes with Headache] : headache upon awakening [Awakening With Dry Mouth] : awakening with dry mouth [Daytime Somnolence] : daytime somnolence [Obstructive Sleep Apnea] : obstructive sleep apnea [FreeTextEntry1] :  [id number]\par \par 72 yo M PMH CAD s/p stents, ELLIOTT on CPAP, preDM, hepatic steatosis, BPH s/p rezum, bladder hematoma, saphenous vein thrombosis, urethral stricture s/p surgery, chronic gastritis, and foot ulcer presenting for initial evaluation of sleep apnea. \par \par Last sleep study in 4/2012 with AHI of 14.5. Reports followed with outside sleep physician and was prescribed PAP therapy for symptoms of snoring as well as daytime somnolence, headaches, awakening unrefreshed, and dry mouth. Has only been on CPAP for the past 2.5-3 years. Denies DIS/DMS, nocturnal awakenings. Denies significant improvement in symptoms w/ PAP therapy other than cessation of snoring. Last seen by sleep physician 2 years ago. 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 \par \par Shx: \par denies toxic habits x3\par works in hotels\par \par Allergies:\par NKDA [Frequent Nocturnal Awakening] : no nocturnal awakening [Recent  Weight Gain] : no recent weight gain [DIS] : no DIS [DMS] : no DMS [Lower Extremity Discomfort] : no lower extremity discomfort in evening or at bedtime [ESS] : 10

## 2022-03-02 ENCOUNTER — APPOINTMENT (OUTPATIENT)
Dept: INTERNAL MEDICINE | Facility: CLINIC | Age: 72
End: 2022-03-02

## 2022-03-02 VITALS
HEART RATE: 76 BPM | BODY MASS INDEX: 29.79 KG/M2 | SYSTOLIC BLOOD PRESSURE: 108 MMHG | DIASTOLIC BLOOD PRESSURE: 64 MMHG | OXYGEN SATURATION: 95 % | TEMPERATURE: 97.7 F | WEIGHT: 181 LBS | HEIGHT: 65.5 IN

## 2022-03-09 ENCOUNTER — NON-APPOINTMENT (OUTPATIENT)
Age: 72
End: 2022-03-09

## 2022-03-28 ENCOUNTER — NON-APPOINTMENT (OUTPATIENT)
Age: 72
End: 2022-03-28

## 2022-03-29 ENCOUNTER — APPOINTMENT (OUTPATIENT)
Dept: VASCULAR SURGERY | Facility: CLINIC | Age: 72
End: 2022-03-29
Payer: MEDICARE

## 2022-03-29 VITALS
SYSTOLIC BLOOD PRESSURE: 99 MMHG | DIASTOLIC BLOOD PRESSURE: 54 MMHG | HEIGHT: 65.5 IN | WEIGHT: 155 LBS | BODY MASS INDEX: 25.52 KG/M2 | HEART RATE: 70 BPM | TEMPERATURE: 96.44 F

## 2022-03-29 DIAGNOSIS — I87.2 VENOUS INSUFFICIENCY (CHRONIC) (PERIPHERAL): ICD-10-CM

## 2022-03-29 DIAGNOSIS — L98.499 STRICTURE OF ARTERY: ICD-10-CM

## 2022-03-29 DIAGNOSIS — I77.1 STRICTURE OF ARTERY: ICD-10-CM

## 2022-03-29 PROCEDURE — 93880 EXTRACRANIAL BILAT STUDY: CPT

## 2022-03-29 PROCEDURE — 99214 OFFICE O/P EST MOD 30 MIN: CPT

## 2022-03-29 NOTE — ASSESSMENT
[Arterial/Venous Disease] : arterial/venous disease [Medication Management] : medication management [Other: _____] : [unfilled] [Foot care/Footwear] : foot care/footwear [FreeTextEntry1] : Impression arterial insuff   left foot wound  w healing \par \par Plan Med Conservative management leg elevation, knee high compression stockings 15-20mm Hg, wt loss, diet control, exercise program, protective measures\par continue   pletal  50 bid \par will hold off on le angio at this time and continue to monitor wound \par advised pt to call if any new changes'pt agrees to do so \par ov w carotid duplex s/o stenosis  2 years march 2024\par ov w clarence/pvr s/o art insuff 12mo oct   2022\par \par \par \par \par Varicose veins are enlarged, twisted veins. Varicose veins are caused by increased blood pressure in the veins.  The blood moves towards the heart by 1-way valves in the veins. When the valves become weakened or damaged, blood can collect in the veins and pool in your lower legs (ankles). This causes the veins to become enlarged and incompetent with reflux. Sitting or standing for long periods can cause blood to pool in the leg veins, increasing the pressure within the veins. \par Risk factors for varicose veins or venous disease may include:  obesity, older age, standing or sitting for prolonged periods of time for several years, being female, pregnancy, taking oral contraceptive pills or hormone replacement, being inactive, and/or smoking. \par The most common symptoms of varicose veins are sensations in the legs, such as a heavy feeling, burning, and/or aching. However, each individual may experience symptoms differently.  Other symptoms may include:  color changes in the skin, sores on the legs, or rash.  Severe varicose veins or venous disease may eventually produce long-term mild swelling that can result in more serious skin and tissue problems, such as ulcers and non-healing sores.\par Varicose veins and venous disease are diagnosed by a complete medical history, physical examination, and diagnostic studies for varicose veins including duplex ultrasound and color-flow imaging.  \par Medical treatment for varicose veins and venous disease include:  compression stockings, sclerotherapy, endovenous ablation and/or surgical treatment with microphlebectomy.  \par \par

## 2022-03-29 NOTE — PHYSICAL EXAM
[Normal Breath Sounds] : Normal breath sounds [Right Carotid Bruit] : right carotid bruit heard [Left Carotid Bruit] : left carotid bruit heard [1+] : left 1+ [Ankle Swelling (On Exam)] : present [Ankle Swelling Bilaterally] : bilaterally  [Ankle Swelling On The Left] : moderate [Varicose Veins Of Lower Extremities] : bilaterally [] : bilaterally [Ankle Swelling On The Right] : mild [No Rash or Lesion] : No rash or lesion [Alert] : alert [Oriented to Person] : oriented to person [Oriented to Place] : oriented to place [Oriented to Time] : oriented to time [Calm] : calm [2+] : left 2+ [JVD] : no jugular venous distention  [de-identified] : nad  [de-identified] : wnl  [de-identified] : no resp distress  [FreeTextEntry1] : Mild arterial insufficiency w mild to  moderate trophic skin changes \par left medial heel  wound w eschar w healing and eschar 2mm x5cm \par no drainage \par Mild  bilateral leg venous insufficiency \par w mild to moderate bilateral leg stasis dermatitis, hyperpigmentation in the gator area, hyperkeratosis (skin thickening)\par and moderate bilateral leg edema \par Multiple  bilateral  leg small and medium  varicose  veins and spider veins  ant medial thigh and calf and shin \par RLE Varicose veins measuring  1-2 mm in size on the calf /shin \par LLE Varicose veins measuring  1-2 mm in size on the calf /shin \par no wounds/ulcers [de-identified] : wnl [de-identified] : Leo Cranial nerves 2-12 leo grossly intact [de-identified] : cooperative

## 2022-03-29 NOTE — HISTORY OF PRESENT ILLNESS
[FreeTextEntry1] : Pt c/o  bilateral leg pain swelling heaviness and discomfort worse w activity and by the end of the day\par  1 year ago \par Intensity moderate \par Pt denies recent injury, travel or thrombophilic event\par \par Pt c/o  bilateral  leg  intermittent claudication at  blocks and nightly , occasional griselda leg and foot nocturnal cramps 1x/week \par pt states that it is affecting his sleep\par Onset 10 mo ago \par Intensity mild \par \par Pt denies any recent  cardiac c/o , SOB, Chest Pain or recent heart attacks \par pt has coronary stents \par pt is employed daily from 11 pm to 6am\par pt states  le  nocturnal leg cramps are the worst \par \par \par \par  [de-identified] : pt is compliant w comp stockings\par pt  states less  le pain swelling heaviness and discomfort \par intensity mild now  since last ov\par pt is on pletal 50 bid \par pt states no intermittent claudic or noct leg and foot cramps at this time

## 2022-03-29 NOTE — DATA REVIEWED
[FreeTextEntry1] : 2/26/2020 LEESA/PVR RLE \par                                  Leo Lower Extremities with no significant arterial occlusive disease  \par                                   and w vessel calcification\par                                  Rt LEESA  1.20 Lt LEESA  1.24\par                                 \par \par 2/26/2020 Venous Doppler Leo LE  no acute dvt svt \par                            RLE sig for thigh and calf insuff collaterals\par                            LLE  LSV insuff from spj to distal calf level w insuff knee and calf  collaterals\par \par 10/20/20 LLE Venous Duplex LSV ablated no acute dvt , svt\par \par 10/12/2021 LEESA/PVR RLE \par                                  Leo Lower Extremities mild infraing art insuff  \par                                   and w vessel calcification\par                                  Rt LEESA  1.24 Lt LEESA  1.27\par \par 3/29/2022 Carotid Duplex  Rt ICA  less 50% stenosis  by velocity criteria\par                          Lt  ICA  less 50% stenosis  by velocity criteria\par                           Leo Ant Vertebral Arterial Flow \par \par

## 2022-04-07 ENCOUNTER — APPOINTMENT (OUTPATIENT)
Dept: CARDIOLOGY | Facility: CLINIC | Age: 72
End: 2022-04-07
Payer: MEDICARE

## 2022-04-07 VITALS
DIASTOLIC BLOOD PRESSURE: 69 MMHG | BODY MASS INDEX: 25.4 KG/M2 | OXYGEN SATURATION: 96 % | WEIGHT: 155 LBS | HEART RATE: 67 BPM | SYSTOLIC BLOOD PRESSURE: 118 MMHG

## 2022-04-07 DIAGNOSIS — R07.89 OTHER CHEST PAIN: ICD-10-CM

## 2022-04-07 PROCEDURE — 99214 OFFICE O/P EST MOD 30 MIN: CPT

## 2022-04-07 PROCEDURE — 93000 ELECTROCARDIOGRAM COMPLETE: CPT

## 2022-04-14 ENCOUNTER — APPOINTMENT (OUTPATIENT)
Dept: RHEUMATOLOGY | Facility: CLINIC | Age: 72
End: 2022-04-14

## 2022-04-14 ENCOUNTER — APPOINTMENT (OUTPATIENT)
Dept: INTERNAL MEDICINE | Facility: CLINIC | Age: 72
End: 2022-04-14
Payer: MEDICARE

## 2022-04-14 VITALS
BODY MASS INDEX: 29.14 KG/M2 | DIASTOLIC BLOOD PRESSURE: 70 MMHG | HEART RATE: 74 BPM | TEMPERATURE: 97.8 F | OXYGEN SATURATION: 97 % | SYSTOLIC BLOOD PRESSURE: 122 MMHG | HEIGHT: 65.5 IN | WEIGHT: 177 LBS

## 2022-04-14 DIAGNOSIS — G89.29 HEADACHE, UNSPECIFIED: ICD-10-CM

## 2022-04-14 DIAGNOSIS — Z23 ENCOUNTER FOR IMMUNIZATION: ICD-10-CM

## 2022-04-14 DIAGNOSIS — R51.9 HEADACHE, UNSPECIFIED: ICD-10-CM

## 2022-04-14 PROCEDURE — 90471 IMMUNIZATION ADMIN: CPT

## 2022-04-14 PROCEDURE — 90715 TDAP VACCINE 7 YRS/> IM: CPT

## 2022-04-14 PROCEDURE — 99214 OFFICE O/P EST MOD 30 MIN: CPT | Mod: 25

## 2022-04-14 RX ORDER — OMEGA-3-ACID ETHYL ESTERS CAPSULES 1 G/1
1 CAPSULE, LIQUID FILLED ORAL
Qty: 90 | Refills: 3 | Status: DISCONTINUED | COMMUNITY
Start: 2021-03-26 | End: 2022-04-14

## 2022-04-14 NOTE — HISTORY OF PRESENT ILLNESS
[FreeTextEntry8] : Mr. DEBBIE ANDRADE is a 70 year old man with pmhx of CAD w/ 3 stents, ELLIOTT on cpap, prediabetes, hepatic steatosis, Prediabetes, bph recent rezum, hx of bladder hematoma with resolution / left saphenous vein thrombosis, urethtral stricture s/p surgery, chronic gastritis, hx of foot ulcer who presents for an acute visit. \par \par He endorses rash in his penis for a week. It appears like Candidal balanitis, he is using clotrimazole/bethamethasone at home. \par \par He is having a cardiac cath on tuesday. He is taking omega 3 supplementation for triglycerides. Switching to vascepa for further improvement as per REDUCE it trial and EVAPORATE trial.  \par \par He complains of headaches, chronic, takes otc nsaids. Wants to see a neurologist.  He is due for TDAP vaccine. \par \par He is due for tdap vaccine.

## 2022-04-14 NOTE — PHYSICAL EXAM
[No Acute Distress] : no acute distress [Well Nourished] : well nourished [Well Developed] : well developed [Normal Sclera/Conjunctiva] : normal sclera/conjunctiva [EOMI] : extraocular movements intact [Normal Outer Ear/Nose] : the outer ears and nose were normal in appearance [No JVD] : no jugular venous distention [No Lymphadenopathy] : no lymphadenopathy [Supple] : supple [No Respiratory Distress] : no respiratory distress  [No Accessory Muscle Use] : no accessory muscle use [Clear to Auscultation] : lungs were clear to auscultation bilaterally [Normal Rate] : normal rate  [Regular Rhythm] : with a regular rhythm [Normal S1, S2] : normal S1 and S2 [No Murmur] : no murmur heard [Pedal Pulses Present] : the pedal pulses are present [No Edema] : there was no peripheral edema [No Palpable Aorta] : no palpable aorta [No Extremity Clubbing/Cyanosis] : no extremity clubbing/cyanosis [Soft] : abdomen soft [Non Tender] : non-tender [Non-distended] : non-distended [No HSM] : no HSM [Normal Bowel Sounds] : normal bowel sounds

## 2022-04-14 NOTE — DATA REVIEWED
[No studies available for review at this time.] : No studies available for review at this time. Current some day smoker

## 2022-04-18 ENCOUNTER — APPOINTMENT (OUTPATIENT)
Dept: OTOLARYNGOLOGY | Facility: CLINIC | Age: 72
End: 2022-04-18
Payer: MEDICARE

## 2022-04-18 ENCOUNTER — OUTPATIENT (OUTPATIENT)
Dept: OUTPATIENT SERVICES | Facility: HOSPITAL | Age: 72
LOS: 1 days | Discharge: HOME | End: 2022-04-18

## 2022-04-18 VITALS
SYSTOLIC BLOOD PRESSURE: 108 MMHG | DIASTOLIC BLOOD PRESSURE: 66 MMHG | HEART RATE: 71 BPM | BODY MASS INDEX: 28.81 KG/M2 | WEIGHT: 175 LBS | HEIGHT: 65.5 IN

## 2022-04-18 DIAGNOSIS — H61.20 IMPACTED CERUMEN, UNSPECIFIED EAR: ICD-10-CM

## 2022-04-18 DIAGNOSIS — H61.23 IMPACTED CERUMEN, BILATERAL: ICD-10-CM

## 2022-04-18 DIAGNOSIS — Z87.898 PERSONAL HISTORY OF OTHER SPECIFIED CONDITIONS: ICD-10-CM

## 2022-04-18 DIAGNOSIS — R06.83 SNORING: ICD-10-CM

## 2022-04-18 DIAGNOSIS — S82.91XA UNSPECIFIED FRACTURE OF RIGHT LOWER LEG, INITIAL ENCOUNTER FOR CLOSED FRACTURE: Chronic | ICD-10-CM

## 2022-04-18 PROCEDURE — 69210 REMOVE IMPACTED EAR WAX UNI: CPT

## 2022-04-18 PROCEDURE — 99203 OFFICE O/P NEW LOW 30 MIN: CPT | Mod: 25

## 2022-04-18 NOTE — ASSESSMENT
[FreeTextEntry1] : 71M h/o ELLIOTT followed by pulm p/w snoring and gasping for air with CPAP machine. Also with b/l cerumen impaction and L jaw pain. \par -f/u with your dentist regarding L TMJ dysfunction, avoid excessive chewing on that side\par -ofloxacin drops to L ear x3d for small abrasion s/p cerumen removal\par -f/u with pulmonologist regarding CPAP, pt has L deviated septum but good airflow through b/l nares

## 2022-04-18 NOTE — HISTORY OF PRESENT ILLNESS
[de-identified] : 71 year old man initial visit for sleep apnea diagnosed with sleep study 5 years ago, currently being managed by his pulmonologist. Last seen in Feb, actively titrating settings. Reports continues to have some snoring and choking for air with CPAP. Also reports some clogging in ears, no HL. Reports some pain in L jaw worse with chewing.

## 2022-04-18 NOTE — PHYSICAL EXAM
[de-identified] : mild clicking on L [de-identified] : b/l cerumen impaction [de-identified] : s/p cerumen removal, L wnl and R with neotympanum [Normal] : nasopharynx is normal [FreeTextEntry1] : good airflow through b/l nares

## 2022-04-18 NOTE — REASON FOR VISIT
[Initial Evaluation] : an initial evaluation for [FreeTextEntry2] : possible sleep apnea [Interpreters_IDNumber] : [id number]

## 2022-04-19 ENCOUNTER — INPATIENT (INPATIENT)
Facility: HOSPITAL | Age: 72
LOS: 0 days | Discharge: HOME | DRG: 247 | End: 2022-04-20
Attending: INTERNAL MEDICINE | Admitting: INTERNAL MEDICINE
Payer: MEDICARE

## 2022-04-19 VITALS
DIASTOLIC BLOOD PRESSURE: 75 MMHG | HEIGHT: 65 IN | WEIGHT: 175.05 LBS | SYSTOLIC BLOOD PRESSURE: 130 MMHG | OXYGEN SATURATION: 95 % | RESPIRATION RATE: 16 BRPM | HEART RATE: 73 BPM | TEMPERATURE: 98 F

## 2022-04-19 DIAGNOSIS — S82.91XA UNSPECIFIED FRACTURE OF RIGHT LOWER LEG, INITIAL ENCOUNTER FOR CLOSED FRACTURE: Chronic | ICD-10-CM

## 2022-04-19 DIAGNOSIS — R06.00 DYSPNEA, UNSPECIFIED: ICD-10-CM

## 2022-04-19 LAB
ALBUMIN SERPL ELPH-MCNC: 4.3 G/DL — SIGNIFICANT CHANGE UP (ref 3.3–5)
ALP SERPL-CCNC: 96 U/L — SIGNIFICANT CHANGE UP (ref 40–120)
ALT FLD-CCNC: 39 U/L — SIGNIFICANT CHANGE UP (ref 10–45)
ANION GAP SERPL CALC-SCNC: 12 MMOL/L — SIGNIFICANT CHANGE UP (ref 5–17)
AST SERPL-CCNC: 23 U/L — SIGNIFICANT CHANGE UP (ref 10–40)
BASOPHILS # BLD AUTO: 0.05 K/UL — SIGNIFICANT CHANGE UP (ref 0–0.2)
BASOPHILS NFR BLD AUTO: 0.8 % — SIGNIFICANT CHANGE UP (ref 0–2)
BILIRUB SERPL-MCNC: 0.6 MG/DL — SIGNIFICANT CHANGE UP (ref 0.2–1.2)
BUN SERPL-MCNC: 14 MG/DL — SIGNIFICANT CHANGE UP (ref 7–23)
CALCIUM SERPL-MCNC: 9.3 MG/DL — SIGNIFICANT CHANGE UP (ref 8.4–10.5)
CHLORIDE SERPL-SCNC: 103 MMOL/L — SIGNIFICANT CHANGE UP (ref 96–108)
CO2 SERPL-SCNC: 23 MMOL/L — SIGNIFICANT CHANGE UP (ref 22–31)
CREAT SERPL-MCNC: 1.05 MG/DL — SIGNIFICANT CHANGE UP (ref 0.5–1.3)
EGFR: 76 ML/MIN/1.73M2 — SIGNIFICANT CHANGE UP
EOSINOPHIL # BLD AUTO: 0.54 K/UL — HIGH (ref 0–0.5)
EOSINOPHIL NFR BLD AUTO: 8.2 % — HIGH (ref 0–6)
GLUCOSE SERPL-MCNC: 104 MG/DL — HIGH (ref 70–99)
HCT VFR BLD CALC: 47.5 % — SIGNIFICANT CHANGE UP (ref 39–50)
HGB BLD-MCNC: 16 G/DL — SIGNIFICANT CHANGE UP (ref 13–17)
IMM GRANULOCYTES NFR BLD AUTO: 0.5 % — SIGNIFICANT CHANGE UP (ref 0–1.5)
LYMPHOCYTES # BLD AUTO: 1.67 K/UL — SIGNIFICANT CHANGE UP (ref 1–3.3)
LYMPHOCYTES # BLD AUTO: 25.4 % — SIGNIFICANT CHANGE UP (ref 13–44)
MCHC RBC-ENTMCNC: 29.8 PG — SIGNIFICANT CHANGE UP (ref 27–34)
MCHC RBC-ENTMCNC: 33.7 GM/DL — SIGNIFICANT CHANGE UP (ref 32–36)
MCV RBC AUTO: 88.5 FL — SIGNIFICANT CHANGE UP (ref 80–100)
MONOCYTES # BLD AUTO: 0.61 K/UL — SIGNIFICANT CHANGE UP (ref 0–0.9)
MONOCYTES NFR BLD AUTO: 9.3 % — SIGNIFICANT CHANGE UP (ref 2–14)
NEUTROPHILS # BLD AUTO: 3.68 K/UL — SIGNIFICANT CHANGE UP (ref 1.8–7.4)
NEUTROPHILS NFR BLD AUTO: 55.8 % — SIGNIFICANT CHANGE UP (ref 43–77)
NRBC # BLD: 0 /100 WBCS — SIGNIFICANT CHANGE UP (ref 0–0)
NT-PROBNP SERPL-SCNC: 22 PG/ML — SIGNIFICANT CHANGE UP (ref 0–300)
PLATELET # BLD AUTO: 224 K/UL — SIGNIFICANT CHANGE UP (ref 150–400)
POTASSIUM SERPL-MCNC: 4.4 MMOL/L — SIGNIFICANT CHANGE UP (ref 3.5–5.3)
POTASSIUM SERPL-SCNC: 4.4 MMOL/L — SIGNIFICANT CHANGE UP (ref 3.5–5.3)
PROT SERPL-MCNC: 7.5 G/DL — SIGNIFICANT CHANGE UP (ref 6–8.3)
RBC # BLD: 5.37 M/UL — SIGNIFICANT CHANGE UP (ref 4.2–5.8)
RBC # FLD: 12.6 % — SIGNIFICANT CHANGE UP (ref 10.3–14.5)
SARS-COV-2 RNA SPEC QL NAA+PROBE: SIGNIFICANT CHANGE UP
SODIUM SERPL-SCNC: 138 MMOL/L — SIGNIFICANT CHANGE UP (ref 135–145)
TROPONIN T, HIGH SENSITIVITY RESULT: 8 NG/L — SIGNIFICANT CHANGE UP (ref 0–51)
TROPONIN T, HIGH SENSITIVITY RESULT: <6 NG/L — SIGNIFICANT CHANGE UP (ref 0–51)
WBC # BLD: 6.58 K/UL — SIGNIFICANT CHANGE UP (ref 3.8–10.5)
WBC # FLD AUTO: 6.58 K/UL — SIGNIFICANT CHANGE UP (ref 3.8–10.5)

## 2022-04-19 PROCEDURE — 93010 ELECTROCARDIOGRAM REPORT: CPT

## 2022-04-19 PROCEDURE — 99152 MOD SED SAME PHYS/QHP 5/>YRS: CPT

## 2022-04-19 PROCEDURE — 99285 EMERGENCY DEPT VISIT HI MDM: CPT

## 2022-04-19 PROCEDURE — 92928 PRQ TCAT PLMT NTRAC ST 1 LES: CPT | Mod: LD

## 2022-04-19 PROCEDURE — 93458 L HRT ARTERY/VENTRICLE ANGIO: CPT | Mod: 26,59

## 2022-04-19 PROCEDURE — 71046 X-RAY EXAM CHEST 2 VIEWS: CPT | Mod: 26

## 2022-04-19 RX ORDER — CLOPIDOGREL BISULFATE 75 MG/1
75 TABLET, FILM COATED ORAL DAILY
Refills: 0 | Status: DISCONTINUED | OUTPATIENT
Start: 2022-04-19 | End: 2022-04-20

## 2022-04-19 RX ORDER — CILOSTAZOL 100 MG/1
1 TABLET ORAL
Qty: 0 | Refills: 0 | DISCHARGE

## 2022-04-19 RX ORDER — ATORVASTATIN CALCIUM 80 MG/1
40 TABLET, FILM COATED ORAL AT BEDTIME
Refills: 0 | Status: DISCONTINUED | OUTPATIENT
Start: 2022-04-19 | End: 2022-04-20

## 2022-04-19 RX ORDER — CILOSTAZOL 100 MG/1
50 TABLET ORAL
Refills: 0 | Status: DISCONTINUED | OUTPATIENT
Start: 2022-04-19 | End: 2022-04-20

## 2022-04-19 RX ORDER — METOPROLOL TARTRATE 50 MG
1 TABLET ORAL
Qty: 0 | Refills: 0 | DISCHARGE

## 2022-04-19 RX ORDER — LANOLIN ALCOHOL/MO/W.PET/CERES
0 CREAM (GRAM) TOPICAL
Qty: 0 | Refills: 0 | DISCHARGE

## 2022-04-19 RX ORDER — ACETAMINOPHEN 500 MG
650 TABLET ORAL ONCE
Refills: 0 | Status: COMPLETED | OUTPATIENT
Start: 2022-04-19 | End: 2022-04-19

## 2022-04-19 RX ORDER — LANOLIN ALCOHOL/MO/W.PET/CERES
1 CREAM (GRAM) TOPICAL
Qty: 0 | Refills: 0 | DISCHARGE

## 2022-04-19 RX ORDER — METOPROLOL TARTRATE 50 MG
25 TABLET ORAL DAILY
Refills: 0 | Status: DISCONTINUED | OUTPATIENT
Start: 2022-04-19 | End: 2022-04-20

## 2022-04-19 RX ORDER — CHOLECALCIFEROL (VITAMIN D3) 125 MCG
0 CAPSULE ORAL
Qty: 0 | Refills: 0 | DISCHARGE

## 2022-04-19 RX ORDER — SODIUM CHLORIDE 9 MG/ML
1000 INJECTION INTRAMUSCULAR; INTRAVENOUS; SUBCUTANEOUS
Refills: 0 | Status: DISCONTINUED | OUTPATIENT
Start: 2022-04-19 | End: 2022-04-20

## 2022-04-19 RX ORDER — ASPIRIN/CALCIUM CARB/MAGNESIUM 324 MG
81 TABLET ORAL DAILY
Refills: 0 | Status: DISCONTINUED | OUTPATIENT
Start: 2022-04-19 | End: 2022-04-20

## 2022-04-19 RX ORDER — CLOPIDOGREL BISULFATE 75 MG/1
600 TABLET, FILM COATED ORAL ONCE
Refills: 0 | Status: COMPLETED | OUTPATIENT
Start: 2022-04-19 | End: 2022-04-19

## 2022-04-19 RX ORDER — LANOLIN ALCOHOL/MO/W.PET/CERES
5 CREAM (GRAM) TOPICAL AT BEDTIME
Refills: 0 | Status: DISCONTINUED | OUTPATIENT
Start: 2022-04-19 | End: 2022-04-20

## 2022-04-19 RX ORDER — FAMOTIDINE 10 MG/ML
1 INJECTION INTRAVENOUS
Qty: 0 | Refills: 0 | DISCHARGE

## 2022-04-19 RX ADMIN — Medication 650 MILLIGRAM(S): at 22:00

## 2022-04-19 RX ADMIN — ATORVASTATIN CALCIUM 40 MILLIGRAM(S): 80 TABLET, FILM COATED ORAL at 21:16

## 2022-04-19 RX ADMIN — Medication 650 MILLIGRAM(S): at 22:30

## 2022-04-19 RX ADMIN — CILOSTAZOL 50 MILLIGRAM(S): 100 TABLET ORAL at 21:16

## 2022-04-19 RX ADMIN — CLOPIDOGREL BISULFATE 600 MILLIGRAM(S): 75 TABLET, FILM COATED ORAL at 14:02

## 2022-04-19 RX ADMIN — SODIUM CHLORIDE 230 MILLILITER(S): 9 INJECTION INTRAMUSCULAR; INTRAVENOUS; SUBCUTANEOUS at 20:39

## 2022-04-19 RX ADMIN — Medication 5 MILLIGRAM(S): at 21:16

## 2022-04-19 NOTE — ED ADULT NURSE NOTE - CARDIO ASSESSMENT
"PSYCHIATRY PROGRESS NOTE    Date of Service:  3/21/2019      CPT code: 27933       Total Face to Face Time:  25 min of which  >50% was spent in counseling and coordination of care      Current Medications:   Current Outpatient Medications   Medication Sig   â¢ loxapine (LOXITANE) 5 MG capsule TAKE 1 TO 3 CAPSULES BY MOUTH AT BEDTIME   â¢ traZODone (DESYREL) 50 MG tablet 1-2 tabs at bedtime   â¢ lithium (LITHOBID) 300 MG CR tablet Take 3 tablets by mouth at bedtime. â¢ lamoTRIgine (LAMICTAL) 150 MG tablet 1 tab at bedtime   â¢ lisdexamfetamine (VYVANSE) 30 MG capsule Take 1 capsule by mouth every morning. â¢ lisdexamfetamine (VYVANSE) 20 MG capsule 1 tablet every noon   â¢ cholecalciferol (VITAMIN D3) 1000 UNITS tablet Take 4,000 Units by mouth daily. â¢ Norethin Ace-Eth Estrad-FE (LORENA 24 FE PO)    â¢ clindamycin (CLEOCIN) 300 MG capsule Take 1 capsule by mouth 4 times daily. â¢ triamcinolone (ARISTOCORT) 0.1 % ointment Apply a small amount to affected area twice daily. â¢ sumatriptan (IMITREX) 100 MG tablet Take 1 tablet by mouth as needed for Migraine. Take 1 tablet by mouth at onset of migraine. May repeat after 2 hours if needed. No current facility-administered medications for this visit. Last Visit med changes: none    Chief Complaint: ""Follow up. Hettie Lank Hettie Lank \""    Interval History of Present Illness:   Nohemi Garner is a(n) 29year old  female who presented for a follow up appointment. Describes her mood lately (past 2 weeks) as, \"" fine, I think the same. ..normal feelings. \"" Pt rates their mood (for the past 2 weeks, on average) at a 7 out of 10 if 10 is happy/content and 0 is the most depressed. Focus and concentration are \"" fine, I think \"" on the current stimulant regimen. Notes she is sleeping well. Getting approximately 7-8 hours of sleep each night. Anxiety level is \""stressed a lot. \"" Main stressors are work life balance. 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  Pt rates their anxiety level " "(for the past 2 weeks, on average) at a 3 out of 10 if 10 is the highest anxiety level and 0 is no anxiety. Denies any suicidal ideation, intent, or plan. Denies any paranoia, hallucinations or other symptoms of psychosis. Denies any symptoms of kaykay or hypomania. No homicidal ideation. No additional concerns or complaints. Medication Side Effects: none    Psychiatric ROS: the following were not present: kaykay, psychotic symptoms (AVH), or SI/HI. Past History/Family History/Social History Update:  Past psychiatric, family, and social history reviewed in psychiatric evaluation. No changes. Medical ROS: (10 + systems)  General ROS: denies fever/sweats, chills, malaise, fatigue  Eyes, ears, nose, throat ROS: denies frequent nose bleeds, sinus pain, stuffy ears, ear pain, ringing in ears, pain with swallowing  Heme/Lymph ROS: denies easy bruising/bleeding, lumps or bumps   Respiratory ROS: denies shortness of breath, wheezing, coughing   Cardiovascular ROS: denies chest pain, palpitations, history of arrhythmias, decrease in exercise tolerance  Gastrointestinal ROS: denies abdominal pain, nausea, vomiting, changes in stool patterns  Genital/urinary ROS: denies dysuria, change in frequency, hesitancy  Musculoskeletal ROS: denies muscle/joint pain  Neurological ROS: denies headaches, dizziness, weakness, tremors, seizures  Endocrinological ROS: denies cold/heat intolerance, hair loss, amenorrhea or irregular menses, polyuria, polyphagia, polydipsia    All other systems reviewed and are negative      Mental Status Exam:    Appearance/General: Casually dressed, well groomed, NAD   Behavior/attitude: Cooperative, pleasant   Eye contact: Good   Gait: Normal   Psychomotor activity: No psychomotor agitation or retardation    Speech: Normal rate, rhythm, and volume   Thought content: No AVH or other symptoms of psychosis   Thought process: Logical, linear, goal-directed    Mood: ""fine, I think the same. ..normal " "feelings""   Affect: Wide range, near euthymic   Suicidality: Denies   Homicidality: Denies   Insight/Judgment: Fair    Sensorium: Alert and oriented to person, place, date   Attention/concentration: Grossly intact   Memory/cognition: Intact based on clinical impression         MEDICAL DECISION MAKING  Complexity of medical decision making: MODERATE    Assessment/Diagnosis:      Axis I:Â Mood disorder;Â ADHD, combined type;Â Â generalized anxiety disorder; Â insomnia, not otherwise specified  Axis II: Borderline personality disorderÂ       Recommendations/Plan:    Mood disorder--overall improved  Medication changes: none      ADHD--Improved  Medication changes: none      RANDOLPH--improved  Medication changes: none    Insomnia-improved  Medication changes: none    Borderline personality disorder: playing a significant role in the clinical picture  Medication changes: none. Continue to encourage dbt skills and psychotherapy        Continue all other medications at current dosages. --Labs--none warranted/needed at this time        - RTC in 2 months or earlier PRN    Education:    1. Discussed diagnosis, symptoms, treatment, and follow-up plan with the patient. Patient verbalized understanding and listened/asked questions. No barriers identified. 2. Explained the risks, benefits, side effects, and potential adverse effects of the medications. Patient expressed understanding and consented to medication(s). 3. Patient aware of emergency services:  Yes - Call 911 or head to closest ER if in crisis.        Britany Alvarez MD, FAPA  " ---

## 2022-04-19 NOTE — ED ADULT TRIAGE NOTE - CHIEF COMPLAINT QUOTE
coronary stents 5 yrs ago. Eval in office yesterday. Advised by cardiologist yesterday to come to ER to have stents evaluated

## 2022-04-19 NOTE — ASU PATIENT PROFILE, ADULT - FALL HARM RISK - UNIVERSAL INTERVENTIONS
Bed in lowest position, wheels locked, appropriate side rails in place/Call bell, personal items and telephone in reach/Instruct patient to call for assistance before getting out of bed or chair/Non-slip footwear when patient is out of bed/[place] to call system/Physically safe environment - no spills, clutter or unnecessary equipment/Purposeful Proactive Rounding/Room/bathroom lighting operational, light cord in reach

## 2022-04-19 NOTE — ED ADULT NURSE NOTE - OBJECTIVE STATEMENT
70 y/o M A&Ox4 PMHx CAD c/c referred by his cardiologist to f/u for eval at ED due to fatigue x1-2 months. Cardiologist wants pt to come here today check out his stents. Pt stated that this morning he has a HA in the occipital region, Denies cp/vision changes/n/v/d/dizziness. VSS. NAD. MD eval done.

## 2022-04-19 NOTE — H&P CARDIOLOGY - HISTORY OF PRESENT ILLNESS
71M ambulatory hx of HTN, HLD, ELLIOTT on CPAP, diet-controlled DM, CAD prior NSTEMI s/p PCI with YARIEL to pLAD and OM1. Preserved LV function, now presents to ED c/o dyspnea, worse w/ exertion and intermittent chest pain              71M ambulatory hx of HTN, HLD, ELLIOTT on CPAP, diet-controlled DM, CAD prior NSTEMI s/p PCI with YARIEL to pLAD and OM1. Preserved LV function, now presents to ED c/o dyspnea, worse w/ exertion and intermittent chest pain. Seen & evaluated by Dr Rosas & now recommends for [facility].

## 2022-04-19 NOTE — ED PROVIDER NOTE - ATTENDING CONTRIBUTION TO CARE
Sandeep Montes MD, FACEP: In this physician's medical judgement based on clinical history and physical exam: Patient with chest pain and shortness of breath.  will get iv, cbc, cmp, pt/inr, ce, ekg, cardiac monitor  Will follow up on labs, analgesia, imaging, reassess and disposition to the inpatient team as clinically indicated.  *The above represents an initial assessment/impression. Please refer to my progress notes below for potential changes in patient clinical course*   Patient endorsed to Dr. Rosas' service at the time of admission. Based on patient's history and physical exam, as well as the results of today's workup, I feel that patient warrants admission to the hospital for further workup/evaluation and continued management. I discussed the findings of today's workup with the patient and addressed the patient's questions and concerns. The patient was agreeable with admission. Our team spoke with the inpatient receiving team who accepted the patient for admission and subsequently took over the patient's care at the time of admission. The receiving team will follow up on pending labs, analgesia, any clinical imaging results, ancillary findings, reassess, and disposition as clinically indicated. Details of patient and plan conveyed to receiving physician team and conveyed back for understanding. There were no questions at this time about the patient's status, disposition, and plan. Patient's care to be taken over by receiving physician team at this time, all decisions regarding the progression of care will be made at their discretion.

## 2022-04-19 NOTE — ED PROVIDER NOTE - PROGRESS NOTE DETAILS
Junior, PGY3 - cardiology consulted - will come see pt Ford, PGY3 - spoke w/ cardiology, pt to [facility] for cath, admit Dr. Rosas

## 2022-04-19 NOTE — ED PROVIDER NOTE - CLINICAL SUMMARY MEDICAL DECISION MAKING FREE TEXT BOX
72 y/o M PMH HLD CAD s/p 3 stents (5 years ago) on aspirin presents to ED c/o dyspnea, worse w/ exertion and intermittent chest pain - denies today - over the last 1-2 months. concern for ACS, CHF, stent complication, pleural effusion. plan labs trop ekg cxr cardiology consult

## 2022-04-19 NOTE — CHART NOTE - NSCHARTNOTEFT_GEN_A_CORE
Removal of Femoral Sheath    Pulses in the right lower extremity are palpable. The patient was placed in the supine position. The insertion site was identified and the sutures were removed per protocol.  The 6 Dutch femoral sheath was then removed. Direct pressure was applied for 20 minutes.     Monitoring of the right groin and both lower extremities including neuro-vascular checks and vital signs every 15 minutes x 4, then every 30 minutes x 2, then every 1 hour was ordered.    Complications: None/Other    Comments: Patient clinically stable and in NAD      Radha Morrow PA-C

## 2022-04-19 NOTE — ED PROVIDER NOTE - OBJECTIVE STATEMENT
72 y/o M PMH HLD CAD s/p 3 stents on aspirin presents to ED c/o dyspnea, worse w/ exertion and intermittent chest pain - denies today - over the last 1-2 months. Pt states he saw Dr. Rosas (cardiology) last week and referred to hospital to evaluate stents. Pt denies cp or sob now. Denies f/c, cough, congestion, abd pain, n/v/d, dysuria, hematuria.

## 2022-04-20 ENCOUNTER — TRANSCRIPTION ENCOUNTER (OUTPATIENT)
Age: 72
End: 2022-04-20

## 2022-04-20 VITALS
RESPIRATION RATE: 17 BRPM | OXYGEN SATURATION: 98 % | HEART RATE: 78 BPM | SYSTOLIC BLOOD PRESSURE: 129 MMHG | DIASTOLIC BLOOD PRESSURE: 73 MMHG | TEMPERATURE: 98 F

## 2022-04-20 DIAGNOSIS — I25.10 ATHEROSCLEROTIC HEART DISEASE OF NATIVE CORONARY ARTERY WITHOUT ANGINA PECTORIS: ICD-10-CM

## 2022-04-20 PROCEDURE — C1887: CPT

## 2022-04-20 PROCEDURE — C1725: CPT

## 2022-04-20 PROCEDURE — 71046 X-RAY EXAM CHEST 2 VIEWS: CPT

## 2022-04-20 PROCEDURE — C1769: CPT

## 2022-04-20 PROCEDURE — 93005 ELECTROCARDIOGRAM TRACING: CPT

## 2022-04-20 PROCEDURE — 93458 L HRT ARTERY/VENTRICLE ANGIO: CPT | Mod: 59

## 2022-04-20 PROCEDURE — 85025 COMPLETE CBC W/AUTO DIFF WBC: CPT

## 2022-04-20 PROCEDURE — U0005: CPT

## 2022-04-20 PROCEDURE — C1894: CPT

## 2022-04-20 PROCEDURE — 83880 ASSAY OF NATRIURETIC PEPTIDE: CPT

## 2022-04-20 PROCEDURE — 99152 MOD SED SAME PHYS/QHP 5/>YRS: CPT

## 2022-04-20 PROCEDURE — 36415 COLL VENOUS BLD VENIPUNCTURE: CPT

## 2022-04-20 PROCEDURE — U0003: CPT

## 2022-04-20 PROCEDURE — C9600: CPT | Mod: LD

## 2022-04-20 PROCEDURE — 80053 COMPREHEN METABOLIC PANEL: CPT

## 2022-04-20 PROCEDURE — 84484 ASSAY OF TROPONIN QUANT: CPT

## 2022-04-20 PROCEDURE — C1874: CPT

## 2022-04-20 PROCEDURE — 99285 EMERGENCY DEPT VISIT HI MDM: CPT | Mod: 25

## 2022-04-20 PROCEDURE — 99153 MOD SED SAME PHYS/QHP EA: CPT

## 2022-04-20 RX ORDER — CLOPIDOGREL BISULFATE 75 MG/1
1 TABLET, FILM COATED ORAL
Qty: 90 | Refills: 4
Start: 2022-04-20 | End: 2023-07-13

## 2022-04-20 RX ORDER — CLOPIDOGREL BISULFATE 75 MG/1
1 TABLET, FILM COATED ORAL
Qty: 0 | Refills: 0 | DISCHARGE

## 2022-04-20 RX ORDER — ATORVASTATIN CALCIUM 80 MG/1
1 TABLET, FILM COATED ORAL
Qty: 30 | Refills: 2
Start: 2022-04-20 | End: 2022-07-18

## 2022-04-20 RX ORDER — ASPIRIN/CALCIUM CARB/MAGNESIUM 324 MG
1 TABLET ORAL
Qty: 0 | Refills: 0 | DISCHARGE

## 2022-04-20 RX ORDER — ASPIRIN/CALCIUM CARB/MAGNESIUM 324 MG
1 TABLET ORAL
Qty: 90 | Refills: 3
Start: 2022-04-20 | End: 2023-04-14

## 2022-04-20 RX ORDER — ASPIRIN/CALCIUM CARB/MAGNESIUM 324 MG
1 TABLET ORAL
Qty: 30 | Refills: 0
Start: 2022-04-20 | End: 2022-05-19

## 2022-04-20 RX ORDER — ATORVASTATIN CALCIUM 80 MG/1
1 TABLET, FILM COATED ORAL
Qty: 30 | Refills: 0
Start: 2022-04-20 | End: 2022-05-19

## 2022-04-20 RX ADMIN — CLOPIDOGREL BISULFATE 75 MILLIGRAM(S): 75 TABLET, FILM COATED ORAL at 05:56

## 2022-04-20 RX ADMIN — Medication 25 MILLIGRAM(S): at 05:56

## 2022-04-20 RX ADMIN — Medication 81 MILLIGRAM(S): at 05:55

## 2022-04-20 NOTE — DISCHARGE NOTE PROVIDER - NSDCCPCAREPLAN_GEN_ALL_CORE_FT
PRINCIPAL DISCHARGE DIAGNOSIS  Diagnosis: CAD (coronary atherosclerotic disease)  Assessment and Plan of Treatment: You have history of Coronary Artery Disease. You were evaluated at the hospital and had a cardiac cath via your Left Femoral Artery. Stents were placed to help open up your artey. Please continue to take your home medications, Aspirin and Plavix as prescribed. Please avoid heavy lifting, strenuous activity, bending, straining, or unnecessary stair climbing for 2 weeks. No driving for 2 days. You may shower 24 hours following the procedure but avoid baths/swimming for 1 week. Check your groin site for bleeding and/or swelling daily following procedure and call your doctor immediately if it occurs or if you experience increased pain at the site. Please follow up with your cardiologist in 1 week. You may call [facility] if you have any questions/concerns regarding your procedure [phone]

## 2022-04-20 NOTE — DISCHARGE NOTE NURSING/CASE MANAGEMENT/SOCIAL WORK - PATIENT PORTAL LINK FT
You can access the FollowMyHealth Patient Portal offered by Health system by registering at the following website: [email or web address]. By joining [facility]’s FollowMyHealth portal, you will also be able to view your health information using other applications (apps) compatible with our system.

## 2022-04-20 NOTE — PROGRESS NOTE ADULT - ASSESSMENT
Patient is a 72 y/o M hx of HTN, HLD, ELLIOTT on CPAP, diet-controlled DM, CAD prior NSTEMI s/p PCI with YARIEL to pLAD and OM1. Preserved LV function, now presents to ED c/o dyspnea, worse w/ exertion and intermittent chest pain. Seen & evaluated by Dr Rosas & now recommends for [facility].

## 2022-04-20 NOTE — DISCHARGE NOTE PROVIDER - NSDCFUSCHEDAPPT_GEN_ALL_CORE_FT
DEBBIE ANDRADE ; 05/02/2022 ; NPP Rheum [address] DEBBIE Marsh ; 05/04/2022 ; NPP Med Pulm [facility]  DEBBIE ANDRADE ; 07/07/2022 ; NPP Cardio [address]

## 2022-04-20 NOTE — DISCHARGE NOTE PROVIDER - NSDCFUADDINST_GEN_ALL_CORE_FT
No heavy lifting, strenuous activity, bending, straining, or unnecessary stair climbing for 2 weeks. No driving for 2 days. You may shower 24 hours following the procedure but avoid baths/swimming for 1 week. Check your groin site for bleeding and/or swelling daily following procedure and call your doctor immediately if it occurs or if you experience increased pain at the site. Follow up with your cardiologist in 1 week. You may call [facility] if you have any questions/concerns regarding your procedure [phone].

## 2022-04-20 NOTE — DISCHARGE NOTE PROVIDER - HOSPITAL COURSE
HPI:  Patient is a 70 y/o M hx of HTN, HLD, ELLIOTT on CPAP, diet-controlled DM, CAD prior NSTEMI s/p PCI with YARIEL to pLAD and OM1. Preserved LV function, now presents to ED c/o dyspnea, worse w/ exertion and intermittent chest pain. Seen & evaluated by Dr Rosas & now recommends for [facility].      4/20/2022:  Patient s/p LHC YARIEL x1 Diag via RFA. Will c/w A/P. Patient is medically cleared for discharge with close cardiology follow up if patient and site is stable.

## 2022-04-20 NOTE — DISCHARGE NOTE PROVIDER - CARE PROVIDER_API CALL
Nayan Rosas (MD)  Cardiovascular Disease; Interventional Cardiology  [address] [place] [address]  Phone: [phone]  Fax: [phone]  Follow Up Time: 1 week   right normal/left normal

## 2022-04-20 NOTE — PROGRESS NOTE ADULT - PROBLEM SELECTOR PLAN 1
- s/p LHC via RFA.   - Site stable; wthout hematoma, positive pulses  - Continue Aspirin and Plavix  - Continue Atorvastatin  - Continue DASH diet

## 2022-04-20 NOTE — PROGRESS NOTE ADULT - SUBJECTIVE AND OBJECTIVE BOX
Subjective/Observations: No acute events overnight. Patient currently in NAD. Patient s/p C YARIEL x1 Diag via RFA. Site is stable with no hematoma, active bleed or swelling.  Dressing is clean/dry/intact.  DP pulse is palpable.  Patient denies pain, numbness, tingling, CP, SOB. VSS.      REVIEW OF SYSTEMS: All other review of systems is negative unless indicated above    MEDICATIONS  (STANDING):  aspirin enteric coated 81 milliGRAM(s) Oral daily  atorvastatin 40 milliGRAM(s) Oral at bedtime  cilostazol 50 milliGRAM(s) Oral <User Schedule>  clopidogrel Tablet 75 milliGRAM(s) Oral daily  melatonin 5 milliGRAM(s) Oral at bedtime  metoprolol succinate ER 25 milliGRAM(s) Oral daily  sodium chloride 0.9%. 1000 milliLiter(s) (230 mL/Hr) IV Continuous <Continuous>    MEDICATIONS  (PRN):      Allergies    No Known Allergies    Intolerances      Vital Signs Last 24 Hrs  T(C): 36.3 (20 Apr 2022 04:45), Max: 37.2 (19 Apr 2022 08:27)  T(F): 97.4 (20 Apr 2022 04:45), Max: 98.9 (19 Apr 2022 08:27)  HR: 60 (20 Apr 2022 04:45) (53 - 82)  BP: 97/67 (20 Apr 2022 04:45) (95/56 - 139/92)  BP(mean): 78 (20 Apr 2022 04:45) (69 - 106)  RR: 17 (20 Apr 2022 04:45) (15 - 19)  SpO2: 94% (20 Apr 2022 04:45) (93% - 98%)          I&O's Summary    19 Apr 2022 07:01  -  20 Apr 2022 05:15  --------------------------------------------------------  IN: 0 mL / OUT: 400 mL / NET: -400 mL      Weight (kg): 79.4 (04-19 @ 13:13)    PHYSICAL EXAM:  General: WN/WD NAD  Neurology: Awake, nonfocal, MUÑIZ x 4  Respiratory: CTA B/L, No wheezing, rales, rhonchi  CV: RRR, S1S2, no murmurs, rubs or gallops  Abdominal: Soft, NT, ND +BS,   Extremities: No edema, + peripheral pulses  Skin: No Rashes, Hematoma, Ecchymosis  Psych: Oriented x 3, normal affect      LABS: All Labs Reviewed:                        16.0   6.58  )-----------( 224      ( 19 Apr 2022 08:47 )             47.5     19 Apr 2022 08:47    138    |  103    |  14     ----------------------------<  104    4.4     |  23     |  1.05     Ca    9.3        19 Apr 2022 08:47    TPro  7.5    /  Alb  4.3    /  TBili  0.6    /  DBili  x      /  AST  23     /  ALT  39     /  AlkPhos  96     19 Apr 2022 08:47

## 2022-04-20 NOTE — DISCHARGE NOTE PROVIDER - NSDCCPTREATMENT_GEN_ALL_CORE_FT
PRINCIPAL PROCEDURE  Procedure: Left heart cardiac cath  Findings and Treatment: Joshua x1 Diag via RFA. A/P

## 2022-04-20 NOTE — DISCHARGE NOTE PROVIDER - NSDCMRMEDTOKEN_GEN_ALL_CORE_FT
Aspirin Enteric Coated 81 mg oral delayed release tablet: 1 tab(s) orally once a day  atorvastatin 40 mg oral tablet: 1 tab(s) orally once a day (at bedtime) MDD:1  cilostazol 50 mg oral tablet: 1 tab(s) orally once a day    note: rx has 2 times per day as per pt once daily  Melatonin 5 mg oral tablet: 1 tab(s) orally once a day (at bedtime)  metoprolol succinate 25 mg oral tablet, extended release: 1 tab(s) orally once a day  Plavix 75 mg oral tablet: 1 tab(s) orally once a day  Vitamin D3:

## 2022-04-20 NOTE — PROGRESS NOTE ADULT - REASON FOR ADMISSION
HPI     NIDDM exam.  Last eye exam 11/29/2017 [facility].  Patient states seems like vision is worse at near and distance.  Decrease night visual acuity.  Update glasses RX.   Patient left glasses today      Last edited by Ana Soriano on 12/7/2018  3:28 PM. (History)            Assessment /Plan     For exam results, see Encounter Report.    Diabetes mellitus type 2 without retinopathy    Dry eyes, bilateral    Bilateral presbyopia      No Background Diabetic Retinopathy    Artificial tears prn    May use OTC glasses.  RTC 1 year  Discussed above and answered questions.                   
PCI

## 2022-04-21 ENCOUNTER — NON-APPOINTMENT (OUTPATIENT)
Age: 72
End: 2022-04-21

## 2022-04-25 ENCOUNTER — NON-APPOINTMENT (OUTPATIENT)
Age: 72
End: 2022-04-25

## 2022-04-25 PROBLEM — R07.89 CHEST PRESSURE: Status: ACTIVE | Noted: 2018-06-10

## 2022-04-25 NOTE — DISCUSSION/SUMMARY
[Patient] : the patient [Risks] : risks [Benefits] : benefits [Alternatives] : alternatives [With Me] : with me [___ Month(s)] : in [unfilled] month(s) [FreeTextEntry1] : 72 y/o man with prior NSTEMI s/p PCI with YARIEL to pLAD and OM1. Preserved LV function. \par CAD - new angina - recc cath\par May remain on asa monotherapy\par HLD- labs with PCP \par \par F/u 6 mo

## 2022-04-25 NOTE — HISTORY OF PRESENT ILLNESS
[FreeTextEntry1] : Ernesto is returning for a f/u\par  \par Notes increasing chest pain and BILL with activity\par No syncope or palpitations\par No falls or blackouts\par No LE edema\par \par

## 2022-04-25 NOTE — CARDIOLOGY SUMMARY
[de-identified] : 4/7/22\par Sinus  Rhythm \par -Anterolateral ST-elevation -repolarization variant. \par \par PROBABLY NORMAL

## 2022-04-27 ENCOUNTER — NON-APPOINTMENT (OUTPATIENT)
Age: 72
End: 2022-04-27

## 2022-04-27 ENCOUNTER — APPOINTMENT (OUTPATIENT)
Dept: DERMATOLOGY | Facility: CLINIC | Age: 72
End: 2022-04-27
Payer: MEDICARE

## 2022-04-27 VITALS — BODY MASS INDEX: 28.19 KG/M2 | WEIGHT: 172 LBS

## 2022-04-27 DIAGNOSIS — R21 RASH AND OTHER NONSPECIFIC SKIN ERUPTION: ICD-10-CM

## 2022-04-27 DIAGNOSIS — L81.4 OTHER MELANIN HYPERPIGMENTATION: ICD-10-CM

## 2022-04-27 DIAGNOSIS — Z87.2 PERSONAL HISTORY OF DISEASES OF THE SKIN AND SUBCUTANEOUS TISSUE: ICD-10-CM

## 2022-04-27 DIAGNOSIS — L70.0 ACNE VULGARIS: ICD-10-CM

## 2022-04-27 PROCEDURE — 99214 OFFICE O/P EST MOD 30 MIN: CPT

## 2022-04-27 NOTE — PHYSICAL EXAM
[FreeTextEntry3] : AAOx3, pleasant, NAD, no visual lymphadenopathy\par hair, scalp, face, nose, eyelids, ears, lips, oropharynx, neck, chest, abdomen, back, right arm, left arm, nails, and hands examined with all normal findings,\par pertinent findings include:\par \par lentigines and melasma on face

## 2022-04-27 NOTE — HISTORY OF PRESENT ILLNESS
[FreeTextEntry1] : spots on face [de-identified] : 71 year old here with lentigines and rashes. \par \par  [id number]

## 2022-04-27 NOTE — ASSESSMENT
[FreeTextEntry1] : lentigines and melasma and acne\par education\par add tretinoin 0.025% cream at bedtime; SED\par sun protection\par consider IPL; discussed at CYP per session

## 2022-04-29 DIAGNOSIS — Z87.898 PERSONAL HISTORY OF OTHER SPECIFIED CONDITIONS: ICD-10-CM

## 2022-04-29 DIAGNOSIS — H61.20 IMPACTED CERUMEN, UNSPECIFIED EAR: ICD-10-CM

## 2022-04-29 DIAGNOSIS — R06.83 SNORING: ICD-10-CM

## 2022-05-02 ENCOUNTER — APPOINTMENT (OUTPATIENT)
Dept: RHEUMATOLOGY | Facility: CLINIC | Age: 72
End: 2022-05-02

## 2022-05-04 ENCOUNTER — APPOINTMENT (OUTPATIENT)
Dept: CARDIOLOGY | Facility: CLINIC | Age: 72
End: 2022-05-04
Payer: MEDICARE

## 2022-05-04 ENCOUNTER — APPOINTMENT (OUTPATIENT)
Dept: PULMONOLOGY | Facility: CLINIC | Age: 72
End: 2022-05-04
Payer: MEDICARE

## 2022-05-04 VITALS
HEIGHT: 65.5 IN | TEMPERATURE: 97.7 F | WEIGHT: 173 LBS | BODY MASS INDEX: 28.48 KG/M2 | OXYGEN SATURATION: 97 % | HEART RATE: 72 BPM | DIASTOLIC BLOOD PRESSURE: 70 MMHG | SYSTOLIC BLOOD PRESSURE: 123 MMHG

## 2022-05-04 VITALS — DIASTOLIC BLOOD PRESSURE: 73 MMHG | OXYGEN SATURATION: 96 % | HEART RATE: 73 BPM | SYSTOLIC BLOOD PRESSURE: 134 MMHG

## 2022-05-04 DIAGNOSIS — F51.12 INSUFFICIENT SLEEP SYNDROME: ICD-10-CM

## 2022-05-04 PROCEDURE — 99214 OFFICE O/P EST MOD 30 MIN: CPT | Mod: 25

## 2022-05-04 PROCEDURE — 93000 ELECTROCARDIOGRAM COMPLETE: CPT

## 2022-05-04 PROCEDURE — 99214 OFFICE O/P EST MOD 30 MIN: CPT

## 2022-05-04 NOTE — PHYSICAL EXAM
[General Appearance - Well Developed] : well developed [Normal Appearance] : normal appearance [General Appearance - Well Nourished] : well nourished [Normal Conjunctiva] : the conjunctiva exhibited no abnormalities [Low Lying Soft Palate] : low lying soft palate [Heart Rate And Rhythm] : heart rate was normal and rhythm regular [Heart Sounds] : normal S1 and S2 [Respiration, Rhythm And Depth] : normal respiratory rhythm and effort [Auscultation Breath Sounds / Voice Sounds] : lungs were clear to auscultation bilaterally [Abnormal Walk] : normal gait [Cyanosis, Localized] : no localized cyanosis [Skin Color & Pigmentation] : normal skin color and pigmentation [] : no rash [No Focal Deficits] : no focal deficits [Oriented To Time, Place, And Person] : oriented to person, place, and time [Impaired Insight] : insight and judgment were intact [Affect] : the affect was normal [Neck Appearance] : the appearance of the neck was normal

## 2022-05-04 NOTE — ASSESSMENT
[FreeTextEntry1] : 72 yo M PMH CAD s/p stents, ELLIOTT on CPAP, preDM, hepatic steatosis, BPH s/p rezum, bladder hematoma, saphenous vein thrombosis, urethral stricture s/p surgery, chronic gastritis, and foot ulcer presenting for follow-up.\par \par #ELLIOTT–patient has 2 sleep studies which show mild obstructive sleep apnea.  Compliance report generated today shows a total use of 29/30 days with an average use of 6 hours and 29 minutes with a Pap range of 4-12 cm H2O.  His AHI on this range was 2.7/h.  Discussed with the patient that his sleep apnea is well controlled on his current setting.  Though he continues to complain of snoring I will increase his range to 7 to 12 cm H2O.\par \par #Daytime sleepiness–patient continues to complain of daytime sleepiness.  We discussed that he should try to increase to total time of sleep that he has.  He indicates that he occasionally needs to use melatonin prior to bedtime due to some anxiety.  Though he may benefit from stimulant therapy, given his cardiac history this is likely contraindicated to some degree.  As his daytime sleepiness does not seem to be interfering with his daily activity, we will hold off on prescribing any stimulant therapy and shows suggest increase total sleep time.\par \par Patient is eligible for a new machine in July 2022.  We will order him a new machine during that time and he will follow-up with me 1 to 2 months after he receives his new machine.

## 2022-05-05 ENCOUNTER — NON-APPOINTMENT (OUTPATIENT)
Age: 72
End: 2022-05-05

## 2022-05-05 NOTE — DISCUSSION/SUMMARY
[Patient] : the patient [Risks] : risks [Benefits] : benefits [Alternatives] : alternatives [With Me] : with me [___ Month(s)] : in [unfilled] month(s) [FreeTextEntry1] : 72 y/o man with prior NSTEMI s/p PCI with YARIEL to pLAD and OM1. Preserved LV function. \par \par \par CAD - s/p repeat D1 stenting. No changes to meds.\par HLD- labs with PCP \par Discussed with daughter and him the importance of DM, and lipid control\par \par F/u 6 mo

## 2022-05-05 NOTE — HISTORY OF PRESENT ILLNESS
[FreeTextEntry1] : Ernesto is returning for a f/u\par  s/p Recent repeat PCI to small Diag\par Feels better \par Chest pain and dyspnea improved\par No syncope or palpitations\par No falls or blackouts\par No LE edema\par \par

## 2022-05-05 NOTE — PHYSICAL EXAM
[Well Developed] : well developed [Well Nourished] : well nourished [No Acute Distress] : no acute distress [Normal Conjunctiva] : normal conjunctiva [Normal Venous Pressure] : normal venous pressure [No Carotid Bruit] : no carotid bruit [Normal S1, S2] : normal S1, S2 [No Murmur] : no murmur [No Rub] : no rub [No Gallop] : no gallop [Clear Lung Fields] : clear lung fields [Good Air Entry] : good air entry [No Respiratory Distress] : no respiratory distress  [Soft] : abdomen soft [Non Tender] : non-tender [No Masses/organomegaly] : no masses/organomegaly [Normal Bowel Sounds] : normal bowel sounds [Normal Gait] : normal gait [No Edema] : no edema [No Cyanosis] : no cyanosis [No Clubbing] : no clubbing [No Varicosities] : no varicosities [No Rash] : no rash [No Skin Lesions] : no skin lesions [Moves all extremities] : moves all extremities [No Focal Deficits] : no focal deficits [Normal Speech] : normal speech [Alert and Oriented] : alert and oriented [Normal memory] : normal memory [de-identified] : +2 radial pulse. No hematoma

## 2022-05-17 ENCOUNTER — NON-APPOINTMENT (OUTPATIENT)
Age: 72
End: 2022-05-17

## 2022-05-23 NOTE — DISCHARGE NOTE NURSING/CASE MANAGEMENT/SOCIAL WORK - BRAND OF FIRST COVID-19 BOOSTER
----- Message from Bossman Jaeger sent at 5/23/2022  9:59 AM CDT -----  Contact: Pt daughter - hourig   Pt daughter is calling to see if the pt can be worked in for an earlier time tomorrow and if not a day this week or next week and can be reached at [phone]//cm/sarah      [employer]

## 2022-07-13 ENCOUNTER — NON-APPOINTMENT (OUTPATIENT)
Age: 72
End: 2022-07-13

## 2022-07-27 NOTE — CURRENT MEDS
[Takes medication as prescribed] : takes [None] : Patient does not have any barriers to medication adherence Qbrexza Counseling:  I discussed with the patient the risks of Qbrexza including but not limited to headache, mydriasis, blurred vision, dry eyes, nasal dryness, dry mouth, dry throat, dry skin, urinary hesitation, and constipation.  Local skin reactions including erythema, burning, stinging, and itching can also occur.

## 2022-07-28 ENCOUNTER — APPOINTMENT (OUTPATIENT)
Dept: DERMATOLOGY | Facility: CLINIC | Age: 72
End: 2022-07-28

## 2022-09-06 ENCOUNTER — NON-APPOINTMENT (OUTPATIENT)
Age: 72
End: 2022-09-06

## 2022-09-08 NOTE — REVIEW OF SYSTEMS
Transplant Surgery [Feeling Fatigued] : feeling fatigued [see HPI] : see HPI [Negative] : Neurological [Dyspnea on exertion] : not dyspnea during exertion [Fever] : no fever [Chills] : no chills [Feeling Poorly] : not feeling poorly [Feeling Tired] : not feeling tired [Recent Weight Gain (___ Lbs)] : no recent weight gain [Recent Weight Loss (___ Lbs)] : no recent weight loss [Eye Pain] : no eye pain [Red Eyes] : eyes not red [Eyesight Problems] : no eyesight problems [Discharge From Eyes] : no purulent discharge from the eyes [Dry Eyes] : no dryness of the eyes [Eyes Itch] : no itching of the eyes [Earache] : no earache [Loss Of Hearing] : no hearing loss [Nosebleeds] : no nosebleeds [Nasal Discharge] : no nasal discharge [Sore Throat] : no sore throat [Hoarseness] : no hoarseness [Heart Rate Is Slow] : the heart rate was not slow [Heart Rate Is Fast] : the heart rate was not fast [Chest Pain] : no chest pain [Palpitations] : no palpitations [Leg Claudication] : no intermittent leg claudication [Lower Ext Edema] : no extremity edema [Shortness Of Breath] : no shortness of breath [Cough] : no cough [Orthopnea] : no orthopnea [Wheezing] : no wheezing [SOB on Exertion] : no shortness of breath during exertion [PND] : no PND [Abdominal Pain] : no abdominal pain [Vomiting] : no vomiting [Constipation] : no constipation [Diarrhea] : no diarrhea [Heartburn] : no heartburn [Melena] : no melena [Dysuria] : no dysuria [Incontinence] : no incontinence [Hesitancy] : no urinary hesitancy [Nocturia] : no nocturia [Genital Lesion] : no genital lesions [Testicular Pain] : no testicular pain [Arthralgias] : no arthralgias [Joint Pain] : no joint pain [Joint Swelling] : no joint swelling [Joint Stiffness] : no joint stiffness [Limb Pain] : no limb pain [Limb Swelling] : no limb swelling [Skin Lesions] : no skin lesions [Skin Wound] : no skin wound [Itching] : no itching [Change In A Mole] : no change in a mole [Dry Skin] : no dry skin [An Unusual Growth] : no unusual growth on the skin [Confused] : no confusion [Convulsions] : no convulsions [Dizziness] : no dizziness [Fainting] : no fainting [Limb Weakness] : no limb weakness [Difficulty Walking] : no difficulty walking [Suicidal] : not suicidal [Sleep Disturbances] : no sleep disturbances [Anxiety] : no anxiety [Depression] : no depression [Change In Personality] : no personality change [Emotional Problems] : no emotional problems [Proptosis] : no proptosis [Hot Flashes] : no hot flashes [Muscle Weakness] : no muscle weakness [Erectile Dysfunction] : no erectile dysfunction [Deepening Of The Voice] : no deepening of the voice [Feelings Of Weakness] : no feelings of weakness [Easy Bleeding] : no tendency for easy bleeding [Easy Bruising] : no tendency for easy bruising [Swollen Glands] : no swollen glands [Swollen Glands In The Neck] : no swollen glands in the neck

## 2022-09-16 ENCOUNTER — APPOINTMENT (OUTPATIENT)
Dept: INTERNAL MEDICINE | Facility: CLINIC | Age: 72
End: 2022-09-16

## 2022-09-16 VITALS
SYSTOLIC BLOOD PRESSURE: 118 MMHG | DIASTOLIC BLOOD PRESSURE: 80 MMHG | WEIGHT: 170 LBS | BODY MASS INDEX: 28.32 KG/M2 | HEART RATE: 64 BPM | HEIGHT: 65 IN | OXYGEN SATURATION: 94 %

## 2022-09-16 DIAGNOSIS — Z86.2 PERSONAL HISTORY OF DISEASES OF THE BLOOD AND BLOOD-FORMING ORGANS AND CERTAIN DISORDERS INVOLVING THE IMMUNE MECHANISM: ICD-10-CM

## 2022-09-16 DIAGNOSIS — R07.0 PAIN IN THROAT: ICD-10-CM

## 2022-09-16 DIAGNOSIS — Z87.438 PERSONAL HISTORY OF OTHER DISEASES OF MALE GENITAL ORGANS: ICD-10-CM

## 2022-09-16 DIAGNOSIS — U07.1 COVID-19: ICD-10-CM

## 2022-09-16 DIAGNOSIS — I65.23 OCCLUSION AND STENOSIS OF BILATERAL CAROTID ARTERIES: ICD-10-CM

## 2022-09-16 DIAGNOSIS — Z00.00 ENCOUNTER FOR GENERAL ADULT MEDICAL EXAMINATION W/OUT ABNORMAL FINDINGS: ICD-10-CM

## 2022-09-16 PROCEDURE — G0444 DEPRESSION SCREEN ANNUAL: CPT

## 2022-09-16 PROCEDURE — 36415 COLL VENOUS BLD VENIPUNCTURE: CPT

## 2022-09-16 PROCEDURE — G0439: CPT

## 2022-09-16 RX ORDER — RIFAXIMIN 550 MG/1
550 TABLET ORAL
Qty: 42 | Refills: 0 | Status: DISCONTINUED | COMMUNITY
Start: 2021-11-15 | End: 2022-09-16

## 2022-09-16 RX ORDER — OFLOXACIN OTIC 3 MG/ML
0.3 SOLUTION AURICULAR (OTIC) TWICE DAILY
Qty: 12 | Refills: 0 | Status: DISCONTINUED | COMMUNITY
Start: 2022-04-18 | End: 2022-09-16

## 2022-09-16 RX ORDER — TAMSULOSIN HYDROCHLORIDE 0.4 MG/1
0.4 CAPSULE ORAL
Qty: 90 | Refills: 3 | Status: DISCONTINUED | COMMUNITY
End: 2022-09-16

## 2022-09-18 NOTE — HISTORY OF PRESENT ILLNESS
[FreeTextEntry1] : Follow up  [de-identified] : Mr. DEBBIE ANDRADE is a 71 year old man with pmhx of CAD w/ 3 stents, ELLIOTT on cpap, prediabetes, hepatic steatosis, Prediabetes, bph recent rezum, hx of bladder hematoma with resolution / left saphenous vein thrombosis, urethtral stricture s/p surgery, chronic gastritis, hx of foot ulcer who presents for a physical. He endorses being in good mood and health. He endorses mild throat discomfort. Advised to follow up with with ENT for further assessments of throat and L ear discomfort/excess ear wax. \par \par

## 2022-09-18 NOTE — ASSESSMENT
[FreeTextEntry1] : 1) HM- Encouraged healthy diet and exercise given endorses fatty liver. Recent colonoscopy this year, patient to send over records. Flu and Pneumovax today.  Labs ordered as below. Patient is seeing cardiologist soon. Urologist records to be faxed over. HCP form provided to patient in the past visit. Patient to follow up with GI, PULM, Card, Derm as needed. Orders and referral provided as below.\par \par 2) Throat discomfort- advised to follow up with ENT. \par \par All patient questions answered today and understood by patient. Patient to follow up if new symptoms, questions, renewals or health concerns.

## 2022-09-18 NOTE — PHYSICAL EXAM
[Well Nourished] : well nourished [Well Developed] : well developed [Normal Sclera/Conjunctiva] : normal sclera/conjunctiva [EOMI] : extraocular movements intact [Normal Outer Ear/Nose] : the outer ears and nose were normal in appearance [Normal Oropharynx] : the oropharynx was normal [No JVD] : no jugular venous distention [No Lymphadenopathy] : no lymphadenopathy [Supple] : supple [Thyroid Normal, No Nodules] : the thyroid was normal and there were no nodules present [No Respiratory Distress] : no respiratory distress  [No Accessory Muscle Use] : no accessory muscle use [Clear to Auscultation] : lungs were clear to auscultation bilaterally [Normal Rate] : normal rate  [Regular Rhythm] : with a regular rhythm [Normal S1, S2] : normal S1 and S2 [No Murmur] : no murmur heard [Pedal Pulses Present] : the pedal pulses are present [No Edema] : there was no peripheral edema [No Extremity Clubbing/Cyanosis] : no extremity clubbing/cyanosis [Soft] : abdomen soft [Non Tender] : non-tender [Non-distended] : non-distended [Normal Bowel Sounds] : normal bowel sounds [Normal Supraclavicular Nodes] : no supraclavicular lymphadenopathy [Normal Posterior Cervical Nodes] : no posterior cervical lymphadenopathy [Normal Anterior Cervical Nodes] : no anterior cervical lymphadenopathy [No CVA Tenderness] : no CVA  tenderness [No Spinal Tenderness] : no spinal tenderness [No Joint Swelling] : no joint swelling [Grossly Normal Strength/Tone] : grossly normal strength/tone [No Rash] : no rash [Coordination Grossly Intact] : coordination grossly intact [No Focal Deficits] : no focal deficits [Normal Gait] : normal gait [Normal Affect] : the affect was normal [Normal Insight/Judgement] : insight and judgment were intact [Comprehensive Foot Exam Normal] : Right and left foot were examined and both feet are normal. No ulcers in either foot. Toes are normal and with full ROM.  Normal tactile sensation with monofilament testing throughout both feet [No Acute Distress] : no acute distress

## 2022-09-18 NOTE — HEALTH RISK ASSESSMENT
[No] : In the past 12 months have you used drugs other than those required for medical reasons? No [No falls in past year] : Patient reported no falls in the past year [0] : 2) Feeling down, depressed, or hopeless: Not at all (0) [Patient reported colonoscopy was normal] : Patient reported colonoscopy was normal [None] : None [With Family] : lives with family [Unemployed] : unemployed [] :  [Feels Safe at Home] : Feels safe at home [Fully functional (bathing, dressing, toileting, transferring, walking, feeding)] : Fully functional (bathing, dressing, toileting, transferring, walking, feeding) [Fully functional (using the telephone, shopping, preparing meals, housekeeping, doing laundry, using] : Fully functional and needs no help or supervision to perform IADLs (using the telephone, shopping, preparing meals, housekeeping, doing laundry, using transportation, managing medications and managing finances) [Smoke Detector] : smoke detector [Carbon Monoxide Detector] : carbon monoxide detector [Seat Belt] :  uses seat belt [Sunscreen] : uses sunscreen [Very Good] : ~his/her~  mood as very good [Never] : Never [PHQ-2 Negative - No further assessment needed] : PHQ-2 Negative - No further assessment needed [de-identified] : ED [de-identified] : Urologist  [de-identified] : Walking [de-identified] : regular  [[id number]] : 0 [Reports changes in hearing] : Reports no changes in hearing [Reports changes in vision] : Reports no changes in vision [Reports normal functional visual acuity (ie: able to read med bottle)] : Reports poor functional visual acuity.  [ColonoscopyDate] : 06/20 [Patient/Caregiver not ready to engage] : , patient/caregiver not ready to engage

## 2022-09-19 LAB
ALBUMIN SERPL ELPH-MCNC: 4.6 G/DL
ALP BLD-CCNC: 82 U/L
ALT SERPL-CCNC: 39 U/L
ANION GAP SERPL CALC-SCNC: 11 MMOL/L
AST SERPL-CCNC: 23 U/L
BASOPHILS # BLD AUTO: 0.04 K/UL
BASOPHILS NFR BLD AUTO: 0.7 %
BILIRUB SERPL-MCNC: 1 MG/DL
BUN SERPL-MCNC: 17 MG/DL
CALCIUM SERPL-MCNC: 9.5 MG/DL
CHLORIDE SERPL-SCNC: 106 MMOL/L
CHOLEST SERPL-MCNC: 102 MG/DL
CO2 SERPL-SCNC: 24 MMOL/L
CREAT SERPL-MCNC: 0.99 MG/DL
EGFR: 81 ML/MIN/1.73M2
EOSINOPHIL # BLD AUTO: 0.27 K/UL
EOSINOPHIL NFR BLD AUTO: 4.5 %
ESTIMATED AVERAGE GLUCOSE: 126 MG/DL
GLUCOSE SERPL-MCNC: 84 MG/DL
HBA1C MFR BLD HPLC: 6 %
HCT VFR BLD CALC: 48.3 %
HDLC SERPL-MCNC: 24 MG/DL
HGB BLD-MCNC: 15.7 G/DL
IMM GRANULOCYTES NFR BLD AUTO: 0.2 %
LDLC SERPL CALC-MCNC: 53 MG/DL
LYMPHOCYTES # BLD AUTO: 1.92 K/UL
LYMPHOCYTES NFR BLD AUTO: 31.7 %
MAN DIFF?: NORMAL
MCHC RBC-ENTMCNC: 30.5 PG
MCHC RBC-ENTMCNC: 32.5 GM/DL
MCV RBC AUTO: 93.8 FL
MONOCYTES # BLD AUTO: 0.5 K/UL
MONOCYTES NFR BLD AUTO: 8.3 %
NEUTROPHILS # BLD AUTO: 3.31 K/UL
NEUTROPHILS NFR BLD AUTO: 54.6 %
NONHDLC SERPL-MCNC: 79 MG/DL
PLATELET # BLD AUTO: 205 K/UL
POTASSIUM SERPL-SCNC: 4 MMOL/L
PROT SERPL-MCNC: 7.1 G/DL
RBC # BLD: 5.15 M/UL
RBC # FLD: 12.8 %
SODIUM SERPL-SCNC: 141 MMOL/L
TRIGL SERPL-MCNC: 129 MG/DL
TSH SERPL-ACNC: 2.04 UIU/ML
WBC # FLD AUTO: 6.05 K/UL

## 2022-09-26 ENCOUNTER — RX RENEWAL (OUTPATIENT)
Age: 72
End: 2022-09-26

## 2022-09-29 ENCOUNTER — APPOINTMENT (OUTPATIENT)
Dept: UROLOGY | Facility: CLINIC | Age: 72
End: 2022-09-29

## 2022-10-06 ENCOUNTER — APPOINTMENT (OUTPATIENT)
Dept: CARDIOLOGY | Facility: CLINIC | Age: 72
End: 2022-10-06

## 2022-10-06 ENCOUNTER — NON-APPOINTMENT (OUTPATIENT)
Age: 72
End: 2022-10-06

## 2022-10-06 VITALS
HEIGHT: 65 IN | WEIGHT: 170 LBS | BODY MASS INDEX: 28.32 KG/M2 | HEART RATE: 73 BPM | SYSTOLIC BLOOD PRESSURE: 107 MMHG | DIASTOLIC BLOOD PRESSURE: 64 MMHG | OXYGEN SATURATION: 95 %

## 2022-10-06 PROCEDURE — 93000 ELECTROCARDIOGRAM COMPLETE: CPT

## 2022-10-06 PROCEDURE — 99214 OFFICE O/P EST MOD 30 MIN: CPT | Mod: 25

## 2022-10-09 NOTE — HISTORY OF PRESENT ILLNESS
[FreeTextEntry1] : Ernesto is returning for a f/u \par \par Feels well\par No Chest pain or dyspnea  \par No syncope or palpitations\par No falls or blackouts\par No LE edema\par \par

## 2022-10-09 NOTE — DISCUSSION/SUMMARY
[Patient] : the patient [Risks] : risks [Benefits] : benefits [Alternatives] : alternatives [With Me] : with me [___ Month(s)] : in [unfilled] month(s) [FreeTextEntry1] : 70 y/o man with prior NSTEMI s/p PCI with YARIEL to pLAD and OM1. Preserved LV function. \par \par \par CAD - s/p repeat D1 stenting. feels well.  No changes to meds.\par HLD- labs with PCP reviewed from Sept. \par \par F/u 6 mo [EKG obtained to assist in diagnosis and management of assessed problem(s)] : EKG obtained to assist in diagnosis and management of assessed problem(s)

## 2022-10-09 NOTE — DISCUSSION/SUMMARY
[Patient] : the patient [Risks] : risks [Benefits] : benefits [Alternatives] : alternatives [With Me] : with me [___ Month(s)] : in [unfilled] month(s) [FreeTextEntry1] : 72 y/o man with prior NSTEMI s/p PCI with YARIEL to pLAD and OM1. Preserved LV function. \par \par \par CAD - s/p repeat D1 stenting. feels well.  No changes to meds.\par HLD- labs with PCP reviewed from Sept. \par \par F/u 6 mo [EKG obtained to assist in diagnosis and management of assessed problem(s)] : EKG obtained to assist in diagnosis and management of assessed problem(s)

## 2022-10-17 ENCOUNTER — APPOINTMENT (OUTPATIENT)
Dept: INTERNAL MEDICINE | Facility: CLINIC | Age: 72
End: 2022-10-17

## 2022-10-17 PROCEDURE — G0008: CPT

## 2022-10-17 PROCEDURE — 90662 IIV NO PRSV INCREASED AG IM: CPT

## 2022-10-18 ENCOUNTER — APPOINTMENT (OUTPATIENT)
Dept: UROLOGY | Facility: CLINIC | Age: 72
End: 2022-10-18

## 2022-11-15 ENCOUNTER — APPOINTMENT (OUTPATIENT)
Dept: VASCULAR SURGERY | Facility: CLINIC | Age: 72
End: 2022-11-15

## 2022-12-12 ENCOUNTER — RX RENEWAL (OUTPATIENT)
Age: 72
End: 2022-12-12

## 2023-01-10 ENCOUNTER — APPOINTMENT (OUTPATIENT)
Dept: INTERNAL MEDICINE | Facility: CLINIC | Age: 73
End: 2023-01-10
Payer: MEDICARE

## 2023-01-10 VITALS
SYSTOLIC BLOOD PRESSURE: 124 MMHG | HEIGHT: 65 IN | OXYGEN SATURATION: 97 % | BODY MASS INDEX: 27.49 KG/M2 | DIASTOLIC BLOOD PRESSURE: 64 MMHG | WEIGHT: 165 LBS | TEMPERATURE: 97.3 F | HEART RATE: 82 BPM

## 2023-01-10 DIAGNOSIS — I77.1 STRICTURE OF ARTERY: ICD-10-CM

## 2023-01-10 DIAGNOSIS — I73.9 PERIPHERAL VASCULAR DISEASE, UNSPECIFIED: ICD-10-CM

## 2023-01-10 DIAGNOSIS — L98.499 STRICTURE OF ARTERY: ICD-10-CM

## 2023-01-10 PROCEDURE — 99214 OFFICE O/P EST MOD 30 MIN: CPT | Mod: 25

## 2023-01-10 RX ORDER — CILOSTAZOL 50 MG/1
50 TABLET ORAL
Qty: 60 | Refills: 6 | Status: DISCONTINUED | COMMUNITY
Start: 2021-10-12 | End: 2023-01-10

## 2023-01-10 RX ORDER — ASPIRIN 81 MG/1
81 TABLET, DELAYED RELEASE ORAL
Qty: 90 | Refills: 3 | Status: ACTIVE | COMMUNITY
Start: 2023-01-10 | End: 1900-01-01

## 2023-01-10 RX ORDER — PANTOPRAZOLE 40 MG/1
40 TABLET, DELAYED RELEASE ORAL
Qty: 30 | Refills: 3 | Status: DISCONTINUED | COMMUNITY
Start: 2021-05-12 | End: 2023-01-10

## 2023-01-10 RX ORDER — CILOSTAZOL 50 MG/1
50 TABLET ORAL
Qty: 180 | Refills: 3 | Status: DISCONTINUED | COMMUNITY
Start: 2022-01-19 | End: 2023-01-10

## 2023-01-10 NOTE — HISTORY OF PRESENT ILLNESS
[FreeTextEntry1] : Follow up  [de-identified] : Mr. DEBBIE ANDRADE is a 72 year old man with pmhx of CAD w/ 3 stents, ELLIOTT on cpap, prediabetes, hepatic steatosis, bph recent rezum, hx of bladder hematoma with resolution / left saphenous vein thrombosis, urethral stricture s/p surgery, chronic gastritis, hx of foot ulcer who presents for a follow up. Switching aspirin to safety coated. He had clopidogrel at home, he is unclear if he should continue on it. Renewals of other medications. He is UTD with medications. \par \par He endorses occasional fogetfullenss, he will monitor an see neurologist if worsens.

## 2023-01-24 ENCOUNTER — APPOINTMENT (OUTPATIENT)
Dept: GASTROENTEROLOGY | Facility: CLINIC | Age: 73
End: 2023-01-24
Payer: MEDICARE

## 2023-01-24 VITALS
OXYGEN SATURATION: 96 % | HEIGHT: 65 IN | TEMPERATURE: 97.5 F | DIASTOLIC BLOOD PRESSURE: 80 MMHG | HEART RATE: 76 BPM | SYSTOLIC BLOOD PRESSURE: 123 MMHG | WEIGHT: 160 LBS | BODY MASS INDEX: 26.66 KG/M2

## 2023-01-24 DIAGNOSIS — K76.0 FATTY (CHANGE OF) LIVER, NOT ELSEWHERE CLASSIFIED: ICD-10-CM

## 2023-01-24 DIAGNOSIS — K31.7 POLYP OF STOMACH AND DUODENUM: ICD-10-CM

## 2023-01-24 PROCEDURE — 99203 OFFICE O/P NEW LOW 30 MIN: CPT

## 2023-01-26 PROBLEM — K31.7 GASTRIC POLYP: Status: ACTIVE | Noted: 2023-01-10

## 2023-01-26 PROBLEM — K76.0 FATTY LIVER: Status: ACTIVE | Noted: 2021-08-10

## 2023-01-26 NOTE — PHYSICAL EXAM
[No Respiratory Distress] : no respiratory distress [Abdomen Tenderness] : non-tender [Abdomen Soft] : soft [Normal] : oriented to person, place, and time

## 2023-01-26 NOTE — HISTORY OF PRESENT ILLNESS
[FreeTextEntry1] :  #[id number]\par \par 72M w CAD s/p multiple PCI unclear if he is on DAPT,  ELLIOTT on cpap, hepatic steatosis, former patient of Dr Hamm, here today to establish care. \par \par Reports long standing symptoms of bloating and gas. Ongoing for 6-7 yrs. \par He has seen multiple GI providers before and previously followed w Dr Paredes. \par He has noticed some improvement with changing his diet to cut out onions, canned foods, certain vegetables. \par He does not avoid these food triggers but tries to limit them. \par Some improvement also with beano that was recommended by his PCP. \par \par Bowel movements are usually hard and small. \par + straining. \par \par He denies abd pain, n/v, hematochezia, melena, GERD, dysphagia, odynophagia, weight loss. \par \par Prior notes say he was given trial of PPI, H2 blocker, rifaximin but patient does not recall if these were helpful. \par \par He had an egd/colonoscopy about 4 yrs ago with another GI provider, no report available for review. \par Abd u/s 2021 - diffuse fatty infiltration of liver, right lobe hepatic cyst \par \par \par No fhx of GI malignancy\par \par \par

## 2023-01-26 NOTE — ASSESSMENT
[FreeTextEntry1] :  #[id number]\par \par 72M w CAD s/p multiple PCI unclear if he is on DAPT,  ELLIOTT on cpap, hepatic steatosis, here today to establish care. Symptoms of bloating and gas x many years, seen by multiple prior GIs including Dr Paredes in 2021. No fhx of GI malignancy. \par \par Workup  in EMR - \par He had an egd/colonoscopy about 4 yrs ago with another GI provider, no report available for review. \par Abd u/s 2021 - diffuse fatty infiltration of liver, right lobe hepatic cyst \par Prior notes say he was given trial of PPI, H2 blocker, rifaximin but patient does not recall if these were helpful. \par \par \par # Gas\par # Bloating \par # Constipation\par - Likley multifactorial gas/bloat with constipation as partial etiology\par - start miralax daily, titrate as needed \par - if no response, will give trial of linzess \par - ok to continue prn gas-ex and beano as he finds them to be helpful \par - he has identified food triggers as above, should limit those in diet. \par - keep food/symptom journal \par - patient to bring in repor to last EGD/Colon done at OSH ~4 yrs ago. He will bring to next visit. \par - will consider SIBO breath test if persistent blaoting \par \par # Gastric poly\par PCP notes state gastric polyp as reason for referral. \par No endoscopy on file \par Patient does not recall being told he had a gastric polyp\par Will review his EGD report when he brings it in to his next visi t\par \par # Hepatic steatosis \par Noted on abd u/s \par Labs - AST 23, ALT 39, ALP 82, Tbili 1\par Healthy eating and exercise reviewed \par \par RTC in 4-6 wks

## 2023-03-23 ENCOUNTER — APPOINTMENT (OUTPATIENT)
Dept: OPHTHALMOLOGY | Facility: CLINIC | Age: 73
End: 2023-03-23
Payer: MEDICARE

## 2023-03-23 ENCOUNTER — NON-APPOINTMENT (OUTPATIENT)
Age: 73
End: 2023-03-23

## 2023-03-23 PROCEDURE — 92014 COMPRE OPH EXAM EST PT 1/>: CPT

## 2023-03-28 ENCOUNTER — APPOINTMENT (OUTPATIENT)
Dept: PAIN MANAGEMENT | Facility: CLINIC | Age: 73
End: 2023-03-28
Payer: MEDICARE

## 2023-03-28 VITALS
HEART RATE: 69 BPM | WEIGHT: 160 LBS | SYSTOLIC BLOOD PRESSURE: 124 MMHG | BODY MASS INDEX: 26.66 KG/M2 | HEIGHT: 65 IN | DIASTOLIC BLOOD PRESSURE: 82 MMHG

## 2023-03-28 DIAGNOSIS — R51.9 HEADACHE, UNSPECIFIED: ICD-10-CM

## 2023-03-28 PROCEDURE — 99204 OFFICE O/P NEW MOD 45 MIN: CPT

## 2023-03-28 NOTE — ASSESSMENT
[FreeTextEntry1] : Patient would like an MRI due to sister with hx of brain tumor. \par Discussed headache hygiene. \par

## 2023-03-28 NOTE — HISTORY OF PRESENT ILLNESS
[FreeTextEntry1] :  #[id number]\par  Pt with CC= "Frequent headaches " . \par Pt explains mother had headaches and sister had headaches and a brain tumor. \par  Pt has a headaches 1-3x/ week . The headaches began 1 year ago. \par +Relief from rest , and will take Tylenol if needed. \par Duration of h/a is 5-10 minutes Triggered by : eating , exercise , long driving . \par \par Sleep - during the day - pt employed at night cleaning gyms\par  Eats well, Drinking water, \par PMHx; Cardiac stents x3, high cholesterol \par \par Denies smoking , Denies ETOH \par  [Headache] : headache [Dizziness] : dizziness [Nausea] : no nausea [Vomiting] : no Vomiting [Photophobia] : photophobia [Phonophobia] : no phonophobia [Neck Pain] : neck pain [Scotoma] : no scotoma [Numbness] : no numbness [Tingling] : no tingling [Weakness] : no weakness [Scalp Tenderness] : no scalp tenderness [___ Times Per Week] : [unfilled] times each week

## 2023-03-28 NOTE — PHYSICAL EXAM
[General Appearance - Alert] : alert [General Appearance - In No Acute Distress] : in no acute distress [General Appearance - Well Nourished] : well nourished [General Appearance - Well Developed] : well developed [General Appearance - Well-Appearing] : healthy appearing [] : normal voice and communication [Oriented To Time, Place, And Person] : oriented to person, place, and time [Affect] : the affect was normal [Mood] : the mood was normal [Memory Recent] : recent memory was not impaired [Memory Remote] : remote memory was not impaired [Cranial Nerves Facial (VII)] : face symmetrical [Cranial Nerves Glossopharyngeal (IX)] : tongue and palate midline [Cranial Nerves Accessory (XI - Cranial And Spinal)] : head turning and shoulder shrug symmetric [Cranial Nerves Hypoglossal (XII)] : there was no tongue deviation with protrusion [Motor Strength] : muscle strength was normal in all four extremities [Paresis Pronator Drift Right-Sided] : no pronator drift on the right [Paresis Pronator Drift Left-Sided] : no pronator drift on the left [Motor Strength Upper Extremities Bilaterally] : strength was normal in both upper extremities [Motor Strength Lower Extremities Bilaterally] : strength was normal in both lower extremities [Coordination - Dysmetria Impaired Finger-to-Nose Bilateral] : not present [2+] : Patella left 2+ [Sclera] : the sclera and conjunctiva were normal [PERRL With Normal Accommodation] : pupils were equal in size, round, reactive to light, with normal accommodation [Extraocular Movements] : extraocular movements were intact [Abnormal Walk] : normal gait

## 2023-04-05 ENCOUNTER — APPOINTMENT (OUTPATIENT)
Dept: MRI IMAGING | Facility: HOSPITAL | Age: 73
End: 2023-04-05

## 2023-04-10 ENCOUNTER — OUTPATIENT (OUTPATIENT)
Dept: OUTPATIENT SERVICES | Facility: HOSPITAL | Age: 73
LOS: 1 days | End: 2023-04-10
Payer: COMMERCIAL

## 2023-04-10 ENCOUNTER — NON-APPOINTMENT (OUTPATIENT)
Age: 73
End: 2023-04-10

## 2023-04-10 ENCOUNTER — APPOINTMENT (OUTPATIENT)
Dept: MRI IMAGING | Facility: CLINIC | Age: 73
End: 2023-04-10
Payer: COMMERCIAL

## 2023-04-10 DIAGNOSIS — S82.91XA UNSPECIFIED FRACTURE OF RIGHT LOWER LEG, INITIAL ENCOUNTER FOR CLOSED FRACTURE: Chronic | ICD-10-CM

## 2023-04-10 DIAGNOSIS — R51.9 HEADACHE, UNSPECIFIED: ICD-10-CM

## 2023-04-10 PROCEDURE — 70551 MRI BRAIN STEM W/O DYE: CPT

## 2023-04-10 PROCEDURE — 70551 MRI BRAIN STEM W/O DYE: CPT | Mod: 26

## 2023-04-11 ENCOUNTER — APPOINTMENT (OUTPATIENT)
Dept: CARDIOLOGY | Facility: CLINIC | Age: 73
End: 2023-04-11
Payer: COMMERCIAL

## 2023-04-11 ENCOUNTER — NON-APPOINTMENT (OUTPATIENT)
Age: 73
End: 2023-04-11

## 2023-04-11 ENCOUNTER — APPOINTMENT (OUTPATIENT)
Dept: CARDIOLOGY | Facility: CLINIC | Age: 73
End: 2023-04-11

## 2023-04-11 VITALS
HEIGHT: 65 IN | HEART RATE: 77 BPM | OXYGEN SATURATION: 95 % | SYSTOLIC BLOOD PRESSURE: 125 MMHG | WEIGHT: 160 LBS | DIASTOLIC BLOOD PRESSURE: 71 MMHG | BODY MASS INDEX: 26.66 KG/M2

## 2023-04-11 PROCEDURE — 93000 ELECTROCARDIOGRAM COMPLETE: CPT

## 2023-04-11 PROCEDURE — 99213 OFFICE O/P EST LOW 20 MIN: CPT | Mod: 25

## 2023-04-12 NOTE — CARDIOLOGY SUMMARY
[de-identified] : 4/11/23\par Sinus  Rhythm \par Low voltage in limb leads. \par  -Decreasing R-wave progression -may be secondary to pulmonary disease   consider old anterior infarct. \par \par ABNORMAL \par

## 2023-04-12 NOTE — DISCUSSION/SUMMARY
[Patient] : the patient [Risks] : risks [Benefits] : benefits [Alternatives] : alternatives [With Me] : with me [___ Month(s)] : in [unfilled] month(s) [FreeTextEntry1] : 73 y/o man with prior NSTEMI s/p PCI with YARIEL to pLAD and OM1. Preserved LV function. \par \par \par CAD - s/p repeat D1 stenting. feels well.  No changes to meds.\par HLD- c/w current meds\par \par F/u 6 mo [EKG obtained to assist in diagnosis and management of assessed problem(s)] : EKG obtained to assist in diagnosis and management of assessed problem(s)

## 2023-04-12 NOTE — HISTORY OF PRESENT ILLNESS
[FreeTextEntry1] : Ernesto is returning for a f/u \par \par Feels well\par No Chest pain or dyspnea  \par No syncope or palpitations\par No falls or blackouts\par No LE edema\par Notes recent stress\par

## 2023-04-20 ENCOUNTER — APPOINTMENT (OUTPATIENT)
Dept: OPHTHALMOLOGY | Facility: CLINIC | Age: 73
End: 2023-04-20
Payer: COMMERCIAL

## 2023-04-20 ENCOUNTER — NON-APPOINTMENT (OUTPATIENT)
Age: 73
End: 2023-04-20

## 2023-04-20 PROCEDURE — 92012 INTRM OPH EXAM EST PATIENT: CPT

## 2023-04-20 PROCEDURE — 95060 OPH MUCOUS MEMBRANE TESTS: CPT | Mod: LT

## 2023-05-09 ENCOUNTER — APPOINTMENT (OUTPATIENT)
Dept: PAIN MANAGEMENT | Facility: CLINIC | Age: 73
End: 2023-05-09
Payer: MEDICARE

## 2023-05-09 VITALS
HEART RATE: 74 BPM | WEIGHT: 160 LBS | DIASTOLIC BLOOD PRESSURE: 67 MMHG | SYSTOLIC BLOOD PRESSURE: 112 MMHG | HEIGHT: 65 IN | BODY MASS INDEX: 26.66 KG/M2

## 2023-05-09 PROCEDURE — 99214 OFFICE O/P EST MOD 30 MIN: CPT

## 2023-05-09 NOTE — ASSESSMENT
[FreeTextEntry1] : I will recommend physical therapy for h/a \par I will refer to Sandra Richard NP for memory and balance difficulty.

## 2023-05-09 NOTE — HISTORY OF PRESENT ILLNESS
[FreeTextEntry1] :  #[id number]( [place]) \par Reviewed MRI with patient - " No acute intracranial hemorrhage , acute infarction,extra - axial collections or hydrocephalus" .\par Pt continues to have headache 1-3x/ week and described as tension or pressure . Duration is less than 10 minutes. \par Pt also reports memory issues  and balance difficulty. \par  [Headache] : headache [Dizziness] : dizziness [Nausea] : no nausea [Vomiting] : no Vomiting [Photophobia] : photophobia [Phonophobia] : no phonophobia [Neck Pain] : neck pain [Scotoma] : no scotoma [Numbness] : no numbness [Tingling] : no tingling [Weakness] : no weakness [Scalp Tenderness] : no scalp tenderness [___ Times Per Week] : [unfilled] times each week

## 2023-05-31 ENCOUNTER — NON-APPOINTMENT (OUTPATIENT)
Age: 73
End: 2023-05-31

## 2023-06-02 ENCOUNTER — APPOINTMENT (OUTPATIENT)
Dept: INTERNAL MEDICINE | Facility: CLINIC | Age: 73
End: 2023-06-02
Payer: MEDICARE

## 2023-06-02 VITALS
DIASTOLIC BLOOD PRESSURE: 60 MMHG | HEART RATE: 103 BPM | SYSTOLIC BLOOD PRESSURE: 142 MMHG | HEIGHT: 65 IN | BODY MASS INDEX: 26.99 KG/M2 | OXYGEN SATURATION: 93 % | WEIGHT: 162 LBS | TEMPERATURE: 98 F

## 2023-06-02 DIAGNOSIS — R50.9 FEVER, UNSPECIFIED: ICD-10-CM

## 2023-06-02 DIAGNOSIS — R09.82 POSTNASAL DRIP: ICD-10-CM

## 2023-06-02 PROCEDURE — 99214 OFFICE O/P EST MOD 30 MIN: CPT | Mod: 25

## 2023-06-07 DIAGNOSIS — R70.0 ELEVATED ERYTHROCYTE SEDIMENTATION RATE: ICD-10-CM

## 2023-06-07 PROBLEM — R50.9 FEVER, UNSPECIFIED FEVER CAUSE: Status: ACTIVE | Noted: 2023-06-02

## 2023-06-07 LAB
ALBUMIN SERPL ELPH-MCNC: 4.1 G/DL
ALP BLD-CCNC: 78 U/L
ALT SERPL-CCNC: 30 U/L
ANA PAT FLD IF-IMP: ABNORMAL
ANA SER IF-ACNC: ABNORMAL
ANION GAP SERPL CALC-SCNC: 11 MMOL/L
APPEARANCE: CLEAR
AST SERPL-CCNC: 21 U/L
BACTERIA: NEGATIVE /HPF
BILIRUB SERPL-MCNC: 0.6 MG/DL
BILIRUBIN URINE: NEGATIVE
BLOOD URINE: NEGATIVE
BUN SERPL-MCNC: 20 MG/DL
CALCIUM SERPL-MCNC: 9.3 MG/DL
CAST: 0 /LPF
CHLORIDE SERPL-SCNC: 102 MMOL/L
CO2 SERPL-SCNC: 24 MMOL/L
COLOR: YELLOW
CREAT SERPL-MCNC: 1.19 MG/DL
CRP SERPL-MCNC: 75 MG/L
EGFR: 65 ML/MIN/1.73M2
EPITHELIAL CELLS: 1 /HPF
ERYTHROCYTE [SEDIMENTATION RATE] IN BLOOD BY WESTERGREN METHOD: 75 MM/HR
ESTIMATED AVERAGE GLUCOSE: 120 MG/DL
GLUCOSE QUALITATIVE U: NEGATIVE MG/DL
GLUCOSE SERPL-MCNC: 135 MG/DL
HBA1C MFR BLD HPLC: 5.8 %
KETONES URINE: NEGATIVE MG/DL
LEUKOCYTE ESTERASE URINE: NEGATIVE
MICROSCOPIC-UA: NORMAL
NITRITE URINE: NEGATIVE
PH URINE: 6
POTASSIUM SERPL-SCNC: 4 MMOL/L
PROT SERPL-MCNC: 7.1 G/DL
PROTEIN URINE: 30 MG/DL
PSA FREE FLD-MCNC: 28 %
PSA FREE SERPL-MCNC: 0.65 NG/ML
PSA SERPL-MCNC: 2.32 NG/ML
RAPID RVP RESULT: NOT DETECTED
RED BLOOD CELLS URINE: 3 /HPF
SARS-COV-2 RNA PNL RESP NAA+PROBE: NOT DETECTED
SODIUM SERPL-SCNC: 138 MMOL/L
SPECIFIC GRAVITY URINE: 1.03
UROBILINOGEN URINE: 0.2 MG/DL
WHITE BLOOD CELLS URINE: 1 /HPF

## 2023-06-07 NOTE — HISTORY OF PRESENT ILLNESS
[FreeTextEntry8] : Mr. ANDRADE is a 72 year old male who presents to the office for an acute visit due to a complaint of fever (Tmax 102F) and headaches x3 days. The patient has a history of BPH, CAD, ELLIOTT, pre-diabetes. The patient went to  with the above complaints and was found to be COVID19 negative- he was told he likely has a virus. The patient reports a very mild sore throat but no other overt symptoms - no cough, rhinorrhea, GI symptoms, ear pain, dysuria. The patient is following with Neuro for evaluation of headaches with no clear cause. He has no other complaints.

## 2023-06-09 ENCOUNTER — NON-APPOINTMENT (OUTPATIENT)
Age: 73
End: 2023-06-09

## 2023-06-12 ENCOUNTER — APPOINTMENT (OUTPATIENT)
Dept: RHEUMATOLOGY | Facility: CLINIC | Age: 73
End: 2023-06-12

## 2023-06-12 ENCOUNTER — RX RENEWAL (OUTPATIENT)
Age: 73
End: 2023-06-12

## 2023-06-13 ENCOUNTER — APPOINTMENT (OUTPATIENT)
Dept: RHEUMATOLOGY | Facility: CLINIC | Age: 73
End: 2023-06-13
Payer: MEDICARE

## 2023-06-13 VITALS
HEART RATE: 74 BPM | OXYGEN SATURATION: 95 % | DIASTOLIC BLOOD PRESSURE: 72 MMHG | HEIGHT: 65 IN | WEIGHT: 158 LBS | BODY MASS INDEX: 26.33 KG/M2 | TEMPERATURE: 98.1 F | SYSTOLIC BLOOD PRESSURE: 123 MMHG

## 2023-06-13 DIAGNOSIS — R70.0 ELEVATED ERYTHROCYTE SEDIMENTATION RATE: ICD-10-CM

## 2023-06-13 DIAGNOSIS — R79.82 ELEVATED C-REACTIVE PROTEIN (CRP): ICD-10-CM

## 2023-06-13 PROCEDURE — 99203 OFFICE O/P NEW LOW 30 MIN: CPT

## 2023-06-13 RX ORDER — POLYETHYLENE GLYCOL 3350 17 G/17G
17 POWDER, FOR SOLUTION ORAL DAILY
Qty: 3 | Refills: 2 | Status: COMPLETED | COMMUNITY
Start: 2023-01-26 | End: 2023-06-13

## 2023-06-14 NOTE — PHYSICAL EXAM
[General Appearance - Alert] : alert [General Appearance - In No Acute Distress] : in no acute distress [Respiration, Rhythm And Depth] : normal respiratory rhythm and effort [Auscultation Breath Sounds / Voice Sounds] : lungs were clear to auscultation bilaterally [Heart Rate And Rhythm] : heart rate was normal and rhythm regular [Murmurs] : no murmurs [Heart Sounds] : normal S1 and S2 [Abdomen Soft] : soft [Edema] : there was no peripheral edema [Abdomen Tenderness] : non-tender [] : no rash [Musculoskeletal - Swelling] : no joint swelling seen [Cranial Nerves] : cranial nerves 2-12 were intact [Deep Tendon Reflexes (DTR)] : deep tendon reflexes were 2+ and symmetric [Oriented To Time, Place, And Person] : oriented to person, place, and time [Impaired Insight] : insight and judgment were intact

## 2023-06-20 LAB
ALBUMIN MFR SERPL ELPH: 53.9 %
ALBUMIN SERPL ELPH-MCNC: 4.1 G/DL
ALBUMIN SERPL-MCNC: 3.7 G/DL
ALBUMIN/GLOB SERPL: 1.2 RATIO
ALP BLD-CCNC: 95 U/L
ALPHA1 GLOB MFR SERPL ELPH: 4.1 %
ALPHA1 GLOB SERPL ELPH-MCNC: 0.3 G/DL
ALPHA2 GLOB MFR SERPL ELPH: 13.1 %
ALPHA2 GLOB SERPL ELPH-MCNC: 0.9 G/DL
ALT SERPL-CCNC: 46 U/L
ANION GAP SERPL CALC-SCNC: 12 MMOL/L
APPEARANCE: CLEAR
AST SERPL-CCNC: 27 U/L
B-GLOBULIN MFR SERPL ELPH: 12.5 %
B-GLOBULIN SERPL ELPH-MCNC: 0.8 G/DL
BACTERIA: NEGATIVE /HPF
BILIRUB SERPL-MCNC: 0.4 MG/DL
BILIRUBIN URINE: NEGATIVE
BLOOD URINE: NEGATIVE
BUN SERPL-MCNC: 17 MG/DL
CALCIUM SERPL-MCNC: 9.2 MG/DL
CAST: 0 /LPF
CHLORIDE SERPL-SCNC: 104 MMOL/L
CK SERPL-CCNC: 259 U/L
CO2 SERPL-SCNC: 24 MMOL/L
COLOR: YELLOW
CREAT SERPL-MCNC: 1.08 MG/DL
CREAT SPEC-SCNC: 99 MG/DL
CREAT/PROT UR: 0.1 RATIO
CRP SERPL-MCNC: <3 MG/L
DEPRECATED KAPPA LC FREE/LAMBDA SER: 1.81 RATIO
DSDNA AB SER-ACNC: <12 IU/ML
EGFR: 73 ML/MIN/1.73M2
ENA RNP AB SER IA-ACNC: <0.2 AL
ENA SM AB SER IA-ACNC: <0.2 AL
ENA SS-A AB SER IA-ACNC: <0.2 AL
ENA SS-B AB SER IA-ACNC: <0.2 AL
EPITHELIAL CELLS: 0 /HPF
ERYTHROCYTE [SEDIMENTATION RATE] IN BLOOD BY WESTERGREN METHOD: 38 MM/HR
GAMMA GLOB FLD ELPH-MCNC: 1.1 G/DL
GAMMA GLOB MFR SERPL ELPH: 16.4 %
GLUCOSE QUALITATIVE U: NEGATIVE MG/DL
GLUCOSE SERPL-MCNC: 95 MG/DL
IGA SER QL IEP: 331 MG/DL
IGG SER QL IEP: 1150 MG/DL
IGM SER QL IEP: 33 MG/DL
INTERPRETATION SERPL IEP-IMP: NORMAL
KAPPA LC CSF-MCNC: 1.56 MG/DL
KAPPA LC SERPL-MCNC: 2.82 MG/DL
KETONES URINE: NEGATIVE MG/DL
LEUKOCYTE ESTERASE URINE: NEGATIVE
M PROTEIN SPEC IFE-MCNC: NORMAL
MICROSCOPIC-UA: NORMAL
NITRITE URINE: NEGATIVE
PH URINE: 7
POTASSIUM SERPL-SCNC: 4.4 MMOL/L
PROT SERPL-MCNC: 6.8 G/DL
PROT UR-MCNC: 7 MG/DL
PROTEIN URINE: NEGATIVE MG/DL
RED BLOOD CELLS URINE: 1 /HPF
SODIUM SERPL-SCNC: 141 MMOL/L
SPECIFIC GRAVITY URINE: 1.02
UROBILINOGEN URINE: 0.2 MG/DL
WHITE BLOOD CELLS URINE: 1 /HPF

## 2023-06-20 NOTE — CONSULT LETTER
[Consult Letter:] : I had the pleasure of evaluating your patient, [unfilled]. [Please see my note below.] : Please see my note below. [Consult Closing:] : Thank you very much for allowing me to participate in the care of this patient.  If you have any questions, please do not hesitate to contact me. [Sincerely,] : Sincerely, [Dear  ___] : Dear  [unfilled], [FreeTextEntry2] : Tung Merrill MD [FreeTextEntry3] : Ariana Bennett MD\par Director, [facility], \par Rheumatology Division, Department of Medicine\par , \par [employer] \par at [facility]\par \par [address], [address]\par [place] [address]\par Tel: [phone]\par

## 2023-06-20 NOTE — ASSESSMENT
[FreeTextEntry1] : Patient has inflammatory markers - ? etiology\par Headache with predominantly occipital pain - clinical picture is not consistent with GCA\par \par = labs \par = pain management as per neurologist\par \par

## 2023-06-20 NOTE — HISTORY OF PRESENT ILLNESS
[None] : The patient is currently asymptomatic [FreeTextEntry1] : The patient has a headache 1-3x/ week, on and off x 1 year , saw neurologist and had MRI that showed " No acute intracranial hemorrhage , acute infarction,extra - axial collections or hydrocephalus" . Pain is around the head and in upper neck region, occasionally in upper arms.\par headache is on and off, but had it three times this last week. each time headache comes it lasts about 3-4 houts\par he has fever and chills last week, last episode was 6/10 /2023 , max -100.3 \par \par \par Systemic fatigue, sleep difficulties related to his work scheduled ( works at night) . Has no jaw pain, no jaw claudication, no new vision changes, reports occasional dizziness. Has cough cough time to time, very mild - started about 1 week ago.\par Was seen by PCP and was found to have elevated inflammatory markers and was referred to rheumatology evaluation\par \par PMH : hypercholesterolemia , HIstory of CAD , s/p angioplasty x 3 - 2018, 2020, 2021.\par SH : nonsmoker\par FMH : mother - frequent headaches, sister - brain tumor, another sister - fatty liver; 2 daughters , healthy and son dies in the accident at the age of 32\par \par \par  colonoscopy - 6 years ago

## 2023-06-20 NOTE — REASON FOR VISIT
[Consultation] : a consultation visit [[employer] ] : provided by [employer]   [Interpreters_IDNumber] : [id number] [Interpreters_FullName] : Tarik [TWNoteComboBox1] : [place]

## 2023-07-11 ENCOUNTER — APPOINTMENT (OUTPATIENT)
Dept: VASCULAR SURGERY | Facility: CLINIC | Age: 73
End: 2023-07-11

## 2023-07-25 ENCOUNTER — APPOINTMENT (OUTPATIENT)
Dept: VASCULAR SURGERY | Facility: CLINIC | Age: 73
End: 2023-07-25
Payer: COMMERCIAL

## 2023-07-25 PROCEDURE — 99204 OFFICE O/P NEW MOD 45 MIN: CPT

## 2023-07-25 RX ORDER — SIMETHICONE 80 MG/1
80 TABLET, CHEWABLE ORAL
Qty: 30 | Refills: 0 | Status: DISCONTINUED | COMMUNITY
Start: 2021-08-17 | End: 2023-07-25

## 2023-07-25 RX ORDER — CLOTRIMAZOLE AND BETAMETHASONE DIPROPIONATE 10; .5 MG/G; MG/G
1-0.05 CREAM TOPICAL TWICE DAILY
Qty: 1 | Refills: 3 | Status: ACTIVE | COMMUNITY
Start: 2023-07-25 | End: 1900-01-01

## 2023-07-25 RX ORDER — RESORCINOL/BALSAM/BISMUTH/ZINC
SUPPOSITORY, RECTAL RECTAL 3 TIMES DAILY
Qty: 90 | Refills: 1 | Status: DISCONTINUED | COMMUNITY
Start: 2021-07-16 | End: 2023-07-25

## 2023-07-25 RX ORDER — CILOSTAZOL 50 MG/1
50 TABLET ORAL
Qty: 180 | Refills: 3 | Status: DISCONTINUED | COMMUNITY
Start: 2022-03-29 | End: 2023-07-25

## 2023-07-25 RX ORDER — ELECTROLYTES/DEXTROSE
SOLUTION, ORAL ORAL
Refills: 0 | Status: DISCONTINUED | COMMUNITY
End: 2023-07-25

## 2023-07-25 RX ORDER — FLUTICASONE PROPIONATE 50 UG/1
50 SPRAY, METERED NASAL DAILY
Qty: 1 | Refills: 0 | Status: DISCONTINUED | COMMUNITY
Start: 2023-06-02 | End: 2023-07-25

## 2023-07-25 RX ORDER — TRETINOIN 0.25 MG/G
0.03 CREAM TOPICAL
Qty: 1 | Refills: 1 | Status: DISCONTINUED | COMMUNITY
Start: 2022-04-27 | End: 2023-07-25

## 2023-07-25 NOTE — HISTORY OF PRESENT ILLNESS
[FreeTextEntry1] : 72-year-old gentleman presents to the office complaining of pain in his left lower extremity.  Patient has a history of venous ablation many years ago.  Patient states that the pain is mostly at rest and while standing.  He states that the leg feels improved with ambulation.  He does not have numbness in his lower extremities.  He states that he does not have any back pain at this time.  In the past he has had numerous vascular test which have been negative.  He presents to the office for evaluation

## 2023-07-25 NOTE — PHYSICAL EXAM
[JVD] : no jugular venous distention  [Normal Breath Sounds] : Normal breath sounds [Normal Rate and Rhythm] : normal rate and rhythm [2+] : left 2+ [Ankle Swelling (On Exam)] : not present [Varicose Veins Of Lower Extremities] : not present [] : not present [Abdomen Tenderness] : ~T ~M No abdominal tenderness [No Rash or Lesion] : No rash or lesion [Alert] : alert [Calm] : calm [de-identified] : Appears well

## 2023-07-25 NOTE — ASSESSMENT
[FreeTextEntry1] : Patient's previous studies have been assessed.  Patient has a normal lower extremity arterial exam.  I do not believe he has a vascular etiology for his left leg discomfort.  I have recommended he be evaluated by neurology.  In addition, I have provided with a prescription for Lotrisone cream due to bilateral tinea pedis he may follow-up as needed

## 2023-07-25 NOTE — REASON FOR VISIT
[Follow-Up: _____] : a [unfilled] follow-up visit [Source: ______] : History obtained from [unfilled] [FreeTextEntry1] : Left leg pain

## 2023-08-08 ENCOUNTER — APPOINTMENT (OUTPATIENT)
Dept: GASTROENTEROLOGY | Facility: CLINIC | Age: 73
End: 2023-08-08
Payer: MEDICARE

## 2023-08-08 VITALS
HEIGHT: 65 IN | DIASTOLIC BLOOD PRESSURE: 65 MMHG | SYSTOLIC BLOOD PRESSURE: 105 MMHG | TEMPERATURE: 97.3 F | OXYGEN SATURATION: 96 % | BODY MASS INDEX: 24.99 KG/M2 | HEART RATE: 75 BPM | WEIGHT: 150 LBS

## 2023-08-08 DIAGNOSIS — R10.9 UNSPECIFIED ABDOMINAL PAIN: ICD-10-CM

## 2023-08-08 DIAGNOSIS — R14.0 ABDOMINAL DISTENSION (GASEOUS): ICD-10-CM

## 2023-08-08 DIAGNOSIS — K64.8 OTHER HEMORRHOIDS: ICD-10-CM

## 2023-08-08 PROCEDURE — 99213 OFFICE O/P EST LOW 20 MIN: CPT

## 2023-08-10 PROBLEM — K64.8 OTHER HEMORRHOIDS: Status: ACTIVE | Noted: 2023-08-10

## 2023-08-10 PROBLEM — R10.9 ABDOMINAL DISCOMFORT: Status: ACTIVE | Noted: 2021-03-26

## 2023-08-10 PROBLEM — R14.0 ABDOMINAL BLOATING: Status: ACTIVE | Noted: 2020-11-12

## 2023-08-10 NOTE — HISTORY OF PRESENT ILLNESS
[FreeTextEntry1] :  # [id number]   Here for follow up visit  Last seen in office in Jan 2023  Taking miralax daily  Now has bowel movement daily Stool is normal to soft  + occasional straining  + blood with wiping  + intermittent rectal pain for which he uses preparation H   Bloating, gas, abd pain have improved/resolved since starting bean-o and gas-x   Brought in records ---  EGD 2020 - Dr Yi - hiatal hernia, gastritis. path = severe active chronic gastritis, + hpylori  Colonoscopy 2020 - Dr Yi - hemorrhoids, polyp in descending colon. Path - tubular adenoma

## 2023-08-10 NOTE — ASSESSMENT
[FreeTextEntry1] : 72M w CAD s/p multiple PCI -- ?unclear if taking DAPT (patient not sure of all of his meds), ELLIOTT on cpap, hepatic steatosis, here today to establish care. Symptoms of bloating and gas x many years, seen by multiple prior GIs including Dr Paredes in 2021. No fhx of GI malignancy.  EGD 2020 - Dr Yi - hiatal hernia, gastritis. path = severe active chronic gastritis, + hpylori  Colonoscopy 2020 - Dr Yi - hemorrhoids, polyp in descending colon. Path - tubular adenoma. Told to repeat in 3 yrs   # Gas # Bloating # Constipation - EGD/Colon reviewed as above  - constipation improved on miralax bowel regimen, to continue - bloating/gas now improved/ nearly resolved after starting gas-ex and bean-o  - keep food/symptom journal - will consider SIBO breath test if persistent blaoting  # Hemorrhoids  # Hematochezia  - rectal pain and bleeding likely 2/2 hemorrhoids  - cscope as above - continue prn preparation H  - calmol 4 suppositories  - referral to colorectal surgery, names/numbers given    # Hepatic steatosis Noted on abd u/s Labs - AST 23, ALT 39, ALP 82, Tbili 1 Healthy eating and exercise reviewed  # Colon cancer screening  - prior cscope reviewed as above - no fhx of colon cancer  - patient would like to defer cscope until after colorectal surgery eval   RTC in 3 mos

## 2023-08-16 NOTE — DATA REVIEWED
[FreeTextEntry1] : 2/26/2020 LEESA/PVR RLE \par                                  Leo Lower Extremities with no significant arterial occlusive disease  \par                                   and w vessel calcification\par                                  Rt LEESA  1.20 Lt LEESA  1.24\par                                 \par \par 2/26/2020 Venous Doppler Leo LE  no acute dvt svt \par                            RLE sig for thigh and calf insuff collaterals\par                            LLE  LSV insuff from spj to distal calf level w insuff knee and calf  collaterals\par \par  Quality 431: Preventive Care And Screening: Unhealthy Alcohol Use - Screening: Patient not identified as an unhealthy alcohol user when screened for unhealthy alcohol use using a systematic screening method Detail Level: Detailed Quality 226: Preventive Care And Screening: Tobacco Use: Screening And Cessation Intervention: Patient screened for tobacco use and is an ex/non-smoker

## 2023-09-21 ENCOUNTER — APPOINTMENT (OUTPATIENT)
Dept: SURGERY | Facility: CLINIC | Age: 73
End: 2023-09-21
Payer: COMMERCIAL

## 2023-09-21 VITALS
DIASTOLIC BLOOD PRESSURE: 76 MMHG | SYSTOLIC BLOOD PRESSURE: 122 MMHG | TEMPERATURE: 97.3 F | RESPIRATION RATE: 17 BRPM | OXYGEN SATURATION: 98 % | HEART RATE: 62 BPM

## 2023-09-21 PROCEDURE — 99203 OFFICE O/P NEW LOW 30 MIN: CPT | Mod: 25

## 2023-09-21 PROCEDURE — 46221 LIGATION OF HEMORRHOID(S): CPT

## 2023-09-27 ENCOUNTER — APPOINTMENT (OUTPATIENT)
Dept: INTERNAL MEDICINE | Facility: CLINIC | Age: 73
End: 2023-09-27
Payer: COMMERCIAL

## 2023-09-27 VITALS
BODY MASS INDEX: 26.16 KG/M2 | HEIGHT: 65 IN | TEMPERATURE: 97.6 F | HEART RATE: 68 BPM | DIASTOLIC BLOOD PRESSURE: 70 MMHG | SYSTOLIC BLOOD PRESSURE: 118 MMHG | OXYGEN SATURATION: 98 % | WEIGHT: 157 LBS

## 2023-09-27 DIAGNOSIS — Z23 ENCOUNTER FOR IMMUNIZATION: ICD-10-CM

## 2023-09-27 PROCEDURE — 90662 IIV NO PRSV INCREASED AG IM: CPT

## 2023-09-27 PROCEDURE — 36415 COLL VENOUS BLD VENIPUNCTURE: CPT

## 2023-09-27 PROCEDURE — G0439: CPT

## 2023-09-27 PROCEDURE — G0008: CPT

## 2023-09-27 RX ORDER — ICOSAPENT ETHYL 1 G/1
1 CAPSULE ORAL
Qty: 180 | Refills: 1 | Status: ACTIVE | COMMUNITY
Start: 2022-04-14 | End: 1900-01-01

## 2023-10-03 ENCOUNTER — APPOINTMENT (OUTPATIENT)
Dept: PAIN MANAGEMENT | Facility: CLINIC | Age: 73
End: 2023-10-03
Payer: COMMERCIAL

## 2023-10-03 VITALS
BODY MASS INDEX: 26.16 KG/M2 | SYSTOLIC BLOOD PRESSURE: 106 MMHG | WEIGHT: 157 LBS | DIASTOLIC BLOOD PRESSURE: 67 MMHG | HEIGHT: 65 IN

## 2023-10-03 PROCEDURE — 99213 OFFICE O/P EST LOW 20 MIN: CPT

## 2023-10-10 ENCOUNTER — APPOINTMENT (OUTPATIENT)
Dept: CARDIOLOGY | Facility: CLINIC | Age: 73
End: 2023-10-10
Payer: MEDICARE

## 2023-10-10 VITALS
HEART RATE: 66 BPM | WEIGHT: 157 LBS | BODY MASS INDEX: 26.16 KG/M2 | HEIGHT: 65 IN | SYSTOLIC BLOOD PRESSURE: 128 MMHG | DIASTOLIC BLOOD PRESSURE: 74 MMHG | OXYGEN SATURATION: 97 %

## 2023-10-10 PROCEDURE — 93000 ELECTROCARDIOGRAM COMPLETE: CPT

## 2023-10-10 PROCEDURE — 99214 OFFICE O/P EST MOD 30 MIN: CPT | Mod: 25

## 2023-10-17 ENCOUNTER — APPOINTMENT (OUTPATIENT)
Dept: NEUROLOGY | Facility: CLINIC | Age: 73
End: 2023-10-17

## 2023-10-24 ENCOUNTER — APPOINTMENT (OUTPATIENT)
Dept: CV DIAGNOSTICS | Facility: HOSPITAL | Age: 73
End: 2023-10-24

## 2023-10-24 ENCOUNTER — OUTPATIENT (OUTPATIENT)
Dept: OUTPATIENT SERVICES | Facility: HOSPITAL | Age: 73
LOS: 1 days | End: 2023-10-24
Payer: COMMERCIAL

## 2023-10-24 ENCOUNTER — NON-APPOINTMENT (OUTPATIENT)
Age: 73
End: 2023-10-24

## 2023-10-24 DIAGNOSIS — I25.10 ATHEROSCLEROTIC HEART DISEASE OF NATIVE CORONARY ARTERY WITHOUT ANGINA PECTORIS: ICD-10-CM

## 2023-10-24 DIAGNOSIS — S82.91XA UNSPECIFIED FRACTURE OF RIGHT LOWER LEG, INITIAL ENCOUNTER FOR CLOSED FRACTURE: Chronic | ICD-10-CM

## 2023-10-24 PROCEDURE — 78452 HT MUSCLE IMAGE SPECT MULT: CPT | Mod: 26,MH

## 2023-10-24 PROCEDURE — 93016 CV STRESS TEST SUPVJ ONLY: CPT | Mod: MH

## 2023-10-24 PROCEDURE — 93018 CV STRESS TEST I&R ONLY: CPT | Mod: MH

## 2023-10-24 PROCEDURE — 93017 CV STRESS TEST TRACING ONLY: CPT

## 2023-10-24 PROCEDURE — 78452 HT MUSCLE IMAGE SPECT MULT: CPT | Mod: MH

## 2023-10-24 PROCEDURE — A9500: CPT

## 2023-11-21 ENCOUNTER — APPOINTMENT (OUTPATIENT)
Dept: GASTROENTEROLOGY | Facility: CLINIC | Age: 73
End: 2023-11-21

## 2023-11-29 LAB
ALBUMIN SERPL ELPH-MCNC: 4.6 G/DL
ALP BLD-CCNC: 70 U/L
ALT SERPL-CCNC: 38 U/L
ANION GAP SERPL CALC-SCNC: 12 MMOL/L
APPEARANCE: CLEAR
AST SERPL-CCNC: 25 U/L
BACTERIA: NEGATIVE /HPF
BILIRUB SERPL-MCNC: 0.8 MG/DL
BILIRUBIN URINE: NEGATIVE
BLOOD URINE: NEGATIVE
BUN SERPL-MCNC: 14 MG/DL
CALCIUM SERPL-MCNC: 9.4 MG/DL
CAST: 0 /LPF
CHLORIDE SERPL-SCNC: 107 MMOL/L
CHOLEST SERPL-MCNC: 106 MG/DL
CO2 SERPL-SCNC: 23 MMOL/L
COLOR: YELLOW
CREAT SERPL-MCNC: 1.04 MG/DL
EGFR: 76 ML/MIN/1.73M2
EPITHELIAL CELLS: 1 /HPF
ESTIMATED AVERAGE GLUCOSE: 120 MG/DL
GLUCOSE QUALITATIVE U: NEGATIVE MG/DL
GLUCOSE SERPL-MCNC: 98 MG/DL
HBA1C MFR BLD HPLC: 5.8 %
HDLC SERPL-MCNC: 30 MG/DL
KETONES URINE: NEGATIVE MG/DL
LDLC SERPL CALC-MCNC: 55 MG/DL
LEUKOCYTE ESTERASE URINE: NEGATIVE
MICROSCOPIC-UA: NORMAL
NITRITE URINE: NEGATIVE
NONHDLC SERPL-MCNC: 76 MG/DL
PH URINE: 6
POTASSIUM SERPL-SCNC: 4 MMOL/L
PROT SERPL-MCNC: 7.1 G/DL
PROTEIN URINE: NEGATIVE MG/DL
PSA FREE FLD-MCNC: 34 %
PSA FREE SERPL-MCNC: 0.73 NG/ML
PSA SERPL-MCNC: 2.17 NG/ML
RED BLOOD CELLS URINE: 2 /HPF
SODIUM SERPL-SCNC: 143 MMOL/L
SPECIFIC GRAVITY URINE: 1.02
TRIGL SERPL-MCNC: 117 MG/DL
TSH SERPL-ACNC: 1.21 UIU/ML
UROBILINOGEN URINE: 0.2 MG/DL
WHITE BLOOD CELLS URINE: 1 /HPF

## 2023-11-30 ENCOUNTER — APPOINTMENT (OUTPATIENT)
Dept: OPHTHALMOLOGY | Facility: CLINIC | Age: 73
End: 2023-11-30

## 2023-12-28 ENCOUNTER — APPOINTMENT (OUTPATIENT)
Dept: INTERNAL MEDICINE | Facility: CLINIC | Age: 73
End: 2023-12-28
Payer: MEDICARE

## 2023-12-28 VITALS
DIASTOLIC BLOOD PRESSURE: 70 MMHG | OXYGEN SATURATION: 98 % | HEART RATE: 75 BPM | BODY MASS INDEX: 26.66 KG/M2 | TEMPERATURE: 98.6 F | WEIGHT: 160 LBS | SYSTOLIC BLOOD PRESSURE: 120 MMHG | HEIGHT: 65 IN

## 2023-12-28 DIAGNOSIS — B02.29 OTHER POSTHERPETIC NERVOUS SYSTEM INVOLVEMENT: ICD-10-CM

## 2023-12-28 PROCEDURE — 99213 OFFICE O/P EST LOW 20 MIN: CPT

## 2023-12-28 RX ORDER — GABAPENTIN 300 MG/1
300 CAPSULE ORAL 3 TIMES DAILY
Qty: 120 | Refills: 0 | Status: ACTIVE | COMMUNITY
Start: 2023-12-28 | End: 1900-01-01

## 2023-12-28 RX ORDER — CAPSAICIN 0.03 G/100G
0.03 CREAM TOPICAL 4 TIMES DAILY
Qty: 2 | Refills: 0 | Status: ACTIVE | COMMUNITY
Start: 2023-12-28 | End: 1900-01-01

## 2023-12-28 RX ORDER — CALAMINE 8% AND ZINC OXIDE 8% 160 MG/ML
LOTION TOPICAL 3 TIMES DAILY
Qty: 2 | Refills: 0 | Status: ACTIVE | COMMUNITY
Start: 2023-12-28 | End: 1900-01-01

## 2023-12-28 NOTE — HISTORY OF PRESENT ILLNESS
[FreeTextEntry8] :  Ernesto Patterson is a 73 YOM is coming to the clinic for acute visit with c/o burning sensation on his back started 2 months ago, unsure of having a rash, intermittent only on the upper back, denies fever chills, N/V diarrhea constipation,  H/o shingles in remote past. pt reports similar sensation as he experienced during shingles infection.

## 2023-12-28 NOTE — PHYSICAL EXAM
[No Acute Distress] : no acute distress [Well Nourished] : well nourished [Well Developed] : well developed [Well-Appearing] : well-appearing [Normal Sclera/Conjunctiva] : normal sclera/conjunctiva [PERRL] : pupils equal round and reactive to light [EOMI] : extraocular movements intact [Normal Outer Ear/Nose] : the outer ears and nose were normal in appearance [Normal Oropharynx] : the oropharynx was normal [No JVD] : no jugular venous distention [No Lymphadenopathy] : no lymphadenopathy [Supple] : supple [Thyroid Normal, No Nodules] : the thyroid was normal and there were no nodules present [No Respiratory Distress] : no respiratory distress  [No Accessory Muscle Use] : no accessory muscle use [Clear to Auscultation] : lungs were clear to auscultation bilaterally [Normal Rate] : normal rate  [Regular Rhythm] : with a regular rhythm [Normal S1, S2] : normal S1 and S2 [No Murmur] : no murmur heard [No Carotid Bruits] : no carotid bruits [No Abdominal Bruit] : a ~M bruit was not heard ~T in the abdomen [No Varicosities] : no varicosities [Pedal Pulses Present] : the pedal pulses are present [No Edema] : there was no peripheral edema [No Palpable Aorta] : no palpable aorta [No Extremity Clubbing/Cyanosis] : no extremity clubbing/cyanosis [Soft] : abdomen soft [Non Tender] : non-tender [Non-distended] : non-distended [No Masses] : no abdominal mass palpated [No HSM] : no HSM [Normal Bowel Sounds] : normal bowel sounds [Normal Posterior Cervical Nodes] : no posterior cervical lymphadenopathy [Normal Anterior Cervical Nodes] : no anterior cervical lymphadenopathy [No CVA Tenderness] : no CVA  tenderness [No Spinal Tenderness] : no spinal tenderness [No Joint Swelling] : no joint swelling [Grossly Normal Strength/Tone] : grossly normal strength/tone [No Rash] : no rash [Coordination Grossly Intact] : coordination grossly intact [No Focal Deficits] : no focal deficits [Normal Gait] : normal gait [Deep Tendon Reflexes (DTR)] : deep tendon reflexes were 2+ and symmetric [Normal Affect] : the affect was normal [Normal Insight/Judgement] : insight and judgment were intact [de-identified] : birth jacky on back with small hyper pigmentated macules on the back

## 2023-12-28 NOTE — ASSESSMENT
[FreeTextEntry1] : mild intermittent burning skin on the back likely after Herpes pt is unsure of rash in past but has H/o herpes in past  Start Gabapentin Calamine lotion Capsaicin cream  fall precaution and medication side effect discussed.

## 2024-01-04 ENCOUNTER — APPOINTMENT (OUTPATIENT)
Dept: PAIN MANAGEMENT | Facility: CLINIC | Age: 74
End: 2024-01-04
Payer: MEDICARE

## 2024-01-04 VITALS
DIASTOLIC BLOOD PRESSURE: 63 MMHG | HEART RATE: 72 BPM | BODY MASS INDEX: 26.66 KG/M2 | SYSTOLIC BLOOD PRESSURE: 102 MMHG | WEIGHT: 160 LBS | HEIGHT: 65 IN

## 2024-01-04 DIAGNOSIS — R42 DIZZINESS AND GIDDINESS: ICD-10-CM

## 2024-01-04 DIAGNOSIS — G44.86 CERVICOGENIC HEADACHE: ICD-10-CM

## 2024-01-04 PROCEDURE — 99213 OFFICE O/P EST LOW 20 MIN: CPT

## 2024-01-04 NOTE — PHYSICAL EXAM
[General Appearance - Alert] : alert [General Appearance - In No Acute Distress] : in no acute distress [General Appearance - Well Nourished] : well nourished [General Appearance - Well Developed] : well developed [General Appearance - Well-Appearing] : healthy appearing [Oriented To Time, Place, And Person] : oriented to person, place, and time [Affect] : the affect was normal [Mood] : the mood was normal [Cranial Nerves Facial (VII)] : face symmetrical [Cranial Nerves Accessory (XI - Cranial And Spinal)] : head turning and shoulder shrug symmetric [Cranial Nerves Hypoglossal (XII)] : there was no tongue deviation with protrusion [Motor Strength] : muscle strength was normal in all four extremities [Paresis Pronator Drift Right-Sided] : no pronator drift on the right [Paresis Pronator Drift Left-Sided] : no pronator drift on the left [Motor Strength Upper Extremities Bilaterally] : strength was normal in both upper extremities [Motor Strength Lower Extremities Bilaterally] : strength was normal in both lower extremities [Sclera] : the sclera and conjunctiva were normal [PERRL With Normal Accommodation] : pupils were equal in size, round, reactive to light, with normal accommodation [Extraocular Movements] : extraocular movements were intact [] : no respiratory distress

## 2024-01-04 NOTE — ASSESSMENT
[FreeTextEntry1] : h/a , cervicogenic h/a , dizziness . memory difficulty   PT , Vestibular Therapy  Will also refer to Dr Potter/ [facility] for cognitive difficulties.

## 2024-01-04 NOTE — HISTORY OF PRESENT ILLNESS
[FreeTextEntry1] :  used.  Pt reports h/a are improved to 1-2x/ week - suboccipital. He continues to have dizziness with h/a . He has not gone to Vestibular Therapy yet . He is requesting a new rx.  Last week was prescribed Gabapentin for a possible post - herpetic pain flare up . We discussed this may also help with h/a .   Pt is reporting memory difficulty for the past 3-5 months and would like to see a clinician for this .   [Headache] : headache [Nausea] : no nausea [Vomiting] : no Vomiting [Photophobia] : no photophobia [Phonophobia] : no phonophobia [Neck Pain] : neck pain [___ Times Per Week] : [unfilled] times each week

## 2024-01-07 NOTE — HISTORY OF PRESENT ILLNESS
[FreeTextEntry1] : Ernesto is returning for a f/u   Feels well No Chest pain or dyspnea   No syncope or palpitations No falls or blackouts No LE edema

## 2024-01-07 NOTE — CARDIOLOGY SUMMARY
[de-identified] : 10/10/23 Sinus Rhythm  -Inferolateral ST-elevation -possible repolarization variant -consider injury.  PROBABLY NORMAL

## 2024-01-07 NOTE — DISCUSSION/SUMMARY
[FreeTextEntry1] : 74 y/o man with prior NSTEMI s/p PCI with YARIEL to pLAD and OM1. Preserved LV function.    CAD - s/p repeat D1 stenting. feels well.  No changes to meds. HLD- c/w current meds labs with pcp F/u 6 mo [EKG obtained to assist in diagnosis and management of assessed problem(s)] : EKG obtained to assist in diagnosis and management of assessed problem(s)

## 2024-01-18 ENCOUNTER — APPOINTMENT (OUTPATIENT)
Dept: PULMONOLOGY | Facility: CLINIC | Age: 74
End: 2024-01-18
Payer: MEDICARE

## 2024-01-18 VITALS
OXYGEN SATURATION: 93 % | SYSTOLIC BLOOD PRESSURE: 117 MMHG | HEART RATE: 60 BPM | BODY MASS INDEX: 26.66 KG/M2 | TEMPERATURE: 98.6 F | HEIGHT: 65 IN | DIASTOLIC BLOOD PRESSURE: 71 MMHG | RESPIRATION RATE: 17 BRPM | WEIGHT: 160 LBS

## 2024-01-18 DIAGNOSIS — G47.33 OBSTRUCTIVE SLEEP APNEA (ADULT) (PEDIATRIC): ICD-10-CM

## 2024-01-18 PROCEDURE — 99214 OFFICE O/P EST MOD 30 MIN: CPT

## 2024-01-18 NOTE — HISTORY OF PRESENT ILLNESS
[Obstructive Sleep Apnea] : obstructive sleep apnea [Snoring] : snoring [Witnessed Apneas] : witnessed sleep apnea [Awakes Unrefreshed] : awakening unrefreshed [Awakes with Headache] : headache upon awakening [Awakening With Dry Mouth] : awakening with dry mouth [Daytime Somnolence] : daytime somnolence [FreeTextEntry1] : 72 yo M PMH CAD s/p stents, ELLIOTT on CPAP who presents for follow up of his sleep apnea.  The patient reports difficulty with sleep apnea control, experiencing cough in the mornings which interferes with CPAP machine use. He expresses concerns about snoring and seeks alternative solutions to CPAP. The patient also mentions a previous CPAP machine in [facility] and inquires about the use of an older model when traveling. Additionally, he discusses potential throat exercises and nasal devices as alternative treatments. The patient is planning a full dental implant on the upper part and questions the compatibility with CPAP use.  Of note: Last sleep study in 4/2012 with AHI of 14.5. Reports followed with outside sleep physician and was prescribed PAP therapy for symptoms of snoring as well as daytime somnolence, headaches, awakening unrefreshed, and dry mouth. Has only been on CPAP for the past 2.5-3 years. Denies DIS/DMS, nocturnal awakenings. Denies significant improvement in symptoms w/ PAP therapy other than cessation of snoring. Last seen by sleep physician 2 years ago. 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   He brings in his previous sleep studies from [facility] back in 2017 which again shows mild obstructive sleep apnea.  Patient has been using his CPAP machine as suggested he should continue to do.  Indicates that he feels better but continues to snore through his mask.  Indicates that he recently changed his mask.  He also indicates that he feels tired during the day but admits only getting 6 and half hours of sleep.  Would like to know what he can do in order to increase his total sleep time.   [Frequent Nocturnal Awakening] : no nocturnal awakening [Recent  Weight Gain] : no recent weight gain [DIS] : no DIS [DMS] : no DMS [Lower Extremity Discomfort] : no lower extremity discomfort in evening or at bedtime [% Days used: ____] : Days used: [unfilled] % [% Days used > 4 hrs: ____] : Days used > 4 hrs: [unfilled] % [Mean nightly usage: ___ hrs] : Mean nightly usage: [unfilled] hrs [___ min(s)] : [unfilled] min(s) [Therapy based AHI: ___ /hr] : Therapy based AHI: [unfilled] / hr [ESS] : 10

## 2024-01-18 NOTE — ED ADULT TRIAGE NOTE - HISTORY OF COVID-19 VACCINATION
[Normal Appearance] : normal appearance [Well Groomed] : well groomed [General Appearance - In No Acute Distress] : no acute distress [Edema] : no peripheral edema [Respiration, Rhythm And Depth] : normal respiratory rhythm and effort [Exaggerated Use Of Accessory Muscles For Inspiration] : no accessory muscle use [Abdomen Soft] : soft [Abdomen Tenderness] : non-tender [Costovertebral Angle Tenderness] : no ~M costovertebral angle tenderness [Urinary Bladder Findings] : the bladder was normal on palpation [Normal Station and Gait] : the gait and station were normal for the patient's age [] : no rash [No Focal Deficits] : no focal deficits [Oriented To Time, Place, And Person] : oriented to person, place, and time [Affect] : the affect was normal [Mood] : the mood was normal [No Palpable Adenopathy] : no palpable adenopathy Yes

## 2024-01-18 NOTE — ASSESSMENT
[FreeTextEntry1] : 73 year old M PMH CAD s/p stents, ELLIOTT on CPAP who presents for follow up of his sleep apnea.  The patient presents with concerns regarding the management of his sleep apnea, specifically issues with CPAP machine use, snoring, and seeks alternative treatment options. He is also preparing for a dental implant and requires guidance on CPAP use with dental appliances.  Sleep Apnea Plan: 1. Conduct a sleep study to determine the optimal CPAP machine settings and appropriate mask. 2. Consider referral to a special dentist for an oral appliance to advance the tongue and potentially alleviate apnea. 3. Discuss the possibility of insurance coverage for alternative treatments given prior CPAP machine provision. 4. The patient was provided with the addresses of the dentists for the oral appliance consultation.   Snoring Plan: 1. Explain the relationship between untreated sleep apnea and persistent snoring. 2. Explore throat exercises using a special straw-like device to strengthen throat muscles, which may help reduce snoring  Return to Clinic: 1. Schedule an appointment for the sleep study. 2. Arrange a follow-up visit one or two weeks post-sleep study to review results and adjust CPAP settings if necessary.  He was instructed to schedule an appointment for the sleep study and a follow-up visit to review the study results and adjust CPAP settings. The patient did not request further assistance today.

## 2024-01-23 ENCOUNTER — APPOINTMENT (OUTPATIENT)
Dept: CARDIOLOGY | Facility: CLINIC | Age: 74
End: 2024-01-23
Payer: COMMERCIAL

## 2024-01-23 VITALS
WEIGHT: 160 LBS | HEART RATE: 80 BPM | OXYGEN SATURATION: 93 % | HEIGHT: 65 IN | BODY MASS INDEX: 26.66 KG/M2 | DIASTOLIC BLOOD PRESSURE: 72 MMHG | SYSTOLIC BLOOD PRESSURE: 112 MMHG

## 2024-01-23 PROCEDURE — 93000 ELECTROCARDIOGRAM COMPLETE: CPT

## 2024-01-23 PROCEDURE — 99213 OFFICE O/P EST LOW 20 MIN: CPT | Mod: 25

## 2024-01-23 RX ORDER — NITROGLYCERIN 0.4 MG/1
0.4 TABLET SUBLINGUAL
Qty: 90 | Refills: 3 | Status: ACTIVE | COMMUNITY
Start: 2024-01-23 | End: 1900-01-01

## 2024-01-26 NOTE — ASU PREOP CHECKLIST - NEEDLE GAUGE
"Endoscopy Scheduling Screen    Have you had a positive Covid test in the last 14 days?  No      Are you active on MyChart?   Yes      What insurance is in the chart?  Other:  med[employer]      Ordering/Referring Provider: cynthia freitas   (If ordering provider performs procedure, schedule with ordering provider unless otherwise instructed. )    BMI: Estimated body mass index is 44.48 kg/m  as calculated from the following:    Height as of 7/11/23: 1.702 m (5' 7\").    Weight as of 7/11/23: 128.8 kg (284 lb).     Sedation Ordered  moderate sedation.   If patient BMI > 50 do not schedule in [facility].    If patient BMI > 45 do not schedule at [facility].    Are you taking methadone or Suboxone?  No    Have you had difficulties, pain, or discomfort during past endoscopy procedures?  No  Never had a procedure    Are you taking any prescription medications for pain 3 or more times per week?   NO - No RN review required.      Do you have a history of malignant hyperthermia or adverse reaction to anesthesia?  No    (Females) Are you currently pregnant?          Have you been diagnosed or told you have pulmonary hypertension?   No      Do you have an LVAD?  No      Have you been told you have moderate to severe sleep apnea?  No      Have you been told you have COPD, asthma, or any other lung disease?  No      Do you have any heart conditions?  No       Have you ever had an organ transplant?   No      Have you ever had or are you awaiting a heart or lung transplant?   No      In the last 6 months have you had a stroke or transient ischemic attack (TIA aka \"mini stroke\")?   No      Have you been diagnosed with or been told you have cirrhosis of the liver?   No      Are you currently on dialysis?   No      Do you need assistance transferring?   No    BMI: Estimated body mass index is 44.48 kg/m  as calculated from the following:    Height as of 7/11/23: 1.702 m (5' 7\").    Weight as of 7/11/23: 128.8 kg (284 lb).     Is patients BMI > 40 and " scheduling location [facility]?  No      Do you take an injectable medication for weight loss or diabetes (excluding insulin)?  No      Do you take the medication Naltrexone?  No      Do you take blood thinners?  No       Prep   Are you currently on dialysis or do you have chronic kidney disease?  No      Do you have a diagnosis of diabetes?  No      Do you have a diagnosis of cystic fibrosis (CF)?  No      On a regular basis do you go 3 -5 days between bowel movements?  No      BMI > 40?  Yes (Extended Prep)    Preferred Pharmacy:    [id number] - [place], [place] - [address] [place] [address]  Phone: [phone] Fax: [phone]        Final Scheduling Details   Colonoscopy prep sent?  Golytely Extended Prep    Procedure scheduled  Colonoscopy    Surgeon:  CHIQUIS     Date of procedure:  6/21/24     Pre-OP / PAC:   No - Not required for this site.    Location  [facility]  coordinates w/pt schedule    Sedation   Moderate Sedation - Per order.      Patient Reminders:   You will receive a call from a Nurse to review instructions and health history.  This assessment must be completed prior to your procedure.  Failure to complete the Nurse assessment may result in the procedure being cancelled.      On the day of your procedure, please designate an adult(s) who can drive you home stay with you for the next 24 hours. The medicines used in the exam will make you sleepy. You will not be able to drive.      You cannot take public transportation, ride share services, or non-medical taxi service without a responsible caregiver.  Medical transport services are allowed with the requirement that a responsible caregiver will receive you at your destination.  We require that drivers and caregivers are confirmed prior to your procedure.     20

## 2024-01-28 NOTE — HISTORY OF PRESENT ILLNESS
[FreeTextEntry1] : Ernesto is returning for a f/u   Feels well No Chest pain or dyspnea   No syncope or palpitations No falls or blackouts No LE edema  Continues to work without symptoms

## 2024-01-28 NOTE — DISCUSSION/SUMMARY
[FreeTextEntry1] : 74 y/o man with prior NSTEMI s/p PCI with YARIEL to pLAD and OM1. Preserved LV function.   CAD - feels well.  No changes to meds. HLD- c/w current meds labs with pcp F/u 6 mo [EKG obtained to assist in diagnosis and management of assessed problem(s)] : EKG obtained to assist in diagnosis and management of assessed problem(s)

## 2024-03-18 ENCOUNTER — APPOINTMENT (OUTPATIENT)
Dept: GASTROENTEROLOGY | Facility: CLINIC | Age: 74
End: 2024-03-18
Payer: MEDICARE

## 2024-03-18 VITALS
BODY MASS INDEX: 26.33 KG/M2 | WEIGHT: 158 LBS | HEART RATE: 119 BPM | RESPIRATION RATE: 18 BRPM | HEIGHT: 65 IN | OXYGEN SATURATION: 97 % | DIASTOLIC BLOOD PRESSURE: 67 MMHG | SYSTOLIC BLOOD PRESSURE: 113 MMHG

## 2024-03-18 DIAGNOSIS — K59.00 CONSTIPATION, UNSPECIFIED: ICD-10-CM

## 2024-03-18 PROCEDURE — 99213 OFFICE O/P EST LOW 20 MIN: CPT

## 2024-03-18 PROCEDURE — G2211 COMPLEX E/M VISIT ADD ON: CPT

## 2024-03-18 NOTE — HISTORY OF PRESENT ILLNESS
[FreeTextEntry1] :  # [id number]   Here for f/u visit Last seen August 2023   Saw colorectal surgery, s/p banding w Dr Bryant   Still has constipation, gas, bloat  2-3 bms per day Stool is hard  Has not been taking miralax daily because he forgets States that when he does remember to take it daily, all symptoms improve  Has intermittent hematochezia with wiping, improved since banding procedure   Denies abd pain, n/v, wt loss

## 2024-03-18 NOTE — ASSESSMENT
[FreeTextEntry1] : 73M w CAD s/p multiple PCI -- ?unclear if taking DAPT (patient not sure of all of his meds), ELLIOTT on cpap, hepatic steatosis, here today to establish care. Symptoms of bloating and gas x many years, seen by multiple prior GIs including Dr Paredes in 2021. No fhx of GI malignancy.  EGD 2020 - Dr Yi - hiatal hernia, gastritis. path = severe active chronic gastritis, + hpylori Colonoscopy 2020 - Dr Yi - hemorrhoids, polyp in descending colon. Path - tubular adenoma. Told to repeat in 3 yrs  # Gas # Bloating # Constipation - EGD/Colon reviewed as above - all symptoms improve when he takes his bowel regimen -- advised to take miralax daily  - increase dietary soluble fiber and hydration   # Hemorrhoids # Hematochezia - rectal pain and bleeding likely 2/2 hemorrhoids - cscope as above - continue prn preparation H - calmol 4 suppositories - saw colorectal surgery, s/p banding. Will f/u    # Hepatic steatosis Noted on abd u/s Labs - AST 23, ALT 39, ALP 82, Tbili 1 Healthy eating and exercise reviewed  # Colon cancer screening - prior cscope reviewed as above - no fhx of colon cancer - patient would like to defer cscope until after colorectal surgery eval - will discuss surveillance cscope at next visit   RTC 6-8 wks

## 2024-03-28 ENCOUNTER — APPOINTMENT (OUTPATIENT)
Dept: SURGERY | Facility: CLINIC | Age: 74
End: 2024-03-28
Payer: MEDICARE

## 2024-03-28 VITALS
SYSTOLIC BLOOD PRESSURE: 121 MMHG | OXYGEN SATURATION: 98 % | TEMPERATURE: 97.1 F | HEIGHT: 65 IN | WEIGHT: 158 LBS | DIASTOLIC BLOOD PRESSURE: 75 MMHG | HEART RATE: 58 BPM | RESPIRATION RATE: 18 BRPM | BODY MASS INDEX: 26.33 KG/M2

## 2024-03-28 DIAGNOSIS — K62.5 HEMORRHAGE OF ANUS AND RECTUM: ICD-10-CM

## 2024-03-28 PROCEDURE — 46221 LIGATION OF HEMORRHOID(S): CPT

## 2024-03-28 NOTE — ASSESSMENT
[FreeTextEntry1] : Patient with residual hemorrhoid symptoms.  I recommended banding.  Patient agreed.  Right posterior hemorrhoid banded.  Post band instruction sheet reviewed.  Follow-up 1 to 2 months if any remaining symptoms.

## 2024-03-28 NOTE — REASON FOR VISIT
[Follow-Up: _____] : a [unfilled] follow-up visit [Interpreters_IDNumber] : [id number] [Interpreters_FullName] : Deirdre [TWNoteComboBox1] : [place] Islander

## 2024-03-28 NOTE — HISTORY OF PRESENT ILLNESS
[FreeTextEntry1] : Ernesto is a 72 year old male here for a follow up visit, RBL   s/p RBL left lateral on 09/21/23   Colonoscopy on 2/10/20 - Hemorrhoids.  Last seen 09/21/23 - I have seen and evaluated patient and I have corroborated all nursing input into this note. Patient with large grade 2 hemorrhoids. I recommended rubber band ligation. Indications, risks, benefits, alternatives reviewed including but not limited to bleeding, infection, and failure. All questions answered. Patient agreed. Left lateral hemorrhoid banded. Post band instruction sheet reviewed. Patient will return to the office in 1 month if his symptoms continue. The right posterior hemorrhoid can be banded at that time.  Today pt reports no pain.  Daily 3 times BMs, formed, rare rectal bleeding into tp when wiping twice a week.   Denies any prolapsing tissue or swelling.  No episodes of incontinence of stool or flatus.  Taking baby aspirin, has 3 cardiac stents.

## 2024-03-28 NOTE — PHYSICAL EXAM
[FreeTextEntry1] : Perianal inspection unremarkable.  Digital exam with hemorrhoid enlargement.  Right posterior hemorrhoid largest on anoscopy.   Anoscopy performed to evaluate internal hemorrhoids.  No sedation required.

## 2024-04-20 ENCOUNTER — APPOINTMENT (OUTPATIENT)
Dept: SLEEP CENTER | Facility: CLINIC | Age: 74
End: 2024-04-20

## 2024-04-25 ENCOUNTER — APPOINTMENT (OUTPATIENT)
Dept: OPHTHALMOLOGY | Facility: CLINIC | Age: 74
End: 2024-04-25
Payer: MEDICARE

## 2024-04-25 ENCOUNTER — NON-APPOINTMENT (OUTPATIENT)
Age: 74
End: 2024-04-25

## 2024-04-25 PROCEDURE — 92014 COMPRE OPH EXAM EST PT 1/>: CPT

## 2024-04-26 NOTE — H&P CARDIOLOGY - NS NEC GEN PE MLT EXAM PC
Spoke with patient.  He is willing to schedule but wants to talk with his doctor first.   The date given will not work as he has 2 other appointments on 5/20/24.  Will send a message to Dr. Mcmillan's nurse to advise on an alternative date.  Will call patient back then.  Patient states it is ok to leave a detailed message on his voicemail also.    No bruits; no thyromegaly or nodules

## 2024-04-29 ENCOUNTER — APPOINTMENT (OUTPATIENT)
Dept: UROLOGY | Facility: CLINIC | Age: 74
End: 2024-04-29
Payer: MEDICARE

## 2024-04-29 DIAGNOSIS — N40.0 BENIGN PROSTATIC HYPERPLASIA WITHOUT LOWER URINARY TRACT SYMPMS: ICD-10-CM

## 2024-04-29 DIAGNOSIS — R30.0 DYSURIA: ICD-10-CM

## 2024-04-29 PROCEDURE — 99213 OFFICE O/P EST LOW 20 MIN: CPT

## 2024-04-29 RX ORDER — TAMSULOSIN HYDROCHLORIDE 0.4 MG/1
0.4 CAPSULE ORAL
Qty: 90 | Refills: 3 | Status: ACTIVE | COMMUNITY
Start: 2024-04-29 | End: 1900-01-01

## 2024-04-30 NOTE — ASSESSMENT
[FreeTextEntry1] : 73M here for f/u for BPH/LUTS and dysuria  #BPH/LUTS -Pt to restart Flomax- script sent; reviewed side effects at length - PVR minimal today  #Dysuria -UA and UCx sent  Pt to RTO in 1 year or as needed for symptom check/arslan't.

## 2024-04-30 NOTE — REASON FOR VISIT
[Follow-up Visit ___] : a follow-up visit  for [unfilled] [Interpreters_IDNumber] : [id number] [Interpreters_FullName] : Becky [TWNoteComboBox1] : Ken[place]

## 2024-04-30 NOTE — PHYSICAL EXAM
[General Appearance - Well Developed] : well developed [General Appearance - Well Nourished] : well nourished [Heart Rate And Rhythm] : heart rate and rhythm were normal [] : no respiratory distress [Abdomen Soft] : soft [Testes Tenderness] : no tenderness of the testes [Testes Mass (___cm)] : there were no testicular masses [Normal Station and Gait] : the gait and station were normal for the patient's age [Skin Color & Pigmentation] : normal skin color and pigmentation [No Focal Deficits] : no focal deficits [Oriented To Time, Place, And Person] : oriented to person, place, and time [Affect] : the affect was normal [de-identified] : Anus/perineum skin with abrasions and hemmorioids - pt to f/u with PCP

## 2024-04-30 NOTE — HISTORY OF PRESENT ILLNESS
[Nocturia] : nocturia [Straining] : straining [Weak Stream] : weak stream [Post-Void Dribbling] : post-void dribbling [Dysuria] : dysuria [None] : None [FreeTextEntry1] : : Oct 21 1950  Referring Provider: none   HPI: Mr. PJ ANDRADE is a 71 year yo M with a PMHx notable for BPH, s/p prior ablation 6 years ago and a Rezum 2 years ago. At that time he was having urinary frequency and nocturia and was on Flomax. Procedure was done at [place]. He now presents to establish care and follow up of his prostate - had insurance issues with previous provider so presents today to establish care. He has no urinary complaints, is continent, no frequency, urgency, nocturia, or feelings of incomplete emptying.   He also asks abut his reproductive function should he choose to have another child, son  at young age. No ED but mentions that he does not produce much ejaculate (likely 2/2 prostate procedures). He mentions some occasional, slight feeling of pain/irritation in his bladder that he rates 1/10. PVR 3ml.   Anticoagulation: None All: NKDA Social: . Retired . Never smoker. No EtOH. Had 1 son who passed away age 41.  PMHx: No medications.  FHx: No prostate cancer. Kidney cancer in older sister 12 years ago PSHx: Prostate "ablation" 6 years ago and Rezum 2 years ago  24 Pt presents for f/u - hx of CaP- last PSA 2.17 ()- avg risk. Pt reports having some dysuria and feeling of incomplete emptying with some post-void leaking. PVR = 0cc. Pt stopped Flomax- felt it was draining him- unsure if related to feeling low blood pressure- discussed possible side effects and can discontinue if needed as pt is emptying well. Pt to have UA and UCx sent out for dysuria.  [Urinary Incontinence] : no urinary incontinence [Urinary Retention] : no urinary retention [Urinary Urgency] : no urinary urgency [Urinary Frequency] : no urinary frequency [Intermittency] : no intermittency [Hematuria - Gross] : no gross hematuria

## 2024-05-02 ENCOUNTER — APPOINTMENT (OUTPATIENT)
Dept: SURGERY | Facility: CLINIC | Age: 74
End: 2024-05-02
Payer: MEDICARE

## 2024-05-02 VITALS
OXYGEN SATURATION: 99 % | RESPIRATION RATE: 17 BRPM | SYSTOLIC BLOOD PRESSURE: 102 MMHG | HEART RATE: 60 BPM | DIASTOLIC BLOOD PRESSURE: 64 MMHG | TEMPERATURE: 98.1 F

## 2024-05-02 DIAGNOSIS — K64.1 SECOND DEGREE HEMORRHOIDS: ICD-10-CM

## 2024-05-02 DIAGNOSIS — L29.0 PRURITUS ANI: ICD-10-CM

## 2024-05-02 PROCEDURE — 46221 LIGATION OF HEMORRHOID(S): CPT

## 2024-05-02 PROCEDURE — 99213 OFFICE O/P EST LOW 20 MIN: CPT | Mod: 25

## 2024-05-02 NOTE — REASON FOR VISIT
[Follow-Up: _____] : a [unfilled] follow-up visit [Source: ______] : History obtained from [unfilled] [Interpreters_IDNumber] : [id number] [Interpreters_FullName] : Humaira

## 2024-05-02 NOTE — PHYSICAL EXAM
[FreeTextEntry1] : Perianal inspection and digital exam unremarkable.  Anoscopy with enlarged right posterior hemorrhoid.  Anoscopy performed to evaluate internal hemorrhoids.  No sedation required.

## 2024-05-02 NOTE — ASSESSMENT
[FreeTextEntry1] : Patient has recurrent perianal swelling and itching.  He has an enlarged right posterior hemorrhoid.  I recommended banding.  Patient agreed.  Post band instruction sheet reviewed.  I also reviewed perianal hygiene for his chronic itch.  Patient will follow-up as needed.

## 2024-05-02 NOTE — HISTORY OF PRESENT ILLNESS
[FreeTextEntry1] : Ernesto is a 72 year old male here for a follow up visit, RBL, pt Azeri speaking  s/p RBL left lateral on 09/21/23 s/p RBL right posterior on 03/28/24   Colonoscopy on 2/10/20 - Hemorrhoids.  Last seen 03/28/24 - Patient with residual hemorrhoid symptoms. I recommended banding. Patient agreed. Right posterior hemorrhoid banded. Post band instruction sheet reviewed. Follow-up 1 to 2 months if any remaining symptoms.   Today pt reports feeling well no rectal pain.  Endorses anal irritation and itchiness.  Soft BMs 2-3 times daily, sometimes constipated.   Intermittent bleeding on toilet paper with hard BMs, last rectal bleeding episode a week ago.  Feels prolapsing tissue that goes back on it own.  No incontinence of stool.  Taking Baby aspirin has 3 cardiac stents, didn't take it today.

## 2024-05-06 LAB
APPEARANCE: CLEAR
BACTERIA UR CULT: NORMAL
BACTERIA: NEGATIVE /HPF
BILIRUBIN URINE: NEGATIVE
BLOOD URINE: NEGATIVE
CAST: 1 /LPF
COLOR: YELLOW
EPITHELIAL CELLS: 1 /HPF
GLUCOSE QUALITATIVE U: NEGATIVE MG/DL
KETONES URINE: NEGATIVE MG/DL
LEUKOCYTE ESTERASE URINE: NEGATIVE
MICROSCOPIC-UA: NORMAL
NITRITE URINE: NEGATIVE
PH URINE: 6
PROTEIN URINE: NEGATIVE MG/DL
RED BLOOD CELLS URINE: 4 /HPF
SPECIFIC GRAVITY URINE: 1.03
UROBILINOGEN URINE: 1 MG/DL
WHITE BLOOD CELLS URINE: 0 /HPF

## 2024-05-07 ENCOUNTER — APPOINTMENT (OUTPATIENT)
Dept: INTERNAL MEDICINE | Facility: CLINIC | Age: 74
End: 2024-05-07
Payer: MEDICARE

## 2024-05-07 VITALS
DIASTOLIC BLOOD PRESSURE: 64 MMHG | HEART RATE: 87 BPM | BODY MASS INDEX: 27.32 KG/M2 | TEMPERATURE: 98.2 F | HEIGHT: 65 IN | SYSTOLIC BLOOD PRESSURE: 112 MMHG | WEIGHT: 164 LBS | OXYGEN SATURATION: 97 %

## 2024-05-07 DIAGNOSIS — Z98.61 ATHEROSCLEROTIC HEART DISEASE OF NATIVE CORONARY ARTERY W/OUT ANGINA PECTORIS: ICD-10-CM

## 2024-05-07 DIAGNOSIS — E78.2 MIXED HYPERLIPIDEMIA: ICD-10-CM

## 2024-05-07 DIAGNOSIS — I25.10 ATHEROSCLEROTIC HEART DISEASE OF NATIVE CORONARY ARTERY W/OUT ANGINA PECTORIS: ICD-10-CM

## 2024-05-07 DIAGNOSIS — R73.03 PREDIABETES.: ICD-10-CM

## 2024-05-07 PROCEDURE — 99214 OFFICE O/P EST MOD 30 MIN: CPT

## 2024-05-07 RX ORDER — ATORVASTATIN CALCIUM 40 MG/1
40 TABLET, FILM COATED ORAL DAILY
Qty: 90 | Refills: 2 | Status: ACTIVE | COMMUNITY
Start: 2021-04-01 | End: 1900-01-01

## 2024-05-07 RX ORDER — FLUTICASONE PROPIONATE 50 UG/1
50 SPRAY, METERED NASAL DAILY
Qty: 1 | Refills: 1 | Status: ACTIVE | COMMUNITY
Start: 2024-05-07 | End: 1900-01-01

## 2024-05-08 PROBLEM — R73.03 PREDIABETES: Status: ACTIVE | Noted: 2020-11-12

## 2024-05-08 PROBLEM — E78.2 MIXED DYSLIPIDEMIA: Status: ACTIVE | Noted: 2022-04-14

## 2024-05-08 PROBLEM — I25.10 CAD S/P PERCUTANEOUS CORONARY ANGIOPLASTY: Status: ACTIVE | Noted: 2020-11-12

## 2024-05-08 NOTE — HISTORY OF PRESENT ILLNESS
[FreeTextEntry1] : Follow up [de-identified] : Mr. ANDRADE is a 73 year old male who presents to the office for follow up: Pt saw urology recently - nocturia, straining, weak stream, post-void dribbling and dysuria  Pt s/p banding procedure w/ colorectal surgery Pt requests vascular surgery referral  Medications reviewed and renewed  Upcoming CPE

## 2024-05-08 NOTE — PHYSICAL EXAM
[No Acute Distress] : no acute distress [Well Nourished] : well nourished [Well Developed] : well developed [Well-Appearing] : well-appearing [Normal Sclera/Conjunctiva] : normal sclera/conjunctiva [PERRL] : pupils equal round and reactive to light [EOMI] : extraocular movements intact [Normal Outer Ear/Nose] : the outer ears and nose were normal in appearance [Normal Oropharynx] : the oropharynx was normal [No JVD] : no jugular venous distention [No Lymphadenopathy] : no lymphadenopathy [Supple] : supple [Thyroid Normal, No Nodules] : the thyroid was normal and there were no nodules present [No Respiratory Distress] : no respiratory distress  [No Accessory Muscle Use] : no accessory muscle use [Clear to Auscultation] : lungs were clear to auscultation bilaterally [Normal Rate] : normal rate  [Regular Rhythm] : with a regular rhythm [Normal S1, S2] : normal S1 and S2 [No Carotid Bruits] : no carotid bruits [No Abdominal Bruit] : a ~M bruit was not heard ~T in the abdomen [No Varicosities] : no varicosities [Pedal Pulses Present] : the pedal pulses are present [No Edema] : there was no peripheral edema [No Palpable Aorta] : no palpable aorta [No Extremity Clubbing/Cyanosis] : no extremity clubbing/cyanosis [Non Tender] : non-tender [Soft] : abdomen soft [Non-distended] : non-distended [No Masses] : no abdominal mass palpated [No HSM] : no HSM [Normal Bowel Sounds] : normal bowel sounds [Normal Posterior Cervical Nodes] : no posterior cervical lymphadenopathy [Normal Anterior Cervical Nodes] : no anterior cervical lymphadenopathy [No Spinal Tenderness] : no spinal tenderness [No CVA Tenderness] : no CVA  tenderness [No Joint Swelling] : no joint swelling [Grossly Normal Strength/Tone] : grossly normal strength/tone [No Rash] : no rash [Coordination Grossly Intact] : coordination grossly intact [No Focal Deficits] : no focal deficits [Normal Gait] : normal gait [Normal Affect] : the affect was normal [Normal Insight/Judgement] : insight and judgment were intact

## 2024-05-16 ENCOUNTER — NON-APPOINTMENT (OUTPATIENT)
Age: 74
End: 2024-05-16

## 2024-05-16 ENCOUNTER — APPOINTMENT (OUTPATIENT)
Dept: OPHTHALMOLOGY | Facility: CLINIC | Age: 74
End: 2024-05-16
Payer: MEDICARE

## 2024-05-16 PROCEDURE — 92083 EXTENDED VISUAL FIELD XM: CPT

## 2024-05-16 PROCEDURE — 92014 COMPRE OPH EXAM EST PT 1/>: CPT

## 2024-05-22 ENCOUNTER — APPOINTMENT (OUTPATIENT)
Dept: VASCULAR SURGERY | Facility: CLINIC | Age: 74
End: 2024-05-22
Payer: MEDICARE

## 2024-05-22 VITALS
HEIGHT: 65 IN | HEART RATE: 63 BPM | WEIGHT: 162 LBS | SYSTOLIC BLOOD PRESSURE: 115 MMHG | BODY MASS INDEX: 26.99 KG/M2 | DIASTOLIC BLOOD PRESSURE: 69 MMHG | TEMPERATURE: 97.9 F

## 2024-05-22 PROCEDURE — 99203 OFFICE O/P NEW LOW 30 MIN: CPT

## 2024-05-22 NOTE — HISTORY OF PRESENT ILLNESS
[FreeTextEntry1] : Patient is a 72 yo M who presents to the office for evaluation of lower extremity cramping. Patient with history of LLE reflux and SSV ablation years ago. Pt states he experiences intermittent cramping pain in his left calf. He states it happens about once a month and is not associated with the distance he walks. He states the cramping is severe that he can't put weight on his foot. He states he works out three times a week in his legs and has no problems. He denies any rest pain or wounds of his lower extremity. He denies any swelling or feeling of fatigue/heaviness in his legs

## 2024-05-22 NOTE — ASSESSMENT
[FreeTextEntry1] : Patient is a 72 yo M with history of LLE cramping and some foot numbness  - Patient with palpable pedal pulses, no signs of arterial insufficiency  - No clinical symptoms of venous insufficiency  - Patient with non-reproducible intermittent cramping, will recommend neurology evaluation  - Follow up PRN

## 2024-05-22 NOTE — PHYSICAL EXAM
[2+] : left 2+ [Alert] : alert [Oriented to Person] : oriented to person [Oriented to Place] : oriented to place [Oriented to Time] : oriented to time [Ankle Swelling (On Exam)] : not present [Varicose Veins Of Lower Extremities] : not present [] : not present [de-identified] : NAD [FreeTextEntry1] : B/l palpable pedal pulses, no wounds, no ischemic changes  [de-identified] : no clinical signs of venous insufficiency

## 2024-05-28 ENCOUNTER — NON-APPOINTMENT (OUTPATIENT)
Age: 74
End: 2024-05-28

## 2024-05-29 RX ORDER — METOPROLOL SUCCINATE 25 MG/1
25 TABLET, EXTENDED RELEASE ORAL
Qty: 90 | Refills: 1 | Status: ACTIVE | COMMUNITY
Start: 2019-02-13 | End: 1900-01-01

## 2024-06-04 ENCOUNTER — APPOINTMENT (OUTPATIENT)
Dept: INTERNAL MEDICINE | Facility: CLINIC | Age: 74
End: 2024-06-04
Payer: MEDICARE

## 2024-06-04 VITALS
HEART RATE: 84 BPM | TEMPERATURE: 98.5 F | SYSTOLIC BLOOD PRESSURE: 119 MMHG | WEIGHT: 162 LBS | DIASTOLIC BLOOD PRESSURE: 64 MMHG | HEIGHT: 65 IN | OXYGEN SATURATION: 98 % | BODY MASS INDEX: 26.99 KG/M2

## 2024-06-04 PROCEDURE — 99214 OFFICE O/P EST MOD 30 MIN: CPT

## 2024-06-04 RX ORDER — TIZANIDINE 2 MG/1
2 TABLET ORAL
Qty: 30 | Refills: 0 | Status: ACTIVE | COMMUNITY
Start: 2024-06-04 | End: 1900-01-01

## 2024-06-04 RX ORDER — MELOXICAM 15 MG/1
15 TABLET ORAL DAILY
Qty: 20 | Refills: 0 | Status: ACTIVE | COMMUNITY
Start: 2024-06-04 | End: 1900-01-01

## 2024-06-10 ENCOUNTER — APPOINTMENT (OUTPATIENT)
Dept: ORTHOPEDIC SURGERY | Facility: CLINIC | Age: 74
End: 2024-06-10
Payer: MEDICARE

## 2024-06-10 VITALS — HEART RATE: 89 BPM | DIASTOLIC BLOOD PRESSURE: 73 MMHG | SYSTOLIC BLOOD PRESSURE: 114 MMHG

## 2024-06-10 DIAGNOSIS — Z87.39 PERSONAL HISTORY OF OTHER DISEASES OF THE MUSCULOSKELETAL SYSTEM AND CONNECTIVE TISSUE: ICD-10-CM

## 2024-06-10 DIAGNOSIS — M54.6 PAIN IN THORACIC SPINE: ICD-10-CM

## 2024-06-10 DIAGNOSIS — Z86.39 PERSONAL HISTORY OF OTHER ENDOCRINE, NUTRITIONAL AND METABOLIC DISEASE: ICD-10-CM

## 2024-06-10 PROCEDURE — 72110 X-RAY EXAM L-2 SPINE 4/>VWS: CPT

## 2024-06-10 PROCEDURE — 99204 OFFICE O/P NEW MOD 45 MIN: CPT

## 2024-06-10 RX ORDER — TIZANIDINE 4 MG/1
4 TABLET ORAL EVERY 8 HOURS
Qty: 30 | Refills: 0 | Status: ACTIVE | COMMUNITY
Start: 2024-06-10 | End: 1900-01-01

## 2024-06-10 NOTE — PHYSICAL EXAM
[de-identified] : Gen: NAD Resp: Nonlabored respirations Gait: Nl Head: CN I - XII grossly intact  Spine: Skin in tact, tender paraspinal region Full neck ROM UE: 5/5 D B T WE WF IO b/l SILT C4-T1 b/l (-)Lola Kc No hand atrophy 2+ rad bcr  LE: 5/5 IP Q HS EHL TA GS SILT L3-S1 b/l (-) clonus, (-) babinski (-) slr, c/l slr 2+ dp pt wwp bcr  [de-identified] : The following radiographs were ordered and read by me during this patient's visit. I reviewed each radiograph in detail with the patient and discussed the findings as highlighted below.   4 views of the lumbar spine were obtained today that show no fracture, or dislocation. There are no degenerative changes seen. There is no malalignment or dynamic instability. Preserved lordosis.  No obvious osseous abnormality. Otherwise unremarkable.

## 2024-06-10 NOTE — DISCUSSION/SUMMARY
[de-identified] : 73yM pw L back strain.  The patient was extensively counseled on treatment options including but not limited to observation, rest/activity modification, bracing, anti-inflammatory medications, physical therapy, injections, and surgery.  The natural history of the disease was thoroughly explained.  We discussed that the majority of the time, this condition can be initially treated conservatively. The patient will proceed with: -PT -tizanidine rx, continue meloxicam -pt was instructed on the importance of resting, icing and elevating to minimize swelling -RTC 6w   I have personally obtained the history, reviewed the ROS as noted, and performed the physical examination today.  The patient and I discussed the assessment and options and developed the plan.  All questions were answered and the patient stated their understanding of the treatment plan and appreciation of the visit.   My cumulative time spent on this patient's visit included: Preparation for the visit, review of the medical records, review of pertinent diagnostic studies, examination and counseling of the patient on the above diagnosis, treatment plan and prognosis, orders of diagnostic tests, medications and/or appropriate procedures and documentation in the medical records of today's visit.   Ben Berumen MD

## 2024-06-10 NOTE — HISTORY OF PRESENT ILLNESS
[de-identified] : Ernesto is a 74yo Male pw lower back pain.  Pt felt a twisting injury during cleaning.  Pt notes extending sitting and ascending/descending stairs worsens the pain and symptoms.  Pt has tried activity modification and NSAIDs with minimal relief, pain level remains a 6/10.  Also notes increased stiffness/worse motion in the back.  Has not trialed physical therapy or injections.  Pt denies numbness, paresthesias, f/c.  No BBI or saddle anesthesia, no clumsiness or change in motor function.  No hx of spinal surgery. Pt notes the pain interferes with activities of daily life and that overall mobility has been decreased.  They wish to discuss possible treatment options and return to baseline activity and mobility before symptomatic onset.  Works at hotel cleaning.

## 2024-06-13 NOTE — PHYSICAL EXAM
[No Acute Distress] : no acute distress [Well Nourished] : well nourished [Well Developed] : well developed [Well-Appearing] : well-appearing [Normal Sclera/Conjunctiva] : normal sclera/conjunctiva [PERRL] : pupils equal round and reactive to light [EOMI] : extraocular movements intact [Normal Outer Ear/Nose] : the outer ears and nose were normal in appearance [Normal Oropharynx] : the oropharynx was normal [No JVD] : no jugular venous distention [No Lymphadenopathy] : no lymphadenopathy [Supple] : supple [Thyroid Normal, No Nodules] : the thyroid was normal and there were no nodules present [No Respiratory Distress] : no respiratory distress  [No Accessory Muscle Use] : no accessory muscle use [Clear to Auscultation] : lungs were clear to auscultation bilaterally [Normal Rate] : normal rate  [Regular Rhythm] : with a regular rhythm [Normal S1, S2] : normal S1 and S2 [No Carotid Bruits] : no carotid bruits [No Abdominal Bruit] : a ~M bruit was not heard ~T in the abdomen [Pedal Pulses Present] : the pedal pulses are present [No Palpable Aorta] : no palpable aorta [No Extremity Clubbing/Cyanosis] : no extremity clubbing/cyanosis [Soft] : abdomen soft [Non Tender] : non-tender [Non-distended] : non-distended [No Masses] : no abdominal mass palpated [No HSM] : no HSM [Normal Bowel Sounds] : normal bowel sounds [Normal Posterior Cervical Nodes] : no posterior cervical lymphadenopathy [Normal Anterior Cervical Nodes] : no anterior cervical lymphadenopathy [No CVA Tenderness] : no CVA  tenderness [No Spinal Tenderness] : no spinal tenderness [No Joint Swelling] : no joint swelling [Grossly Normal Strength/Tone] : grossly normal strength/tone [No Rash] : no rash [Coordination Grossly Intact] : coordination grossly intact [No Focal Deficits] : no focal deficits [Normal Gait] : normal gait [Normal Affect] : the affect was normal [Normal Insight/Judgement] : insight and judgment were intact

## 2024-06-13 NOTE — HISTORY OF PRESENT ILLNESS
[FreeTextEntry1] : Follow up [de-identified] : Mr. ANDRADE is a 73 year old male who presents to the office for follow up on back pain: Left thoracic back pain x10 days Pt using hot water/pain patches without much relief Pt works as a  at a hotel - likely exacerbating/cause Pt w/ muscle spasm on exam   MR- tizanidine PRN Meloxicam PRN, take with food

## 2024-07-02 ENCOUNTER — APPOINTMENT (OUTPATIENT)
Dept: MRI IMAGING | Facility: CLINIC | Age: 74
End: 2024-07-02

## 2024-07-02 ENCOUNTER — OUTPATIENT (OUTPATIENT)
Dept: OUTPATIENT SERVICES | Facility: HOSPITAL | Age: 74
LOS: 1 days | End: 2024-07-02
Payer: MEDICARE

## 2024-07-02 DIAGNOSIS — S82.91XA UNSPECIFIED FRACTURE OF RIGHT LOWER LEG, INITIAL ENCOUNTER FOR CLOSED FRACTURE: Chronic | ICD-10-CM

## 2024-07-02 DIAGNOSIS — Z00.8 ENCOUNTER FOR OTHER GENERAL EXAMINATION: ICD-10-CM

## 2024-07-02 PROCEDURE — G1004: CPT

## 2024-07-02 PROCEDURE — 72148 MRI LUMBAR SPINE W/O DYE: CPT | Mod: ME

## 2024-07-02 PROCEDURE — 72148 MRI LUMBAR SPINE W/O DYE: CPT | Mod: 26,ME

## 2024-07-06 ENCOUNTER — APPOINTMENT (OUTPATIENT)
Dept: SLEEP CENTER | Facility: CLINIC | Age: 74
End: 2024-07-06
Payer: COMMERCIAL

## 2024-07-06 ENCOUNTER — OUTPATIENT (OUTPATIENT)
Dept: OUTPATIENT SERVICES | Facility: HOSPITAL | Age: 74
LOS: 1 days | End: 2024-07-06
Payer: MEDICARE

## 2024-07-06 DIAGNOSIS — S82.91XA UNSPECIFIED FRACTURE OF RIGHT LOWER LEG, INITIAL ENCOUNTER FOR CLOSED FRACTURE: Chronic | ICD-10-CM

## 2024-07-06 PROCEDURE — 95811 POLYSOM 6/>YRS CPAP 4/> PARM: CPT | Mod: 26

## 2024-07-06 PROCEDURE — 95811 POLYSOM 6/>YRS CPAP 4/> PARM: CPT

## 2024-07-16 DIAGNOSIS — G47.33 OBSTRUCTIVE SLEEP APNEA (ADULT) (PEDIATRIC): ICD-10-CM

## 2024-07-24 ENCOUNTER — APPOINTMENT (OUTPATIENT)
Dept: ORTHOPEDIC SURGERY | Facility: CLINIC | Age: 74
End: 2024-07-24
Payer: COMMERCIAL

## 2024-07-24 DIAGNOSIS — M54.50 LOW BACK PAIN, UNSPECIFIED: ICD-10-CM

## 2024-07-24 PROCEDURE — 99213 OFFICE O/P EST LOW 20 MIN: CPT

## 2024-07-24 PROCEDURE — 99203 OFFICE O/P NEW LOW 30 MIN: CPT

## 2024-07-25 NOTE — ED PROVIDER NOTE - RATE
HH orders released to [facility] and writer informed Betty from [facility] that patient was discharging home.    Shave Biopsy Instructions    For the biopsied area, leave the bandaid on for 24 hours. After 24 hours, wash the biopsied area with soap and water and then put on a Aquaphor, Vasaline, Vaniply, or plain petroleum jelly and cover a bandaid. Wash and replace the ointment and a bandaid every day until the area heals. If you develop spreading redness, pus, or tenderness, please call the clinic. You may experience some mild itching as the skin heals. Please try to avoid scratching the area.     If the biopsy area were to start to bleed, apply firm direct pressure for 15 minutes without peeking. If after 15 minutes, the area is still bleeding, you can repeat the 15 minutes of pressure for 2 more times. But, if after 45 minutes, it is still bleeding, please call the clinic or go to urgent care/the emergency department.     It may take up to 2 weeks to get a result from the biopsy taken today. If you have not received a message or notification of the result after 2 weeks, please call our office.     For the forearms and the tops of the hands   Mix a pea sized amount of Efudex and a pea sized amount of Calcipotriene and apply it to the forearm and the top of the hand. Treat one side for 4-7 days, if after 5 days the redness and tenderness becomes too much okay to stop after five days.   These creams will cause irritation and redness so you can expect these reactions but it should never be to the point that you blister or cannot tolerate the pain.     60

## 2024-07-30 ENCOUNTER — NON-APPOINTMENT (OUTPATIENT)
Age: 74
End: 2024-07-30

## 2024-07-30 ENCOUNTER — APPOINTMENT (OUTPATIENT)
Dept: CARDIOLOGY | Facility: CLINIC | Age: 74
End: 2024-07-30
Payer: MEDICARE

## 2024-07-30 VITALS
HEIGHT: 65 IN | DIASTOLIC BLOOD PRESSURE: 67 MMHG | WEIGHT: 162 LBS | BODY MASS INDEX: 26.99 KG/M2 | HEART RATE: 72 BPM | SYSTOLIC BLOOD PRESSURE: 110 MMHG | OXYGEN SATURATION: 98 %

## 2024-07-30 DIAGNOSIS — E78.2 MIXED HYPERLIPIDEMIA: ICD-10-CM

## 2024-07-30 DIAGNOSIS — Z98.61 ATHEROSCLEROTIC HEART DISEASE OF NATIVE CORONARY ARTERY W/OUT ANGINA PECTORIS: ICD-10-CM

## 2024-07-30 DIAGNOSIS — I25.10 ATHEROSCLEROTIC HEART DISEASE OF NATIVE CORONARY ARTERY W/OUT ANGINA PECTORIS: ICD-10-CM

## 2024-07-30 PROCEDURE — 99214 OFFICE O/P EST MOD 30 MIN: CPT

## 2024-07-30 PROCEDURE — 93000 ELECTROCARDIOGRAM COMPLETE: CPT

## 2024-08-01 NOTE — DISCUSSION/SUMMARY
[FreeTextEntry1] : 74 y/o man with prior NSTEMI s/p PCI with YARIEL to pLAD and OM1. Preserved LV function.   CAD - his fatigue maybe related to CAD, although he feels no CP. I offered him a repeat cath (had a stress test this year) - but he would like to continue to monitor his symptoms and assess again in a few months. HLD- c/w current meds labs with pcp F/u 6 mo [EKG obtained to assist in diagnosis and management of assessed problem(s)] : EKG obtained to assist in diagnosis and management of assessed problem(s)

## 2024-08-01 NOTE — HISTORY OF PRESENT ILLNESS
[FreeTextEntry1] : Ernesto is returning for a f/u   c/o fatigue No Chest pain or dyspnea   No syncope or palpitations No falls or blackouts No LE edema  Continues to work (nights) without symptoms

## 2024-09-05 ENCOUNTER — APPOINTMENT (OUTPATIENT)
Dept: PULMONOLOGY | Facility: CLINIC | Age: 74
End: 2024-09-05
Payer: COMMERCIAL

## 2024-09-05 VITALS
WEIGHT: 162 LBS | HEIGHT: 65 IN | HEART RATE: 75 BPM | TEMPERATURE: 98 F | BODY MASS INDEX: 26.99 KG/M2 | DIASTOLIC BLOOD PRESSURE: 60 MMHG | RESPIRATION RATE: 15 BRPM | SYSTOLIC BLOOD PRESSURE: 98 MMHG | OXYGEN SATURATION: 95 %

## 2024-09-05 DIAGNOSIS — G47.33 OBSTRUCTIVE SLEEP APNEA (ADULT) (PEDIATRIC): ICD-10-CM

## 2024-09-05 PROCEDURE — 99214 OFFICE O/P EST MOD 30 MIN: CPT

## 2024-09-05 NOTE — ASSESSMENT
[FreeTextEntry1] : 73 year old M PMH CAD s/p stents, ELLIOTT on CPAP who presents for follow up of his sleep apnea.  1. Sleep apnea: The patient continues to experience sleep apnea symptoms, including snoring. He has not yet visited a dentist for a mandibular advancement device. He did well on his sleep study with a nasal mask but was not given a chin strap.   - Instruct the patient to use a chin strap to keep his mouth closed while using the CPAP mask. - Advise the patient to bring his CPAP machine to the next visit. - Schedule a follow-up call in two to three weeks to assess the effectiveness of the current treatment and to potentially prescribe a new mask with a chin strap. - If none of this works, then will need to undergo fitting for a mandibular advancement device.   The patient will follow up in two to three weeks to assess the effectiveness of the current treatment and to potentially prescribe a new mask with a chin strap.

## 2024-09-05 NOTE — HISTORY OF PRESENT ILLNESS
[Obstructive Sleep Apnea] : obstructive sleep apnea [Snoring] : snoring [Witnessed Apneas] : witnessed sleep apnea [Awakes Unrefreshed] : awakening unrefreshed [Awakes with Headache] : headache upon awakening [Awakening With Dry Mouth] : awakening with dry mouth [Daytime Somnolence] : daytime somnolence [% Days used: ____] : Days used: [unfilled] % [% Days used > 4 hrs: ____] : Days used > 4 hrs: [unfilled] % [Mean nightly usage: ___ hrs] : Mean nightly usage: [unfilled] hrs [___ min(s)] : [unfilled] min(s) [Therapy based AHI: ___ /hr] : Therapy based AHI: [unfilled] / hr [FreeTextEntry1] : 74 yo M PMH CAD s/p stents, ELLIOTT on CPAP who presents for follow up of his sleep apnea.  The patient reports that he felt the usual symptoms during his sleep study. He mentions that he is still experiencing issues with sleep, particularly at night, despite using melatonin pills occasionally. He works night shifts and sleeps from 8 or 9 AM until 1 or 2 PM, but he finds that he sleeps less at night, starting from 11 or 12 PM. His wife has also noted that he continues to snore. He has not yet visited a dentist for a device as previously discussed, as he was waiting to see the results of the sleep study.  Of note: Last sleep study in 4/2012 with AHI of 14.5. Reports followed with outside sleep physician and was prescribed PAP therapy for symptoms of snoring as well as daytime somnolence, headaches, awakening unrefreshed, and dry mouth. Has only been on CPAP for the past 2.5-3 years. Denies DIS/DMS, nocturnal awakenings. Denies significant improvement in symptoms w/ PAP therapy other than cessation of snoring. Last seen by sleep physician 2 years ago. 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   He brings in his previous sleep studies from [facility] back in 2017 which again shows mild obstructive sleep apnea.  Patient has been using his CPAP machine as suggested he should continue to do.  Indicates that he feels better but continues to snore through his mask.  Indicates that he recently changed his mask.  He also indicates that he feels tired during the day but admits only getting 6 and half hours of sleep.  Would like to know what he can do in order to increase his total sleep time.   [Frequent Nocturnal Awakening] : no nocturnal awakening [Recent  Weight Gain] : no recent weight gain [DIS] : no DIS [DMS] : no DMS [Lower Extremity Discomfort] : no lower extremity discomfort in evening or at bedtime [ESS] : 10

## 2024-09-09 ENCOUNTER — APPOINTMENT (OUTPATIENT)
Dept: ORTHOPEDIC SURGERY | Facility: CLINIC | Age: 74
End: 2024-09-09
Payer: COMMERCIAL

## 2024-09-09 DIAGNOSIS — M54.6 PAIN IN THORACIC SPINE: ICD-10-CM

## 2024-09-09 PROCEDURE — 99213 OFFICE O/P EST LOW 20 MIN: CPT

## 2024-09-09 NOTE — HISTORY OF PRESENT ILLNESS
[de-identified] : Ernesto is a 74yo Male pw lower back pain.  Pt felt a twisting injury during cleaning.  Pt notes extending sitting and ascending/descending stairs worsens the pain and symptoms.  Pt has tried activity modification and NSAIDs with minimal relief, pain level remains a 6/10.  Also notes increased stiffness/worse motion in the back.  Has not trialed physical therapy or injections.  Pt denies numbness, paresthesias, f/c.  No BBI or saddle anesthesia, no clumsiness or change in motor function.  No hx of spinal surgery. Pt notes the pain interferes with activities of daily life and that overall mobility has been decreased.  They wish to discuss possible treatment options and return to baseline activity and mobility before symptomatic onset.  Works at [employer].  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

## 2024-09-09 NOTE — PHYSICAL EXAM
[de-identified] : Gen: NAD Resp: Nonlabored respirations Gait: Nl Head: CN I - XII grossly intact  Spine: Skin in tact, tender paraspinal region Full neck ROM UE: 5/5 D B T WE WF IO b/l SILT C4-T1 b/l (-)Lola Kc No hand atrophy 2+ rad bcr  LE: 5/5 IP Q HS EHL TA GS SILT L3-S1 b/l (-) clonus, (-) babinski (-) slr, c/l slr 2+ dp pt wwp bcr  [de-identified] : The following radiographs were ordered and read by me during this patient's visit. I reviewed each radiograph in detail with the patient and discussed the findings as highlighted below.   4 views of the lumbar spine were obtained today that show no fracture, or dislocation. There are no degenerative changes seen. There is no malalignment or dynamic instability. Preserved lordosis.  No obvious osseous abnormality. Otherwise unremarkable.   MRI spine - mild multilevel DJD w foraminal stenosis

## 2024-09-09 NOTE — DISCUSSION/SUMMARY
[de-identified] : 73yM pw L back strain in setting of foraminal stenosis.  Pain is mostly muscular.  The patient was extensively counseled on treatment options including but not limited to observation, rest/activity modification, bracing, anti-inflammatory medications, physical therapy, injections, and surgery.  The natural history of the disease was thoroughly explained.  We discussed that the majority of the time, this condition can be initially treated conservatively. The patient will proceed with: -PT -tizanidine rx, continue meloxicam -pt was instructed on the importance of resting, icing and elevating to minimize swelling -RTC 6w - consider TP    I have personally obtained the history, reviewed the ROS as noted, and performed the physical examination today.  The patient and I discussed the assessment and options and developed the plan.  All questions were answered and the patient stated their understanding of the treatment plan and appreciation of the visit.   My cumulative time spent on this patient's visit included: Preparation for the visit, review of the medical records, review of pertinent diagnostic studies, examination and counseling of the patient on the above diagnosis, treatment plan and prognosis, orders of diagnostic tests, medications and/or appropriate procedures and documentation in the medical records of today's visit.   Ben Berumen MD

## 2024-09-11 ENCOUNTER — APPOINTMENT (OUTPATIENT)
Dept: INTERNAL MEDICINE | Facility: CLINIC | Age: 74
End: 2024-09-11

## 2024-09-11 ENCOUNTER — APPOINTMENT (OUTPATIENT)
Facility: CLINIC | Age: 74
End: 2024-09-11
Payer: COMMERCIAL

## 2024-09-11 VITALS
BODY MASS INDEX: 26.66 KG/M2 | OXYGEN SATURATION: 97 % | SYSTOLIC BLOOD PRESSURE: 128 MMHG | TEMPERATURE: 98.2 F | DIASTOLIC BLOOD PRESSURE: 60 MMHG | HEIGHT: 65 IN | WEIGHT: 160 LBS | HEART RATE: 79 BPM

## 2024-09-11 DIAGNOSIS — R30.0 DYSURIA: ICD-10-CM

## 2024-09-11 DIAGNOSIS — Z13.29 ENCOUNTER FOR SCREENING FOR OTHER SUSPECTED ENDOCRINE DISORDER: ICD-10-CM

## 2024-09-11 DIAGNOSIS — R73.03 PREDIABETES.: ICD-10-CM

## 2024-09-11 DIAGNOSIS — Z98.61 ATHEROSCLEROTIC HEART DISEASE OF NATIVE CORONARY ARTERY W/OUT ANGINA PECTORIS: ICD-10-CM

## 2024-09-11 DIAGNOSIS — E78.2 MIXED HYPERLIPIDEMIA: ICD-10-CM

## 2024-09-11 DIAGNOSIS — N40.0 BENIGN PROSTATIC HYPERPLASIA WITHOUT LOWER URINARY TRACT SYMPMS: ICD-10-CM

## 2024-09-11 DIAGNOSIS — M54.50 LOW BACK PAIN, UNSPECIFIED: ICD-10-CM

## 2024-09-11 DIAGNOSIS — I25.10 ATHEROSCLEROTIC HEART DISEASE OF NATIVE CORONARY ARTERY W/OUT ANGINA PECTORIS: ICD-10-CM

## 2024-09-11 DIAGNOSIS — L30.9 DERMATITIS, UNSPECIFIED: ICD-10-CM

## 2024-09-11 DIAGNOSIS — K76.0 FATTY (CHANGE OF) LIVER, NOT ELSEWHERE CLASSIFIED: ICD-10-CM

## 2024-09-11 DIAGNOSIS — N41.9 INFLAMMATORY DISEASE OF PROSTATE, UNSPECIFIED: ICD-10-CM

## 2024-09-11 DIAGNOSIS — Z00.00 ENCOUNTER FOR GENERAL ADULT MEDICAL EXAMINATION W/OUT ABNORMAL FINDINGS: ICD-10-CM

## 2024-09-11 PROCEDURE — 99203 OFFICE O/P NEW LOW 30 MIN: CPT

## 2024-09-11 RX ORDER — CLOTRIMAZOLE 10 MG/G
1 CREAM TOPICAL
Qty: 1 | Refills: 0 | Status: ACTIVE | COMMUNITY
Start: 2024-09-11 | End: 1900-01-01

## 2024-09-11 RX ORDER — CIPROFLOXACIN HYDROCHLORIDE 500 MG/1
500 TABLET, FILM COATED ORAL
Qty: 28 | Refills: 0 | Status: ACTIVE | COMMUNITY
Start: 2024-09-11 | End: 1900-01-01

## 2024-09-11 NOTE — PHYSICAL EXAM
[Normal] : normal rate, regular rhythm, normal S1 and S2 and no murmur heard [Penis Abnormality] : normal uncircumcised penis [Scrotum] : the scrotum was normal [Epididymis] : the epididymides were normal [Testes Tenderness] : no tenderness of the testes [Testes Mass (___cm)] : there were no testicular masses [FreeTextEntry1] : dry skin on scrotum

## 2024-09-11 NOTE — HISTORY OF PRESENT ILLNESS
[de-identified] : left pain  [FreeTextEntry1] : 3-4 months  [FreeTextEntry8] : 73 yr old M with hx of low back pain and prostate surgery for BPH with 3-4 months of low back pain and urinary issues.   Has had history of problems with urination and ejaculation since the surgery but this feels different, Has prostate pain with urination, intermittent flow, some urinary incontinence.  No blood in urine, notes urine dark/bright yellow. has not seen his urologist Dr Ayers about this.   No fever, chills , abdominal pain or vomiting.   Also reports some scrotal itching.    Requesting CPE labs to be ordered in advance of appt with Dr Merrill at end of month, traveling for 2 weeks prior so would like them ordered today, he is not fasting   Patient was interviewed and examined with chaperone present. Name of Chaperone: Mario Alberto COOK

## 2024-09-11 NOTE — REVIEW OF SYSTEMS
[Dysuria] : dysuria [Incontinence] : incontinence [Frequency] : frequency [Negative] : Genitourinary

## 2024-09-12 ENCOUNTER — APPOINTMENT (OUTPATIENT)
Dept: INTERNAL MEDICINE | Facility: CLINIC | Age: 74
End: 2024-09-12
Payer: MEDICARE

## 2024-09-12 LAB
BASOPHILS # BLD AUTO: 0.05 K/UL
BASOPHILS NFR BLD AUTO: 0.8 %
EOSINOPHIL # BLD AUTO: 0.35 K/UL
EOSINOPHIL NFR BLD AUTO: 5.4 %
HCT VFR BLD CALC: 44.4 %
HGB BLD-MCNC: 15.1 G/DL
IMM GRANULOCYTES NFR BLD AUTO: 0.2 %
LYMPHOCYTES # BLD AUTO: 1.86 K/UL
LYMPHOCYTES NFR BLD AUTO: 28.6 %
MAN DIFF?: NORMAL
MCHC RBC-ENTMCNC: 30.6 PG
MCHC RBC-ENTMCNC: 34 GM/DL
MCV RBC AUTO: 90.1 FL
MONOCYTES # BLD AUTO: 0.65 K/UL
MONOCYTES NFR BLD AUTO: 10 %
NEUTROPHILS # BLD AUTO: 3.59 K/UL
NEUTROPHILS NFR BLD AUTO: 55 %
PLATELET # BLD AUTO: 195 K/UL
RBC # BLD: 4.93 M/UL
RBC # FLD: 12.3 %
WBC # FLD AUTO: 6.51 K/UL

## 2024-09-12 PROCEDURE — 36415 COLL VENOUS BLD VENIPUNCTURE: CPT

## 2024-09-13 LAB
ALBUMIN SERPL ELPH-MCNC: 4.3 G/DL
ALP BLD-CCNC: 80 U/L
ALT SERPL-CCNC: 68 U/L
ANION GAP SERPL CALC-SCNC: 13 MMOL/L
APPEARANCE: CLEAR
AST SERPL-CCNC: 34 U/L
BILIRUB SERPL-MCNC: 0.6 MG/DL
BILIRUBIN URINE: NEGATIVE
BLOOD URINE: NEGATIVE
BUN SERPL-MCNC: 22 MG/DL
CALCIUM SERPL-MCNC: 9 MG/DL
CHLORIDE SERPL-SCNC: 104 MMOL/L
CHOLEST SERPL-MCNC: 79 MG/DL
CO2 SERPL-SCNC: 22 MMOL/L
COLOR: YELLOW
CREAT SERPL-MCNC: 1.21 MG/DL
EGFR: 63 ML/MIN/1.73M2
GLUCOSE QUALITATIVE U: NEGATIVE MG/DL
GLUCOSE SERPL-MCNC: 86 MG/DL
HDLC SERPL-MCNC: 25 MG/DL
KETONES URINE: NEGATIVE MG/DL
LDLC SERPL CALC-MCNC: 36 MG/DL
LEUKOCYTE ESTERASE URINE: NEGATIVE
NITRITE URINE: NEGATIVE
NONHDLC SERPL-MCNC: 54 MG/DL
PH URINE: 6
POTASSIUM SERPL-SCNC: 4.4 MMOL/L
PROT SERPL-MCNC: 6.8 G/DL
PROTEIN URINE: NEGATIVE MG/DL
PSA SERPL-MCNC: 2.19 NG/ML
SODIUM SERPL-SCNC: 139 MMOL/L
SPECIFIC GRAVITY URINE: 1.02
TRIGL SERPL-MCNC: 90 MG/DL
TSH SERPL-ACNC: 2.43 UIU/ML
UROBILINOGEN URINE: 0.2 MG/DL

## 2024-09-17 LAB
C TRACH RRNA SPEC QL NAA+PROBE: NOT DETECTED
N GONORRHOEA RRNA SPEC QL NAA+PROBE: NOT DETECTED
SOURCE AMPLIFICATION: NORMAL
T VAGINALIS RRNA SPEC QL NAA+PROBE: NOT DETECTED

## 2024-09-26 ENCOUNTER — NON-APPOINTMENT (OUTPATIENT)
Age: 74
End: 2024-09-26

## 2024-09-26 ENCOUNTER — APPOINTMENT (OUTPATIENT)
Dept: UROLOGY | Facility: CLINIC | Age: 74
End: 2024-09-26
Payer: MEDICARE

## 2024-09-26 VITALS — OXYGEN SATURATION: 96 % | SYSTOLIC BLOOD PRESSURE: 124 MMHG | HEART RATE: 65 BPM | DIASTOLIC BLOOD PRESSURE: 72 MMHG

## 2024-09-26 DIAGNOSIS — N39.43 BENIGN PROSTATIC HYPERPLASIA WITH LOWER URINARY TRACT SYMPMS: ICD-10-CM

## 2024-09-26 DIAGNOSIS — Z12.5 ENCOUNTER FOR SCREENING FOR MALIGNANT NEOPLASM OF PROSTATE: ICD-10-CM

## 2024-09-26 DIAGNOSIS — N40.1 BENIGN PROSTATIC HYPERPLASIA WITH LOWER URINARY TRACT SYMPMS: ICD-10-CM

## 2024-09-26 PROCEDURE — 99213 OFFICE O/P EST LOW 20 MIN: CPT

## 2024-09-26 NOTE — ASSESSMENT
[FreeTextEntry1] : 74 yo M with scrotal skin burning occasionally and BPH And post void dribbling  #Post void dribbling  - Emptying well - Not on tamsulosin - RPA as needed  #Scrotal skin burning - No obvious skin lesions noted  #PSA Screening - PSA 2.17 ng/mL, wnl

## 2024-09-26 NOTE — PHYSICAL EXAM
[Normal Appearance] : normal appearance [Well Groomed] : well groomed [General Appearance - In No Acute Distress] : no acute distress [Edema] : no peripheral edema [Respiration, Rhythm And Depth] : normal respiratory rhythm and effort [Exaggerated Use Of Accessory Muscles For Inspiration] : no accessory muscle use [Abdomen Soft] : soft [Abdomen Tenderness] : non-tender [Urinary Bladder Findings] : the bladder was normal on palpation [Normal Station and Gait] : the gait and station were normal for the patient's age [] : no rash [No Focal Deficits] : no focal deficits [Oriented To Time, Place, And Person] : oriented to person, place, and time [Affect] : the affect was normal [Mood] : the mood was normal [de-identified] : scrotum without obvious rash

## 2024-09-26 NOTE — HISTORY OF PRESENT ILLNESS
[FreeTextEntry1] : : Oct 21 1950  Referring Provider: none   HPI: Mr. PJ ANDRADE is a 71 year yo M with a PMHx notable for BPH, s/p prior ablation 6 years ago and a Rezum 2 years ago. At that time he was having urinary frequency and nocturia and was on Flomax. Procedure was done at [facility]. He now presents to establish care and follow up of his prostate - had insurance issues with previous provider so presents today to establish care. He has no urinary complaints, is continent, no frequency, urgency, nocturia, or feelings of incomplete emptying.   He also asks abut his reproductive function should he choose to have another child, son  at young age. No ED but mentions that he does not produce much ejaculate (likely 2/2 prostate procedures). He mentions some occasional, slight feeling of pain/irritation in his bladder that he rates 1/10. PVR 3ml.   Anticoagulation: None All: NKDA Social: . Retired . Never smoker. No EtOH. Had 1 son who passed away age 41.  PMHx: No medications.  FHx: No prostate cancer. Kidney cancer in older sister 12 years ago PSHx: Prostate "ablation" 6 years ago and Rezum 2 years ago  24 Pt presents for f/u - hx of CaP- last PSA 2.17 ()- avg risk. Pt reports having some dysuria and feeling of incomplete emptying with some post-void leaking. PVR = 0cc. Pt stopped Flomax- felt it was draining him- unsure if related to feeling low blood pressure- discussed possible side effects and can discontinue if needed as pt is emptying well. Pt to have UA and UCx sent out for dysuria.   : Has some dribbling post void, advised to work on full relaxation. No more dysuria. UA in  and 2024 was all negative for blood in urine or obvious infection. Also complaining of burning on his scrotum. PSA 2.17 2024 done by Dr. Andersen. Is not currently taking the tamsulosin.  [Urinary Incontinence] : no urinary incontinence [Urinary Retention] : no urinary retention [Urinary Urgency] : no urinary urgency [Urinary Frequency] : no urinary frequency [Dysuria] : dysuria

## 2024-10-01 ENCOUNTER — NON-APPOINTMENT (OUTPATIENT)
Age: 74
End: 2024-10-01

## 2024-10-03 ENCOUNTER — NON-APPOINTMENT (OUTPATIENT)
Age: 74
End: 2024-10-03

## 2024-10-03 ENCOUNTER — APPOINTMENT (OUTPATIENT)
Age: 74
End: 2024-10-03
Payer: COMMERCIAL

## 2024-10-03 PROCEDURE — 92012 INTRM OPH EXAM EST PATIENT: CPT

## 2024-10-03 PROCEDURE — 92002 INTRM OPH EXAM NEW PATIENT: CPT

## 2024-10-16 ENCOUNTER — APPOINTMENT (OUTPATIENT)
Dept: CARE COORDINATION | Facility: HOME HEALTH | Age: 74
End: 2024-10-16

## 2024-10-29 ENCOUNTER — APPOINTMENT (OUTPATIENT)
Dept: INTERNAL MEDICINE | Facility: CLINIC | Age: 74
End: 2024-10-29
Payer: COMMERCIAL

## 2024-10-29 ENCOUNTER — MED ADMIN CHARGE (OUTPATIENT)
Age: 74
End: 2024-10-29

## 2024-10-29 PROCEDURE — G0008: CPT

## 2024-10-29 PROCEDURE — 90662 IIV NO PRSV INCREASED AG IM: CPT

## 2024-11-01 ENCOUNTER — APPOINTMENT (OUTPATIENT)
Facility: CLINIC | Age: 74
End: 2024-11-01
Payer: COMMERCIAL

## 2024-11-01 VITALS
SYSTOLIC BLOOD PRESSURE: 118 MMHG | HEART RATE: 77 BPM | DIASTOLIC BLOOD PRESSURE: 68 MMHG | WEIGHT: 163 LBS | OXYGEN SATURATION: 97 % | BODY MASS INDEX: 27.16 KG/M2 | HEIGHT: 65 IN | TEMPERATURE: 98.1 F

## 2024-11-01 DIAGNOSIS — N40.1 BENIGN PROSTATIC HYPERPLASIA WITH LOWER URINARY TRACT SYMPMS: ICD-10-CM

## 2024-11-01 DIAGNOSIS — G47.33 OBSTRUCTIVE SLEEP APNEA (ADULT) (PEDIATRIC): ICD-10-CM

## 2024-11-01 DIAGNOSIS — H61.20 IMPACTED CERUMEN, UNSPECIFIED EAR: ICD-10-CM

## 2024-11-01 DIAGNOSIS — H53.9 UNSPECIFIED VISUAL DISTURBANCE: ICD-10-CM

## 2024-11-01 DIAGNOSIS — Z23 ENCOUNTER FOR IMMUNIZATION: ICD-10-CM

## 2024-11-01 DIAGNOSIS — Z00.00 ENCOUNTER FOR GENERAL ADULT MEDICAL EXAMINATION W/OUT ABNORMAL FINDINGS: ICD-10-CM

## 2024-11-01 DIAGNOSIS — N39.43 BENIGN PROSTATIC HYPERPLASIA WITH LOWER URINARY TRACT SYMPMS: ICD-10-CM

## 2024-11-01 DIAGNOSIS — Z12.11 ENCOUNTER FOR SCREENING FOR MALIGNANT NEOPLASM OF COLON: ICD-10-CM

## 2024-11-01 DIAGNOSIS — L30.9 DERMATITIS, UNSPECIFIED: ICD-10-CM

## 2024-11-01 DIAGNOSIS — Z98.61 ATHEROSCLEROTIC HEART DISEASE OF NATIVE CORONARY ARTERY W/OUT ANGINA PECTORIS: ICD-10-CM

## 2024-11-01 DIAGNOSIS — M21.611 BUNION OF RIGHT FOOT: ICD-10-CM

## 2024-11-01 DIAGNOSIS — I25.10 ATHEROSCLEROTIC HEART DISEASE OF NATIVE CORONARY ARTERY W/OUT ANGINA PECTORIS: ICD-10-CM

## 2024-11-01 PROCEDURE — 99397 PER PM REEVAL EST PAT 65+ YR: CPT

## 2024-11-01 PROCEDURE — G0009: CPT

## 2024-11-01 PROCEDURE — 90677 PCV20 VACCINE IM: CPT

## 2024-11-01 RX ORDER — LORATADINE 5 MG
17 TABLET,CHEWABLE ORAL DAILY
Refills: 0 | Status: ACTIVE | COMMUNITY
Start: 2024-11-01

## 2024-11-20 ENCOUNTER — APPOINTMENT (OUTPATIENT)
Dept: INTERNAL MEDICINE | Facility: CLINIC | Age: 74
End: 2024-11-20

## 2024-11-30 NOTE — ED ADULT NURSE NOTE - OBJECTIVE STATEMENT
No
66 y.o. male presents ambulatory to ED c/o lower abd pain & urinary retention. Hx of BPH with prostate ablation last week, indwelling urinary catheter removal this morning. States he was able to void three times after catheter was removed however has not been able to since this morning. On phenazopyridine, cipro & acetaminophen/codeine 300/30.

## 2025-01-14 ENCOUNTER — NON-APPOINTMENT (OUTPATIENT)
Age: 75
End: 2025-01-14

## 2025-01-14 ENCOUNTER — APPOINTMENT (OUTPATIENT)
Dept: CARDIOLOGY | Facility: CLINIC | Age: 75
End: 2025-01-14
Payer: MEDICARE

## 2025-01-14 VITALS
OXYGEN SATURATION: 96 % | WEIGHT: 163 LBS | BODY MASS INDEX: 27.12 KG/M2 | DIASTOLIC BLOOD PRESSURE: 67 MMHG | SYSTOLIC BLOOD PRESSURE: 115 MMHG | HEART RATE: 70 BPM

## 2025-01-14 DIAGNOSIS — I25.10 ATHEROSCLEROTIC HEART DISEASE OF NATIVE CORONARY ARTERY W/OUT ANGINA PECTORIS: ICD-10-CM

## 2025-01-14 DIAGNOSIS — E78.2 MIXED HYPERLIPIDEMIA: ICD-10-CM

## 2025-01-14 DIAGNOSIS — Z98.61 ATHEROSCLEROTIC HEART DISEASE OF NATIVE CORONARY ARTERY W/OUT ANGINA PECTORIS: ICD-10-CM

## 2025-01-14 PROCEDURE — 99214 OFFICE O/P EST MOD 30 MIN: CPT

## 2025-01-14 PROCEDURE — 93000 ELECTROCARDIOGRAM COMPLETE: CPT

## 2025-04-29 ENCOUNTER — NON-APPOINTMENT (OUTPATIENT)
Age: 75
End: 2025-04-29

## 2025-04-29 ENCOUNTER — APPOINTMENT (OUTPATIENT)
Dept: UROLOGY | Facility: CLINIC | Age: 75
End: 2025-04-29
Payer: MEDICARE

## 2025-04-29 DIAGNOSIS — N40.0 BENIGN PROSTATIC HYPERPLASIA WITHOUT LOWER URINARY TRACT SYMPMS: ICD-10-CM

## 2025-04-29 PROCEDURE — 99213 OFFICE O/P EST LOW 20 MIN: CPT

## 2025-04-29 PROCEDURE — 51798 US URINE CAPACITY MEASURE: CPT

## 2025-04-29 PROCEDURE — G2211 COMPLEX E/M VISIT ADD ON: CPT

## 2025-04-29 PROCEDURE — 51741 ELECTRO-UROFLOWMETRY FIRST: CPT

## 2025-05-01 LAB
APPEARANCE: CLEAR
BACTERIA UR CULT: NORMAL
BACTERIA: NEGATIVE /HPF
BILIRUBIN URINE: NEGATIVE
BLOOD URINE: NEGATIVE
CAST: 0 /LPF
COLOR: YELLOW
EPITHELIAL CELLS: 0 /HPF
GLUCOSE QUALITATIVE U: NEGATIVE MG/DL
KETONES URINE: NEGATIVE MG/DL
LEUKOCYTE ESTERASE URINE: NEGATIVE
MICROSCOPIC-UA: NORMAL
NITRITE URINE: NEGATIVE
PH URINE: 6.5
PROTEIN URINE: NEGATIVE MG/DL
PSA FREE FLD-MCNC: 28 %
PSA FREE SERPL-MCNC: 0.77 NG/ML
PSA SERPL-MCNC: 2.72 NG/ML
RED BLOOD CELLS URINE: 1 /HPF
SPECIFIC GRAVITY URINE: 1.02
UROBILINOGEN URINE: 0.2 MG/DL
WHITE BLOOD CELLS URINE: 0 /HPF

## 2025-06-17 ENCOUNTER — APPOINTMENT (OUTPATIENT)
Dept: ULTRASOUND IMAGING | Facility: IMAGING CENTER | Age: 75
End: 2025-06-17

## 2025-07-04 ENCOUNTER — NON-APPOINTMENT (OUTPATIENT)
Age: 75
End: 2025-07-04

## 2025-07-09 ENCOUNTER — NON-APPOINTMENT (OUTPATIENT)
Age: 75
End: 2025-07-09

## 2025-07-09 ENCOUNTER — APPOINTMENT (OUTPATIENT)
Dept: OPHTHALMOLOGY | Facility: CLINIC | Age: 75
End: 2025-07-09
Payer: MEDICARE

## 2025-07-09 PROCEDURE — 92133 CPTRZD OPH DX IMG PST SGM ON: CPT

## 2025-07-09 PROCEDURE — 92014 COMPRE OPH EXAM EST PT 1/>: CPT

## 2025-07-15 ENCOUNTER — APPOINTMENT (OUTPATIENT)
Dept: CARDIOLOGY | Facility: CLINIC | Age: 75
End: 2025-07-15

## 2025-07-16 ENCOUNTER — APPOINTMENT (OUTPATIENT)
Dept: INTERNAL MEDICINE | Facility: CLINIC | Age: 75
End: 2025-07-16

## 2025-07-18 ENCOUNTER — APPOINTMENT (OUTPATIENT)
Facility: CLINIC | Age: 75
End: 2025-07-18

## 2025-07-18 VITALS
RESPIRATION RATE: 16 BRPM | HEIGHT: 65 IN | HEART RATE: 78 BPM | OXYGEN SATURATION: 95 % | DIASTOLIC BLOOD PRESSURE: 60 MMHG | BODY MASS INDEX: 27.35 KG/M2 | SYSTOLIC BLOOD PRESSURE: 110 MMHG | WEIGHT: 164.13 LBS | TEMPERATURE: 98.6 F

## 2025-07-18 PROCEDURE — 99204 OFFICE O/P NEW MOD 45 MIN: CPT

## 2025-07-25 ENCOUNTER — APPOINTMENT (OUTPATIENT)
Dept: RHEUMATOLOGY | Facility: CLINIC | Age: 75
End: 2025-07-25
Payer: COMMERCIAL

## 2025-07-25 VITALS
SYSTOLIC BLOOD PRESSURE: 118 MMHG | OXYGEN SATURATION: 96 % | WEIGHT: 162 LBS | BODY MASS INDEX: 26.99 KG/M2 | HEART RATE: 69 BPM | DIASTOLIC BLOOD PRESSURE: 80 MMHG | HEIGHT: 65 IN | TEMPERATURE: 97.3 F | RESPIRATION RATE: 16 BRPM

## 2025-07-25 DIAGNOSIS — M79.641 PAIN IN RIGHT HAND: ICD-10-CM

## 2025-07-25 DIAGNOSIS — M25.532 PAIN IN RIGHT WRIST: ICD-10-CM

## 2025-07-25 DIAGNOSIS — M79.642 PAIN IN RIGHT HAND: ICD-10-CM

## 2025-07-25 DIAGNOSIS — M25.531 PAIN IN RIGHT WRIST: ICD-10-CM

## 2025-07-25 LAB
25(OH)D3 SERPL-MCNC: 45.7 NG/ML
ANA TITR SER: ABNORMAL
ANA TITR SER: ABNORMAL
ANION GAP SERPL CALC-SCNC: 14 MMOL/L
BUN SERPL-MCNC: 20 MG/DL
CALCIUM SERPL-MCNC: 9.6 MG/DL
CHLORIDE SERPL-SCNC: 109 MMOL/L
CO2 SERPL-SCNC: 22 MMOL/L
CREAT SERPL-MCNC: 1.03 MG/DL
CRP SERPL-MCNC: <3 MG/L
EGFRCR SERPLBLD CKD-EPI 2021: 76 ML/MIN/1.73M2
ERYTHROCYTE [SEDIMENTATION RATE] IN BLOOD BY WESTERGREN METHOD: 11 MM/HR
GLUCOSE SERPL-MCNC: 127 MG/DL
POTASSIUM SERPL-SCNC: 4.1 MMOL/L
SODIUM SERPL-SCNC: 144 MMOL/L
TSH SERPL-ACNC: 1.87 UIU/ML
VIT B12 SERPL-MCNC: 1362 PG/ML

## 2025-07-25 PROCEDURE — 99204 OFFICE O/P NEW MOD 45 MIN: CPT

## 2025-07-25 PROCEDURE — G2211 COMPLEX E/M VISIT ADD ON: CPT | Mod: NC

## 2025-08-12 ENCOUNTER — OUTPATIENT (OUTPATIENT)
Dept: OUTPATIENT SERVICES | Facility: HOSPITAL | Age: 75
LOS: 1 days | End: 2025-08-12
Payer: COMMERCIAL

## 2025-08-12 ENCOUNTER — APPOINTMENT (OUTPATIENT)
Dept: RADIOLOGY | Facility: CLINIC | Age: 75
End: 2025-08-12
Payer: COMMERCIAL

## 2025-08-12 DIAGNOSIS — S82.91XA UNSPECIFIED FRACTURE OF RIGHT LOWER LEG, INITIAL ENCOUNTER FOR CLOSED FRACTURE: Chronic | ICD-10-CM

## 2025-08-12 DIAGNOSIS — M25.531 PAIN IN RIGHT WRIST: ICD-10-CM

## 2025-08-12 LAB
CCP AB SER IA-ACNC: <8 U/ML
CCP AB SER QL: NEGATIVE
ESTIMATED AVERAGE GLUCOSE: 120 MG/DL
FOLATE SERPL-MCNC: >20 NG/ML
HBA1C MFR BLD HPLC: 5.8 %
RHEUMATOID FACT SERPL-ACNC: <10 IU/ML
VIT B12 SERPL-MCNC: 1336 PG/ML

## 2025-08-12 PROCEDURE — 73130 X-RAY EXAM OF HAND: CPT

## 2025-08-12 PROCEDURE — 73130 X-RAY EXAM OF HAND: CPT | Mod: 26,50

## 2025-08-12 PROCEDURE — 73110 X-RAY EXAM OF WRIST: CPT | Mod: 26,50

## 2025-08-12 PROCEDURE — 73110 X-RAY EXAM OF WRIST: CPT

## 2025-08-15 ENCOUNTER — APPOINTMENT (OUTPATIENT)
Dept: RHEUMATOLOGY | Facility: CLINIC | Age: 75
End: 2025-08-15
Payer: COMMERCIAL

## 2025-08-15 VITALS
OXYGEN SATURATION: 97 % | WEIGHT: 160 LBS | HEIGHT: 65 IN | HEART RATE: 72 BPM | BODY MASS INDEX: 26.66 KG/M2 | TEMPERATURE: 97.6 F | RESPIRATION RATE: 16 BRPM | DIASTOLIC BLOOD PRESSURE: 72 MMHG | SYSTOLIC BLOOD PRESSURE: 120 MMHG

## 2025-08-15 DIAGNOSIS — M19.041 PRIMARY OSTEOARTHRITIS, RIGHT HAND: ICD-10-CM

## 2025-08-15 DIAGNOSIS — M19.042 PRIMARY OSTEOARTHRITIS, RIGHT HAND: ICD-10-CM

## 2025-08-15 PROCEDURE — 99214 OFFICE O/P EST MOD 30 MIN: CPT

## 2025-08-21 ENCOUNTER — APPOINTMENT (OUTPATIENT)
Dept: ORTHOPEDIC SURGERY | Facility: CLINIC | Age: 75
End: 2025-08-21

## 2025-08-21 VITALS — BODY MASS INDEX: 26.66 KG/M2 | WEIGHT: 160 LBS | HEIGHT: 65 IN

## 2025-08-21 DIAGNOSIS — M65.4 RADIAL STYLOID TENOSYNOVITIS [DE QUERVAIN]: ICD-10-CM

## 2025-08-26 ENCOUNTER — NON-APPOINTMENT (OUTPATIENT)
Age: 75
End: 2025-08-26

## 2025-08-29 ENCOUNTER — NON-APPOINTMENT (OUTPATIENT)
Age: 75
End: 2025-08-29

## 2025-09-09 ENCOUNTER — APPOINTMENT (OUTPATIENT)
Dept: UROLOGY | Facility: CLINIC | Age: 75
End: 2025-09-09

## 2025-09-16 ENCOUNTER — APPOINTMENT (OUTPATIENT)
Dept: CARDIOLOGY | Facility: CLINIC | Age: 75
End: 2025-09-16